# Patient Record
Sex: MALE | Race: WHITE | NOT HISPANIC OR LATINO | Employment: FULL TIME | URBAN - NONMETROPOLITAN AREA
[De-identification: names, ages, dates, MRNs, and addresses within clinical notes are randomized per-mention and may not be internally consistent; named-entity substitution may affect disease eponyms.]

---

## 2017-02-06 ENCOUNTER — AMBULATORY - GICH (OUTPATIENT)
Dept: ORTHOPEDICS | Facility: OTHER | Age: 52
End: 2017-02-06

## 2017-02-06 DIAGNOSIS — Z98.890 OTHER SPECIFIED POSTPROCEDURAL STATES: ICD-10-CM

## 2017-02-07 ENCOUNTER — HISTORY (OUTPATIENT)
Dept: ORTHOPEDICS | Facility: OTHER | Age: 52
End: 2017-02-07

## 2017-02-07 ENCOUNTER — OFFICE VISIT - GICH (OUTPATIENT)
Dept: ORTHOPEDICS | Facility: OTHER | Age: 52
End: 2017-02-07

## 2017-02-07 ENCOUNTER — HOSPITAL ENCOUNTER (OUTPATIENT)
Dept: RADIOLOGY | Facility: OTHER | Age: 52
End: 2017-02-07
Attending: ORTHOPAEDIC SURGERY

## 2017-02-07 DIAGNOSIS — Z98.890 OTHER SPECIFIED POSTPROCEDURAL STATES: ICD-10-CM

## 2017-02-08 ENCOUNTER — COMMUNICATION - GICH (OUTPATIENT)
Dept: INTERNAL MEDICINE | Facility: OTHER | Age: 52
End: 2017-02-08

## 2017-02-08 DIAGNOSIS — I10 ESSENTIAL (PRIMARY) HYPERTENSION: ICD-10-CM

## 2017-03-26 ENCOUNTER — HISTORY (OUTPATIENT)
Dept: FAMILY MEDICINE | Facility: OTHER | Age: 52
End: 2017-03-26

## 2017-03-26 ENCOUNTER — OFFICE VISIT - GICH (OUTPATIENT)
Dept: FAMILY MEDICINE | Facility: OTHER | Age: 52
End: 2017-03-26

## 2017-03-26 DIAGNOSIS — R69 ILLNESS: ICD-10-CM

## 2017-03-26 DIAGNOSIS — R05.9 COUGH: ICD-10-CM

## 2017-03-26 DIAGNOSIS — R53.83 OTHER FATIGUE: ICD-10-CM

## 2017-03-26 DIAGNOSIS — R50.9 FEVER: ICD-10-CM

## 2017-04-05 ENCOUNTER — COMMUNICATION - GICH (OUTPATIENT)
Dept: INTERNAL MEDICINE | Facility: OTHER | Age: 52
End: 2017-04-05

## 2017-04-05 DIAGNOSIS — E11.65 TYPE 2 DIABETES MELLITUS WITH HYPERGLYCEMIA (H): ICD-10-CM

## 2017-04-06 ENCOUNTER — COMMUNICATION - GICH (OUTPATIENT)
Dept: INTERNAL MEDICINE | Facility: OTHER | Age: 52
End: 2017-04-06

## 2017-04-06 DIAGNOSIS — I10 ESSENTIAL (PRIMARY) HYPERTENSION: ICD-10-CM

## 2017-04-28 ENCOUNTER — COMMUNICATION - GICH (OUTPATIENT)
Dept: INTERNAL MEDICINE | Facility: OTHER | Age: 52
End: 2017-04-28

## 2017-04-28 DIAGNOSIS — E11.9 TYPE 2 DIABETES MELLITUS WITHOUT COMPLICATIONS (H): ICD-10-CM

## 2017-05-08 ENCOUNTER — OFFICE VISIT - GICH (OUTPATIENT)
Dept: INTERNAL MEDICINE | Facility: OTHER | Age: 52
End: 2017-05-08

## 2017-05-08 ENCOUNTER — HISTORY (OUTPATIENT)
Dept: INTERNAL MEDICINE | Facility: OTHER | Age: 52
End: 2017-05-08

## 2017-05-08 DIAGNOSIS — R94.5 ABNORMAL RESULTS OF LIVER FUNCTION STUDIES: ICD-10-CM

## 2017-05-08 DIAGNOSIS — I10 ESSENTIAL (PRIMARY) HYPERTENSION: ICD-10-CM

## 2017-05-08 DIAGNOSIS — E66.01 MORBID (SEVERE) OBESITY DUE TO EXCESS CALORIES (H): ICD-10-CM

## 2017-05-08 DIAGNOSIS — G47.33 OBSTRUCTIVE SLEEP APNEA: ICD-10-CM

## 2017-05-08 DIAGNOSIS — E11.9 TYPE 2 DIABETES MELLITUS WITHOUT COMPLICATIONS (H): ICD-10-CM

## 2017-05-08 DIAGNOSIS — Z00.00 ENCOUNTER FOR GENERAL ADULT MEDICAL EXAMINATION WITHOUT ABNORMAL FINDINGS: ICD-10-CM

## 2017-05-08 DIAGNOSIS — E78.2 MIXED HYPERLIPIDEMIA: ICD-10-CM

## 2017-05-08 DIAGNOSIS — Z99.89 DEPENDENCE ON OTHER ENABLING MACHINES AND DEVICES: ICD-10-CM

## 2017-05-08 LAB
A/G RATIO - HISTORICAL: 1.6 (ref 1–2)
ALBUMIN SERPL-MCNC: 4.7 G/DL (ref 3.5–5.7)
ALP SERPL-CCNC: 45 IU/L (ref 34–104)
ALT (SGPT) - HISTORICAL: 33 IU/L (ref 7–52)
ANION GAP - HISTORICAL: 13 (ref 5–18)
AST SERPL-CCNC: 27 IU/L (ref 13–39)
BILIRUB SERPL-MCNC: 0.4 MG/DL (ref 0.3–1)
BILIRUB UR QL: NEGATIVE
BUN SERPL-MCNC: 14 MG/DL (ref 7–25)
BUN/CREAT RATIO - HISTORICAL: 14
CALCIUM SERPL-MCNC: 9.7 MG/DL (ref 8.6–10.3)
CHLORIDE SERPLBLD-SCNC: 101 MMOL/L (ref 98–107)
CHOL/HDL RATIO - HISTORICAL: 2.82
CHOLESTEROL TOTAL: 127 MG/DL
CLARITY, URINE: CLEAR CLARITY
CO2 SERPL-SCNC: 25 MMOL/L (ref 21–31)
COLOR UR: YELLOW COLOR
CREAT SERPL-MCNC: 0.97 MG/DL (ref 0.7–1.3)
ERYTHROCYTE [DISTWIDTH] IN BLOOD BY AUTOMATED COUNT: 11.6 % (ref 11.5–15.5)
ESTIMATED AVERAGE GLUCOSE: 128 MG/DL
GFR IF NOT AFRICAN AMERICAN - HISTORICAL: >60 ML/MIN/1.73M2
GLOBULIN - HISTORICAL: 2.9 G/DL (ref 2–3.7)
GLUCOSE SERPL-MCNC: 98 MG/DL (ref 70–105)
GLUCOSE URINE: NEGATIVE MG/DL
HCT VFR BLD AUTO: 44.8 % (ref 37–53)
HDLC SERPL-MCNC: 45 MG/DL (ref 23–92)
HEMOGLOBIN A1C MONITORING (POCT) - HISTORICAL: 6.1 % (ref 4–6.2)
HEMOGLOBIN: 14.9 G/DL (ref 13.5–17.5)
KETONES UR QL: NEGATIVE MG/DL
LDLC SERPL CALC-MCNC: 57 MG/DL
LEUKOCYTE ESTERASE URINE: NEGATIVE
MCH RBC QN AUTO: 31 PG (ref 26–34)
MCHC RBC AUTO-ENTMCNC: 33.2 G/DL (ref 32–36)
MCV RBC AUTO: 93 FL (ref 80–100)
NITRITE UR QL STRIP: NEGATIVE
NON-HDL CHOLESTEROL - HISTORICAL: 82 MG/DL
OCCULT BLOOD,URINE - HISTORICAL: NEGATIVE
PATIENT STATUS - HISTORICAL: NORMAL
PH UR: 5 [PH]
PLATELET # BLD AUTO: 287 THOU/CU MM (ref 140–440)
PMV BLD: 7.4 FL (ref 6.5–11)
POTASSIUM SERPL-SCNC: 4.2 MMOL/L (ref 3.5–5.1)
PROT SERPL-MCNC: 7.6 G/DL (ref 6.4–8.9)
PROTEIN QUALITATIVE,URINE - HISTORICAL: NEGATIVE MG/DL
RED BLOOD COUNT - HISTORICAL: 4.8 MIL/CU MM (ref 4.3–5.9)
SODIUM SERPL-SCNC: 139 MMOL/L (ref 133–143)
SP GR UR STRIP: 1.01
TRIGL SERPL-MCNC: 127 MG/DL
UROBILINOGEN,QUALITATIVE - HISTORICAL: NORMAL EU/DL
WHITE BLOOD COUNT - HISTORICAL: 8.3 THOU/CU MM (ref 4.5–11)

## 2017-05-08 ASSESSMENT — PATIENT HEALTH QUESTIONNAIRE - PHQ9: SUM OF ALL RESPONSES TO PHQ QUESTIONS 1-9: 0

## 2017-05-09 ENCOUNTER — COMMUNICATION - GICH (OUTPATIENT)
Dept: SURGERY | Facility: OTHER | Age: 52
End: 2017-05-09

## 2017-05-09 DIAGNOSIS — Z12.11 ENCOUNTER FOR SCREENING FOR MALIGNANT NEOPLASM OF COLON: ICD-10-CM

## 2017-05-10 LAB
ALB RAND URINE - HISTORICAL: <5 MG/L
CREATININE, URINE - HISTORICAL: 0.14 G/L
MICROALBUMIN, RAND UR - HISTORICAL: NORMAL MG/G CREAT
PROTEIN QUALITATIVE,URINE - HISTORICAL: NEGATIVE MG/DL

## 2017-05-18 ENCOUNTER — SURGERY (OUTPATIENT)
Dept: SURGERY | Facility: OTHER | Age: 52
End: 2017-05-18

## 2017-05-18 ENCOUNTER — TRANSFERRED RECORDS (OUTPATIENT)
Dept: MULTI SPECIALTY CLINIC | Facility: CLINIC | Age: 52
End: 2017-05-18

## 2017-05-18 ENCOUNTER — HOSPITAL ENCOUNTER (OUTPATIENT)
Dept: SURGERY | Facility: OTHER | Age: 52
Discharge: HOME | End: 2017-05-18
Attending: SURGERY | Admitting: SURGERY

## 2017-05-18 ENCOUNTER — HISTORY (OUTPATIENT)
Dept: SURGERY | Facility: OTHER | Age: 52
End: 2017-05-18

## 2017-05-19 ENCOUNTER — HISTORY (OUTPATIENT)
Dept: SURGERY | Facility: OTHER | Age: 52
End: 2017-05-19

## 2017-05-19 ENCOUNTER — COMMUNICATION - GICH (OUTPATIENT)
Dept: SURGERY | Facility: OTHER | Age: 52
End: 2017-05-19

## 2017-08-08 ENCOUNTER — COMMUNICATION - GICH (OUTPATIENT)
Dept: INTERNAL MEDICINE | Facility: OTHER | Age: 52
End: 2017-08-08

## 2017-08-08 DIAGNOSIS — I10 ESSENTIAL (PRIMARY) HYPERTENSION: ICD-10-CM

## 2017-12-28 NOTE — TELEPHONE ENCOUNTER
Patient Information     Patient Name MRN Willis Rubin [id number] Male 1965      Telephone Encounter by Claudette Ruiz at 2017 11:49 AM     Author:  Claudette Ruiz Service:  (none) Author Type:  (none)     Filed:  2017 11:49 AM Encounter Date:  2017 Status:  Signed     :  Claudette Ruiz            Left message to call back  ...................Claudette Ruiz LPN  2017   11:49 AM

## 2017-12-28 NOTE — TELEPHONE ENCOUNTER
Patient Information     Patient Name MRN Willis Rubin [id number] Male 1965      Telephone Encounter by Claudette Ruiz at 2017 11:42 AM     Author:  Claudette Ruiz Service:  (none) Author Type:  (none)     Filed:  2017 11:49 AM Encounter Date:  2017 Status:  Signed     :  Claudette Ruiz            Due to Atenolol shortage, patient needs prescription sent to [facility] retail pharmacy.  Order pending at this time.      Claudette Ruiz LPN        2017 11:42 AM

## 2018-01-03 NOTE — PROGRESS NOTES
"Patient Information     Patient Name MRN Sex Willis Richardson [id number] Male 1965      Progress Notes by Jimmy Rucker DO at 2017  8:15 AM     Author:  Jimmy Rucker DO Service:  (none) Author Type:  Physician     Filed:  2017  8:49 AM Encounter Date:  2017 Status:  Signed     :  Jimmy Rucker DO (Physician)            PROGRESS NOTE    SUBJECTIVE:  Willis Oliveira is here for evaluation in regards to his left shoulder. He is now around 6 months out from shoulder surgery. He is able to do all activities that he would like to perform. He is over sleep on her shoulder and has very minimal discomfort. He states what he feels now is more related to building muscle. Otherwise no other complaints or problems.    OBJECTIVE:  Visit Vitals       /80     Pulse 68     Ht 1.727 m (5' 8\")     Wt 103.4 kg (228 lb)     BMI 34.67 kg/m2    Body mass index is 34.67 kg/(m^2).    General Appearance: Pleasant male in good appearance, mood and affect.  Alert and orientated times three ( time, date and location).    Skin: Normal    Shoulder:  Effusion: no  Motion: Active motion is 0-180  without scapular substitution and this is noted with forward flexion. His abduction has now improved, only a lag of about 5  but once he warms it up full motion appreciated. Good internal and external rotation equal bilaterally.  Strength: Slight weakness left versus right but this has improved since last visit.    Elbow:  Flexion: Normal  Extension: Normal  Deep tendon reflexes: Normal    Hand:  Sensation: Normal  Radial and ulnar blood flow:  Normal    Eyes: Pupils are round and he wears glasses.    Ears: Hearing: Intact    Heart: normal    Lungs: clear.     Radiographic images from  where independently reviewed and discussed with the patient.      Xray:     X-rays show appropriate distal clavicle excision through small bony fragments in the capsule which is explained to the patient " are okay there is a subacromial decompression no other abnormalities.    ASSESSMENT     POSTOPERATIVE DIAGNOSES    1. Partial tear of the superior portion of the left subscapularis.    2. Impingement syndrome.    3. AC arthritis.    4. Labral tearing/fraying.    5. Bicipital fraying.    6. Excellent cartilage, humeral head and glenoid.     PROCEDURES  1. Left shoulder arthroscopy with extensive debridement to include 2% of the anterior to posterior labrum, less than 1% of the biceps, and approximately 3% of the subscap. (DOS: 08/11/2016).     2. Arthroscopic subscapularis repair utilizing 2 FiberStick sutures and one 4.75 mm BioComposite SwiveLock anchor.    3. Arthroscopic subacromial decompression.    4. Arthroscopic distal clavicle excision.    PLAN:    He was instructed on wall walking, and utilizing the wall to do more stretches, this is important to continue.  He needs to continue with his home therapy especially working on his motions.  He will follow-up as needed.  All questions and concerns were answered to his satisfaction.  I once again stressed to him that it takes a year before were back to full activities and he has not had a schedule.    Jimmy Rucker D.O.  Orthopaedic Surgeon    [facility]  [address] [place] [address]  Phone [phone] (KNEE)  Fax [phone]    This document was created using computer generated templates and voice activated software.    8:46 AM 2/7/2017

## 2018-01-03 NOTE — TELEPHONE ENCOUNTER
Patient Information     Patient Name MRN Sex Willis Richardson [id number] Male 1965      Telephone Encounter by Sandra Mirza RN at 2017  8:38 AM     Author:  Sandra Mirza RN Service:  (none) Author Type:  NURS- Registered Nurse     Filed:  2017  8:42 AM Encounter Date:  2017 Status:  Signed     :  Sandra Mirza RN (NURS- Registered Nurse)            Beta Blockers     Office visit in the past 12 months or per provider note.    Last visit with LYNETTE BERNAL was on: 04/15/2016 in [facility]  Next visit with LYNETTE BERNAL is on: No future appointment listed with this provider  Next visit with Internal Medicine is on: No future appointment listed in this department    Max refill for 12 months from last office visit or per provider note.    Diuretics (may be prescribed for edema)    Office visit in the past 12 months or per provider note.    Lab testing requirements:  Creatinine and Potassium annually, if ordering lab, order BMP.  CREATININE (mg/dL)    Date Value   2016 1.00     POTASSIUM (mmol/L)    Date Value   2016 4.5       Review last provider visit note.  If BP reviewed and plan is noted, can refill.  Max refill for 12 months from last office visit or per provider note.  Prescription refilled per RN Medication Refill Policy.................... SANDRA MIRZA RN ....................  2017   8:41 AM

## 2018-01-03 NOTE — NURSING NOTE
Patient Information     Patient Name MRN Sex Willis Richardson [id number] Male 1965      Nursing Note by Gosselin, Norma J at 2017  8:15 AM     Author:  Gosselin, Norma J Service:  (none) Author Type:  (none)     Filed:  2017  8:17 AM Encounter Date:  2017 Status:  Signed     :  Gosselin, Norma J            Follow up left shoulder.  DOS: 16  Norma J Gosselin LPN....................  2017   8:11 AM

## 2018-01-04 NOTE — TELEPHONE ENCOUNTER
Patient Information     Patient Name MRN Sex Willis Richardson [id number] Male 1965      Telephone Encounter by Sandra Mirza RN at 2017  8:42 AM     Author:  Sandra Mirza RN Service:  (none) Author Type:  NURS- Registered Nurse     Filed:  2017  8:47 AM Encounter Date:  2017 Status:  Signed     :  Sandra Mirza RN (NURS- Registered Nurse)            Ace Inhibitors    Office visit in the past 12 months or per provider note.    Last visit with LYNETTE BERNAL was on: 04/15/2016 in [facility]  Next visit with LYNETTE BERNAL is on: No future appointment listed with this provider  Next visit with Internal Medicine is on: No future appointment listed in this department    Lab test requirements:  Creatinine and Potassium annually, if ordering lab, order BMP.  CREATININE (mg/dL)    Date Value   2016 1.00     POTASSIUM (mmol/L)    Date Value   2016 4.5       Max refill for 12 months from last office visit or per provider note  Letter sent yesterday patient is due for an appointment.    Unable to complete prescription refill per RN Medication Refill Policy.................... SANDRA MIRZA RN ....................  2017   8:47 AM

## 2018-01-04 NOTE — PROGRESS NOTES
"Patient Information     Patient Name MRN Sex Willis Richardson [id number] Male 1965      Progress Notes by Lilly Cabral at 3/26/2017 11:30 AM     Author:  Lilly Cabral Service:  (none) Author Type:  PHYS- Nurse Practitioner     Filed:  3/31/2017  7:28 PM Encounter Date:  3/26/2017 Status:  Signed     :  Lilly Cabral (PHYS- Nurse Practitioner)            HPI:    Willis Oliveira is a 51 y.o. male who presents to clinic today for cough and fever since Friday. Temp at highest yesterday was 102. Bad headache temporal area predominantly. Coughing so hard \"I feel like I pulled every muscle in my chest. I cant get the phlegm up\".. Tried Mucinex without improvement. Decreased appetite but taking fluids well. Reports BS has been stable.  Denies GI or  symptoms.     Past Medical History      Diagnosis   Date     AC (acromioclavicular) joint arthritis  2016     History of hypokalemia       History of hypokalemia secondary to lisinopril/hydrochlorothiazide combination medication, potassium 2.9, normalized off medication       Hyperlipidemia        Hyperlipidemia, goal LDL is less than 160      Impaired fasting glucose        Impaired fasting glucose,       Impingement syndrome of left shoulder  2016     Lower back injury         Low back injury at work, muscle strain, Dr. Howard      Partial tear of left subscapularis tendon  2016     S/P arthroscopic left shoulder surgery  2016     Tendinitis of left rotator cuff  2016     Past Surgical History       Procedure   Laterality Date     Knee arthroscopy        Arthroscopic surgery of the left knee x 2, meniscus and lateral release       Knee arthroscopy        Lateral release, right knee       Sleep study per protocol        Sleep study, [facility]        S/p arthroscopic left shoulder surgery  Left 2016     Social History        Substance Use Topics          Smoking status:   Former " Smoker      Packs/day:  0.50      Years:  10.00      Types:  Cigarettes      Quit date:  1/1/1994      Smokeless tobacco:   Current User      Types:  Chew       Comment: tin lasts a week, 30 years       Alcohol use   0.0 oz/week     0 Standard drinks or equivalent per week        Comment: 6 to 12 beers weekly       Current Outpatient Prescriptions       Medication  Sig Dispense Refill     ACCU-CHEK JAMAL PLUS METER   0     atenolol (TENORMIN) 50 mg tablet TAKE ONE TABLET BY MOUTH ONE TIME DAILY  90 tablet 0     blood sugar diagnostic strip Dispense item covered by pt ins. 250.02 NIDDM type II, uncontrolled - Test 4 times/day. Reason: High A1C 350 Each 1     blood-glucose meter Dispense item covered by pt ins. 250.02 NIDDM type II, uncontrolled - Test 4 times/day. Reason: High A1C 1 Device 0     CPAP CPAP Supplies -- heated humidifier, mask, headgear, filters and tubing. For home use.  Length of Need: 99 mo 1 unit 0     glipiZIDE-metFORMIN (METAGLIP) 2.5-500 mg per tablet Take 0.5-1 tablets by mouth 2 times daily with meals. - watch for low blood sugars 180 tablet 3     lancets Dispense item covered by pt ins. 250.02 NIDDM type II, uncontrolled - Test 4 times/day. Reason: High A1C 350 Each 1     lisinopril (PRINIVIL; ZESTRIL) 20 mg tablet TAKE 1 TABLET BY MOUTH ONCE DAILY. 90 tablet 1     metFORMIN (GLUCOPHAGE) 850 mg tablet Take 1 tablet by mouth 2 times daily with meals. -- with glipizide/metformin combo tablet 180 tablet 3     multivitamin (MVI) tablet Take 1 tablet by mouth once daily.  0     simvastatin (ZOCOR) 10 mg tablet TAKE 1 TABLET BY MOUTH AT BEDTIME. 90 tablet 2     spironolactone (ALDACTONE) 25 mg tablet TAKE ONE TABLET BY MOUTH ONE TIME DAILY  90 tablet 0     No current facility-administered medications for this visit.      Medications have been reviewed by me and are current to the best of my knowledge and ability.    Allergies      Allergen   Reactions     Penicillins  Rash and Other - Describe In  Comment Field     Itchy eyes      Victoza [Liraglutide]  Other - Describe In Comment Field     abd pain        ROS:  Refer to HPI, otherwise negative.    EXAM:  General appearance: well appearing male in no acute distress  Head: normocephalic, atraumatic, no facial tenderness   Ears: TM's with cone of light, no erythema, canals clear bilaterally  Eyes: conjunctivae normal  Orophayrnx: moist mucous membranes, tonsils with mild erythema, exudates or petechiae, no post nasal drip seen  Neck: supple without adenopathy  Respiratory: clear to auscultation bilaterally, few faint scattered wheezes  Cardiac: RRR with no murmurs  Dermatological: no rashes or lesions  Psychological: normal affect, alert and pleasant    ASSESSMENT/PLAN:    ICD-10-CM    1. Influenza-like illness R69 oseltamivir (TAMIFLU) 75 mg capsule   2. Fever and chills R50.9 oseltamivir (TAMIFLU) 75 mg capsule   3. Cough R05 oseltamivir (TAMIFLU) 75 mg capsule      albuterol HFA 90 mcg/actuation inhaler   4. Fatigue, unspecified type R53.83 oseltamivir (TAMIFLU) 75 mg capsule       Plan: Fever, fatigue, cough, Influenza like illness. Rx with Tamiflu,   Rx with albuterol 2 puffs regularly initially. Discussed additional self care measures.   Instructed to follow up in 7 days if not improved, but return sooner if worsens.   See patient instructions. Pt in agreements with plan.  Discussed expected course, Signs and symptoms to return to PCP.   No further questions.       Patient Instructions   I am quite sure you have influenza. I ordered Tamiflu 75 mg twice a day for 5 days. This does not cure the flu but shortens the duration and decreases the symptoms some.     I also ordered albuterol inhaler take 2 puffs 4 times a day regularly for a few days until you are feeling better. This should help with your cough.      -You may also take Advil (ibuprofen) 3 tabs up to 4 times per day or Aleve (naproxen) 2 tabs twice a day as needed for fever or pain. You may want  to take this regularly for a few days if you are experiencing body aches or inflamation.   -ALWAYs take ibuprofen, naproxen with food and plenty of fluids. Stop if you develop heartburn or stomach pain.     -Please take tylenol (acetominophen) 2 tabs as needed up to 4 times daily.      -You can use throat lozenges, frequent swallows of cool/cold liquids to sooth your throat.    -Drink lots of fluids and get plenty of rest.   -Treat symptomatically with warm salt water gargles.    -Oatmeal coats the throat and some patients find it soothes the pain.     -Monitor for any fevers or chills.  Return in 7-10 days if not feeling better.   -Please call clinic with any questions or concerns.   -Return to clinic with change/worsening of symptoms.

## 2018-01-04 NOTE — PATIENT INSTRUCTIONS
"Patient Information     Patient Name MRN Willis Rubin [id number] Male 1965      Patient Instructions by Boo Celeste MD at 2017  3:20 PM     Author:  Boo Celeste MD  Service:  (none) Author Type:  Physician     Filed:  2017  4:18 PM  Encounter Date:  2017 Status:  Addendum     :  Boo Celeste MD (Physician)        Related Notes: Original Note by Boo Celeste MD (Physician) filed at 2017  4:13 PM            Blood pressures look very good today.    Medications refilled.    Labs checked today.    Return for Diabetes labs and clinic follow-up appointment every 3 to 4 months.  --- (Go for about 91 to 100 days)    Schedule lab only appointment --- A few days AFTER: 17     Schedule clinic appointment with Dr. Celeste -- Same day as labs, or 1-2 days later.     Insurance companies are now grading you and I on your blood sugar control -- Goal is to get your A1c down to 7.9% or lower and NO Smoking!    -- Medicare and most insurance companies, will only cover Hemoglobin A1c labs to be rechecked every 91+ days.      HEMOGLOBIN A1C MONITORING (POCT) (%)    Date Value   2016 6.6 (H)     HEMOGLOBIN A1C GIH (%)    Date Value   2012 6.3 (H)        Next follow-up appointment with Dr. Celeste should be scheduled:  -- Approximately a few days AFTER: 17      -- Weight is up about 7 pounds since 2016.     Your Body mass index (BMI) is:  Estimated body mass index is 35.75 kg/(m^2) as calculated from the following:    Height as of this encounter: 1.727 m (5' 8\").    Weight as of this encounter: 106.7 kg (235 lb 2 oz).   (BMI ranges: Normal 18.5 - 25, Overweight 25 - 30, Obesity greater than 30)     Facts about losing weight:   -- Overweight and Obesity increase your risk for developing diabetes, high blood pressure and stroke, and shorten your life.   -- 90% of weight loss comes from dietary changes, only 10% from exercise   -- For every 1 pound " of of weight loss -- this is equal to about 8 to 10 pounds of weight off of your knees.   -- there are about 3500 calories in 1 pound of body weight.     -- Goal Caloric intake -- 1200 to 1500 daily.     Get out and walk for 10 to 15 minutes after each meal -- this significantly lowers the spike in blood sugar after eating.     What have successful people done to lose large amounts of weight, and keep it off?   -- Used both diet and exercise to lose weight   -- Ate a healthy breakfast every day   -- Exercised an hour per day   -- Watched less than 10 hours of TV per week   -- Weighed themselves weekly   -- Drink a large glass of water 10-15 minutes before your meals.   -- Use smaller dinner plates and don't go back for seconds.     What should I do?   -- Work on 5-10% weight loss   -- Focus on a few healthy dietary changes   -- Eat more fresh fruits and vegetables, and fewer carbohydrates (http://myplate.gov/)   -- Exercise every day   -- Weigh yourself once a week    Weight Management   Weight Watchers     Meets Mondays 9:30, 11:45, or 5:30    James Magana, [address], [place], [place]     Contact Wendy [phone] [email or web address]  Weight [email or web address] [email or web address]    -- Consider My Fitness Pal on your smart phone  [email or web address]      I would recommend a focus on weight loss and increased exercise with a goal of 30 minutes of exercise at least 5 times per week in order to help prevent worsening of your blood sugar control.     Keep working to try obtain/maintain healthy weight, weight loss, healthy diet and regular exercise.

## 2018-01-04 NOTE — NURSING NOTE
Patient Information     Patient Name MRN Willis Rubin [id number] Male 1965      Nursing Note by Cheyenne Farmer at 3/26/2017 11:30 AM     Author:  Cheyenne Farmer Service:  (none) Author Type:  (none)     Filed:  3/26/2017 12:30 PM Encounter Date:  3/26/2017 Status:  Signed     :  Cheyenne Farmer            Patient presents to the clinic today for cough and fever.    Cheyenne Farmer ....................  3/26/2017   12:09 PM

## 2018-01-04 NOTE — TELEPHONE ENCOUNTER
Patient Information     Patient Name MRN Sex Willis Richardson [id number] Male 1965      Telephone Encounter by Joann Masters at 2017  8:29 AM     Author:  Joann Masters Service:  (none) Author Type:  (none)     Filed:  2017  8:30 AM Encounter Date:  2017 Status:  Signed     :  Joann Masters            DJS- PATIENT NEEDS A REFILL ON HIS MEDS BEFORE HIS PX APPT

## 2018-01-04 NOTE — PROGRESS NOTES
Patient Information     Patient Name MRN Sex Willis Richardson [id number] Male 1965      Progress Notes by Boo Celeste MD at 2017  3:20 PM     Author:  Boo Celeste MD Service:  (none) Author Type:  Physician     Filed:  2017  6:21 PM Encounter Date:  2017 Status:  Signed     :  Boo Celeste MD (Physician)            Nursing Notes:   JosephClaudette  2017  3:44 PM  Signed  Patient presents to the clinic for medication management.  Last eye exam was over a year ago, next visit will occur prior to winter per patient.    Claudette Ruiz LPN        2017 3:24 PM    Willis Oliveira presents to clinic today for:   Chief Complaint    Patient presents with      Medication Management     HPI: Mr. Olievira is a 51 y.o. male who presents today for evaluation of above.     (Z00.00) Annual physical exam  (primary encounter diagnosis)  (I10) HYPERTENSION  (E78.2) Mixed hyperlipidemia  (G47.33,  Z99.89) REKAH on CPAP  (E11.9) Type 2 diabetes mellitus without complication, without long-term current use of insulin (HC)  (R94.5) Elevated LFTs  (E66.9) Obesity, Class II, BMI 35-39.9, with comorbidity     Patient presents for annual physical examination.    Hypertension, currently well controlled.  Tolerating medications.  Needs refills.  Check labs today.    Hyperlipidemia, doing well with simvastatin.  Check labs.  Needs refills.  Last Lipids:  Chol: 2017 127   127  HDL: 2017 45   LDL: 2017 57    Sleep apnea, uses CPAP nightly.  Finds very helpful.    Diabetes, well controlled.  Has not had labs for quite a while.  Due for medication refills, labs today.  Glucose testing about 2x weekly.   HEMOGLOBIN A1C MONITORING (POCT) (%)    Date Value   2017 6.1     HEMOGLOBIN A1C GIH (%)    Date Value   2012 6.3 (H)     History of elevated liver function tests, recheck labs today.    + influenza this winter.     Obesity, discussed need for reduce or caloric  intake.  Regular exercise has gained some weight since his last appointment.    Preventative Health:  Wears seat belts.   Health screenings are Due -- colonoscopy ordered.  Immunizations are up to date.   Advised to try obtain/maintain healthy weight, weight loss, healthy diet and regular exercise.     Mr. Meltons Body mass index is 35.75 kg/(m^2). This is out of the normal range for a 51 y.o. Normal range for ages 18+ is between 18.5 and 24.9. To lose weight we reviewed risks and benefits of appropriate options such as diet, exercise, and medications. Patient's strategy will be  self-directed nutrition plan and self-directed exercise program   BP Readings from Last 1 Encounters:05/08/17 : 130/82  Mr. Zarate blood pressure is out of the normal range for adults. Per JNC-8 guidelines normal adult blood pressure is < 120/80, pre-hypertensive is between 120/80 and 139/89, and hypertension is 140/90 or greater. Risks of hypertension were discussed. Patient's strategy will be weight loss, increased activity and reduced salt intake    Functional Capacity: > 4 METS.   Reports that he can climb a flight of stairs without any chest pain/heaviness or shortness of breath.   Patient reports no current symptoms of fevers, chills, nausea/vomiting.   No cough. No shortness of breath.   No change in bowel/bladder habits. No melena, hematochezia. No Hematuria.   No rashes. No palpitations.  No orthopnea/paroxysmal nocturnal dyspnea   No vision or hearing issues.   No significant mood issues   No bruising.     DEA:  No flowsheet data found.    PHQ9:  PHQ Depression Screening 12/2/2016 5/8/2017   Date of PHQ exam (doc flow) 12/2/2016 5/8/2017   1. Lack of interest/pleasure - 0 - Not at all   2. Feeling down/depressed - 0 - Not at all   PHQ-2 TOTAL SCORE - 0   3. Trouble sleeping - 0 - Not at all   4. Decreased energy - 0 - Not at all   5. Appetite change - 0 - Not at all   6. Feelings of failure - 0 - Not at all   7. Trouble  concentrating - 0 - Not at all   8. Activity level - 0 - Not at all   9. Hurting yourself - 0 - Not at all   PHQ-9 TOTAL SCORE - 0   PHQ-9 Severity Level - none   Functional Impairment - not difficult at all        I have personally reviewed the past medical history, past surgical history, medications, allergies, family and social history as listed below, on 5/8/2017.    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          HYPERTENSION       Hypertension, systolic and diastolic          Type 2 diabetes mellitus without complication, without long-term current use of insulin (HC)       Past Medical History:     Diagnosis  Date     AC (acromioclavicular) joint arthritis 8/5/2016     History of hypokalemia 2004    History of hypokalemia secondary to lisinopril/hydrochlorothiazide combination medication, potassium 2.9, normalized off medication       Hyperlipidemia      Hyperlipidemia, goal LDL is less than 160      Impingement syndrome of left shoulder 8/5/2016     Lower back injury 2004      Low back injury at work, muscle strain, Dr. [place]      Partial tear of left subscapularis tendon 8/4/2016     S/P arthroscopic left shoulder surgery 8/11/2016     Tendinitis of left rotator cuff 8/5/2016     Past Surgical History:      Procedure  Laterality Date     KNEE ARTHROSCOPY  1980    Arthroscopic surgery of the left knee x 2, meniscus and lateral release        KNEE ARTHROSCOPY  1981    Lateral release, right knee       S/P ARTHROSCOPIC LEFT SHOULDER SURGERY Left 8/11/2016     SLEEP STUDY PER PROTOCOL  2001    Sleep study, [facility]        Current Outpatient Prescriptions       Medication  Sig Dispense Refill     ACCU-CHEK JAMAL PLUS METER   0     albuterol HFA 90 mcg/actuation inhaler Inhale 2 Puffs by mouth 4 times daily if needed. 1 Inhaler 0     aspirin (ECOTRIN) 81 mg enteric coated tablet Take 1 tablet by mouth once daily with a meal.  0     atenolol (TENORMIN) 50 mg tablet Take 1 tablet by mouth once daily. 90 tablet 3     blood sugar diagnostic strip Dispense item covered by pt ins. 250.02 NIDDM type II, uncontrolled - Test 4 times/day. Reason: High A1C 350 Each 1     blood-glucose meter Dispense item covered by pt ins. 250.02 NIDDM type II, uncontrolled - Test 4 times/day. Reason: High A1C 1 Device 0     CPAP CPAP Supplies -- heated humidifier, mask, headgear, filters and tubing. For home use.  Length of Need: 99 mo 1 unit 0     glipiZIDE-metFORMIN (METAGLIP) 2.5-500 mg per tablet TAKE 0.5-1 TABLETS BY MOUTH 2 TIMES DAILY WITH MEALS. - WATCH FOR LOW BLOOD SUGARS 180 tablet 3     lancets Dispense item covered by pt ins. 250.02 NIDDM type II, uncontrolled - Test 4 times/day. Reason: High A1C 350 Each 1     lisinopril (PRINIVIL; ZESTRIL) 20 mg tablet Take 1 tablet by mouth once daily. 90 tablet 3     metFORMIN (GLUCOPHAGE) 850 mg tablet Take 1 tablet by mouth 2 times daily with meals. -- with glipizide/metformin combo tablet 180 tablet 3     multivitamin (MVI) tablet Take 1 tablet by mouth once daily.  0     simvastatin (ZOCOR) 10 mg tablet Take 1 tablet by mouth at bedtime. 90 tablet 3     spironolactone (ALDACTONE) 25 mg tablet Take 1 tablet by mouth once daily. 90 tablet 3     Allergies      Allergen   Reactions     Penicillins  Rash and Other - Describe In Comment Field     Itchy eyes      Victoza [Liraglutide]  Other - Describe In Comment Field     abd  pain      Family History       Problem   Relation Age of Onset     Hypertension  Father      Diabetes  Father      Heart Disease  Father      pacemaker       Cancer  Mother      metastatic cancer, type is unknown       Other  Mother      smoker       Heart Disease  Mother 58     stent       Other  Brother       hemochromatosis       Hypertension  Brother      Diabetes  Brother      Hyperlipidemia  Brother      hyperlipidemia       Hypertension  Brother      Good Health  Brother      Genetic  Other      No Known FHx of colon Cancer        Heart Disease  Brother 48     ?MI       Stroke  No Family History      Anesthesia Problem  No Family History      Blood Disease  No Family History      no bleeding or clotting       Family Status     Relation  Status     Father  at age 82    kidney failure, Diabetes      Mother  at age 69    metastatic cancer, type is unknown, smoker.      Brother Alive     Brother Alive     Brother Alive     Daughter Alive     Brother Alive    Diabetes      Brother      Brother      Brother      Other      Brother      No Family History      Social History     Social History        Marital status:       Spouse name: Mary     Number of children:  1     Years of education:  N/A     Occupational History        [employer]     Social History Main Topics          Smoking status:   Former Smoker      Packs/day:  0.50      Years:  10.00      Types:  Cigarettes      Quit date:  1994      Smokeless tobacco:   Current User      Types:  Chew       Comment: tin lasts a week, 30 years       Alcohol use   0.0 oz/week     0 Standard drinks or equivalent per week        Comment: 6 to 12 beers weekly       Drug use:   No      Sexual activity:   Not on file      Other Topics   Concern     Exercise  Yes     Seat Belt  Yes     100 %      Social History Narrative      - Wife Mary, one daughter, lives in [place], [place], 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     Complete ROS was performed and was negative unless otherwise noted in HPI above.     EXAM:   Vitals:     05/08/17 1527   BP: 130/82   Pulse: 60   Temp: 97  F (36.1  C)   TempSrc: Tympanic   Weight: 106.7 kg (235 lb 2 oz)   Height: 1.727 m (5' 8\")     BP Readings from Last 3 Encounters:    05/08/17 130/82   03/26/17 112/80   02/07/17 122/80     Wt Readings from Last 3 Encounters:    05/08/17 106.7 kg (235 lb 2 oz)   03/26/17 103.4 kg (228 lb)   02/07/17 103.4 kg (228 lb)     Estimated body mass index is 35.75 kg/(m^2) as calculated from the following:    Height as of this encounter: 1.727 m (5' 8\").    Weight as of this encounter: 106.7 kg (235 lb 2 oz).     EXAM:  Constitutional: Pleasant, alert, appropriate appearance for age. No acute distress  ENT: Normocephalic, Atraumatic, Thyroid without nodules or tenderness   Nose/Mouth: Oral pharynx without erythema or exudates, Nose is patent bilaterally, no rhinorrhea and Dental hygeine adequate   Eyes:  Extraocular muscles intact, Sclera non-icteric, Conjunctiva without erythema  Lymphatic Exam: Non-palpable nodes in neck, clavicular, axillary, or inguinal regions.  Pulmonary: Lungs are clear to auscultation bilaterally, without wheezes or crackles  Cardiovascular Exam: regular rate and rhythm, brisk carotid upstroke without bruits, peripheral pulses very brisk, no pedal edema, no murmur, click, rub or gallop appreciated  Gastrointestinal Exam: Obese  Soft, non-tender, non-distended, positive bowel sounds  Genitourinary Exam:  Rectal: Deferred to Urology   Integument: No abnormal rashes, sores, or ulcerations noted  Neurologic Exam: CN -12 grossly intact Nonfocal; symmetric DTRs, normal gross motor movement, tone, and coordination. No tremor.  Sensation intact to light touch in upper and lower extremities  Musculoskeletal Exam: Moves upper and lower extremities symmetrically, No focal weakness  Gait and station appear grossly " normal  Psychiatric Exam: Awake and Alert, Affect and mood appropriate  Speech is fluent, Thought process is normal    INVESTIGATIONS:  Results for orders placed or performed in visit on 05/08/17      CBC W PLT NO DIFF      Result  Value Ref Range    WHITE BLOOD COUNT         8.3 4.5 - 11.0 thou/cu mm    RED BLOOD COUNT           4.80 4.30 - 5.90 mil/cu mm    HEMOGLOBIN                14.9 13.5 - 17.5 g/dL    HEMATOCRIT                44.8 37.0 - 53.0 %    MCV                       93 80 - 100 fL    MCH                       31.0 26.0 - 34.0 pg    MCHC                      33.2 32.0 - 36.0 g/dL    RDW                       11.6 11.5 - 15.5 %    PLATELET COUNT            287 140 - 440 thou/cu mm    MPV                       7.4 6.5 - 11.0 fL   COMP METABOLIC PANEL      Result  Value Ref Range    SODIUM 139 133 - 143 mmol/L    POTASSIUM 4.2 3.5 - 5.1 mmol/L    CHLORIDE 101 98 - 107 mmol/L    CO2,TOTAL 25 21 - 31 mmol/L    ANION GAP 13 5 - 18                    GLUCOSE 98 70 - 105 mg/dL    CALCIUM 9.7 8.6 - 10.3 mg/dL    BUN 14 7 - 25 mg/dL    CREATININE 0.97 0.70 - 1.30 mg/dL    BUN/CREAT RATIO           14                    GFR if African American >60 >60 ml/min/1.73m2    GFR if not African American >60 >60 ml/min/1.73m2    ALBUMIN 4.7 3.5 - 5.7 g/dL    PROTEIN,TOTAL 7.6 6.4 - 8.9 g/dL    GLOBULIN                  2.9 2.0 - 3.7 g/dL    A/G RATIO 1.6 1.0 - 2.0                    BILIRUBIN,TOTAL 0.4 0.3 - 1.0 mg/dL    ALK PHOSPHATASE 45 34 - 104 IU/L    ALT (SGPT) 33 7 - 52 IU/L    AST (SGOT) 27 13 - 39 IU/L   HEMOGLOBIN A1C MONITORING (POCT)      Result  Value Ref Range    HEMOGLOBIN A1C MONITORING (POCT) 6.1 4.0 - 6.2 %    ESTIMATED AVERAGE GLUCOSE  128 mg/dL   LIPID PANEL      Result  Value Ref Range    CHOLESTEROL,TOTAL 127 <200 mg/dL    TRIGLYCERIDES 127 <150 mg/dL    HDL CHOLESTEROL 45 23 - 92 mg/dL    NON-HDL CHOLESTEROL 82 <145 mg/dl    CHOL/HDL RATIO            2.82 <4.50                    LDL CHOLESTEROL 57  <100 mg/dL    PATIENT STATUS            NOT GIVEN                   URINALYSIS W REFLEX MICROSCOPIC IF POSITIVE      Result  Value Ref Range    COLOR                     Yellow Yellow Color    CLARITY                   Clear Clear Clarity    SPECIFIC GRAVITY,URINE    1.010 1.010, 1.015, 1.020, 1.025                    PH,URINE                  5.0 (A) 6.0, 7.0, 8.0, 5.5, 6.5, 7.5, 8.5                    UROBILINOGEN,QUALITATIVE  Normal Normal EU/dl    PROTEIN, URINE Negative Negative mg/dL    GLUCOSE, URINE Negative Negative mg/dL    KETONES,URINE             Negative Negative mg/dL    BILIRUBIN,URINE           Negative Negative                    OCCULT BLOOD,URINE        Negative Negative                    NITRITE                   Negative Negative                    LEUKOCYTE ESTERASE        Negative Negative                       ASSESSMENT AND PLAN:  Willis was seen today for medication management.    Diagnoses and all orders for this visit:    Annual physical exam  -     COLONOSCOPY SCREENING-GICH; Future    HYPERTENSION  -     CBC W PLT NO DIFF; Standing  -     COMP METABOLIC PANEL; Standing  -     CBC W PLT NO DIFF  -     COMP METABOLIC PANEL  -     atenolol (TENORMIN) 50 mg tablet; Take 1 tablet by mouth once daily.  -     lisinopril (PRINIVIL; ZESTRIL) 20 mg tablet; Take 1 tablet by mouth once daily.  -     spironolactone (ALDACTONE) 25 mg tablet; Take 1 tablet by mouth once daily.    Mixed hyperlipidemia  -     LIPID PANEL; Standing  -     LIPID PANEL  -     simvastatin (ZOCOR) 10 mg tablet; Take 1 tablet by mouth at bedtime.    REKHA on CPAP    Type 2 diabetes mellitus without complication, without long-term current use of insulin (HC)  -     HEMOGLOBIN A1C MONITORING (POCT); Standing  -     URINALYSIS W REFLEX MICROSCOPIC IF POSITIVE; Future  -     MICROALBUMIN RANDOM URINE; Future  -     HEMOGLOBIN A1C MONITORING (POCT)  -     URINALYSIS W REFLEX MICROSCOPIC IF POSITIVE  -     MICROALBUMIN RANDOM  URINE  -     glipiZIDE-metFORMIN (METAGLIP) 2.5-500 mg per tablet; TAKE 0.5-1 TABLETS BY MOUTH 2 TIMES DAILY WITH MEALS. - WATCH FOR LOW BLOOD SUGARS  -     metFORMIN (GLUCOPHAGE) 850 mg tablet; Take 1 tablet by mouth 2 times daily with meals. -- with glipizide/metformin combo tablet    Elevated LFTs  -     COMP METABOLIC PANEL; Standing  -     COMP METABOLIC PANEL    Obesity, Class II, BMI 35-39.9, with comorbidity    lab results and schedule of future lab studies reviewed with patient, reviewed diet, exercise and weight control, cardiovascular risk and specific lipid/LDL goals reviewed, specific diabetic recommendations low cholesterol diet, weight control and daily exercise discussed, home glucose monitoring emphasized, foot care discussed and Podiatry visits discussed, annual eye examinations at Ophthalmology discussed, glycohemoglobin and other lab monitoring discussed and long term diabetic complications discussed, use of aspirin to prevent MI and TIA's discussed    -- Expected clinical course discussed   -- Medications and their side effects discussed    The ASCVD Risk score (Lisa JOELLE Jr, et al., 2013) failed to calculate for the following reasons:    The valid total cholesterol range is 130 to 320 mg/dL    Willis is also recommended to eat a heart-healthy diet, do regular aerobic exercises, maintain a desirable body weight, and avoid tobacco products. These recommendations are from the American Heart Association (AHA) which stresses the importance of lifestyle changes to lower cardiovascular disease risk.     Return in about 3 months (around 8/8/2017) for -- labs in 91+ days with Diabetes clinic appointment with Dr. Ceelste 1-2 days later..    Patient Instructions     Blood pressures look very good today.    Medications refilled.    Labs checked today.    Return for Diabetes labs and clinic follow-up appointment every 3 to 4 months.  --- (Go for about 91 to 100 days)    Schedule lab only appointment --- A few  "days AFTER: 08/06/17     Schedule clinic appointment with Dr. Celeste -- Same day as labs, or 1-2 days later.     Insurance companies are now grading you and I on your blood sugar control -- Goal is to get your A1c down to 7.9% or lower and NO Smoking!    -- Medicare and most insurance companies, will only cover Hemoglobin A1c labs to be rechecked every 91+ days.      HEMOGLOBIN A1C MONITORING (POCT) (%)    Date Value   08/09/2016 6.6 (H)     HEMOGLOBIN A1C GIH (%)    Date Value   05/31/2012 6.3 (H)        Next follow-up appointment with Dr. Celeste should be scheduled:  -- Approximately a few days AFTER: 08/06/17      -- Weight is up about 7 pounds since Fall 2016.     Your Body mass index (BMI) is:  Estimated body mass index is 35.75 kg/(m^2) as calculated from the following:    Height as of this encounter: 1.727 m (5' 8\").    Weight as of this encounter: 106.7 kg (235 lb 2 oz).   (BMI ranges: Normal 18.5 - 25, Overweight 25 - 30, Obesity greater than 30)     Facts about losing weight:   -- Overweight and Obesity increase your risk for developing diabetes, high blood pressure and stroke, and shorten your life.   -- 90% of weight loss comes from dietary changes, only 10% from exercise   -- For every 1 pound of of weight loss -- this is equal to about 8 to 10 pounds of weight off of your knees.   -- there are about 3500 calories in 1 pound of body weight.     -- Goal Caloric intake -- 1200 to 1500 daily.     Get out and walk for 10 to 15 minutes after each meal -- this significantly lowers the spike in blood sugar after eating.     What have successful people done to lose large amounts of weight, and keep it off?   -- Used both diet and exercise to lose weight   -- Ate a healthy breakfast every day   -- Exercised an hour per day   -- Watched less than 10 hours of TV per week   -- Weighed themselves weekly   -- Drink a large glass of water 10-15 minutes before your meals.   -- Use smaller dinner plates and don't go " back for seconds.     What should I do?   -- Work on 5-10% weight loss   -- Focus on a few healthy dietary changes   -- Eat more fresh fruits and vegetables, and fewer carbohydrates (http://myplate.gov/)   -- Exercise every day   -- Weigh yourself once a week    Weight Management   Weight Watchers     Meets Mondays 9:30, 11:45, or 5:30    James Magana, [address], [place], [place]     Contact Wendy [phone] [email or web address]  Weight Watchers [email or web address]    -- Consider My Fitness Pal on your smart phone  [email or web address]      I would recommend a focus on weight loss and increased exercise with a goal of 30 minutes of exercise at least 5 times per week in order to help prevent worsening of your blood sugar control.     Keep working to try obtain/maintain healthy weight, weight loss, healthy diet and regular exercise.        Boo Celeste MD

## 2018-01-04 NOTE — TELEPHONE ENCOUNTER
Patient Information     Patient Name MRN Willis Rubin [id number] Male 1965      Telephone Encounter by Lakeisha Darden RN at 2017  2:29 PM     Author:  Lakeisha Darden RN Service:  (none) Author Type:  NURS- Registered Nurse     Filed:  2017  2:30 PM Encounter Date:  2017 Status:  Signed     :  Lakeisha Darden RN (NURS- Registered Nurse)            Biguanides    Office visit in the past 12 months or per provider note.    Last visit with LYNETTE BERNAL was on: 04/15/2016 in [facility]  Next visit with LYNETTE BERNAL is on: 2017 in [facility]  Next visit with Internal Medicine is on: 2017 in [facility]    Lab test requirements:  HgbA1c annually or per provider note.  HEMOGLOBIN A1C MONITORING (POCT) (%)    Date Value   2016 6.6 (H)     HEMOGLOBIN A1C GI (%)    Date Value   2012 6.3 (H)       Max refill for 12 months from last office visit or per provider note.    If taking for polycystic ovary disease, may refill for 12 months.  Prescription refilled per RN Medication Refill Policy.................... LAKEISHA DARDEN RN ....................  2017   2:29 PM

## 2018-01-04 NOTE — PATIENT INSTRUCTIONS
Patient Information     Patient Name MRN Willis Rubin [id number] Male 1965      Patient Instructions by Lilly Cabral at 3/26/2017 11:30 AM     Author:  Lilly Cabral Service:  (none) Author Type:  PHYS- Nurse Practitioner     Filed:  3/26/2017 12:44 PM Encounter Date:  3/26/2017 Status:  Signed     :  Lilly Cabral (PHYS- Nurse Practitioner)            I am quite sure you have influenza. I ordered Tamiflu 75 mg twice a day for 5 days. This does not cure the flu but shortens the duration and decreases the symptoms some.     I also ordered albuterol inhaler take 2 puffs 4 times a day regularly for a few days until you are feeling better. This should help with your cough.      -You may also take Advil (ibuprofen) 3 tabs up to 4 times per day or Aleve (naproxen) 2 tabs twice a day as needed for fever or pain. You may want to take this regularly for a few days if you are experiencing body aches or inflamation.   -ALWAYs take ibuprofen, naproxen with food and plenty of fluids. Stop if you develop heartburn or stomach pain.     -Please take tylenol (acetominophen) 2 tabs as needed up to 4 times daily.      -You can use throat lozenges, frequent swallows of cool/cold liquids to sooth your throat.    -Drink lots of fluids and get plenty of rest.   -Treat symptomatically with warm salt water gargles.    -Oatmeal coats the throat and some patients find it soothes the pain.     -Monitor for any fevers or chills.  Return in 7-10 days if not feeling better.   -Please call clinic with any questions or concerns.   -Return to clinic with change/worsening of symptoms.

## 2018-01-04 NOTE — TELEPHONE ENCOUNTER
Patient Information     Patient Name MRN Sex Willis Richardson [id number] Male 1965      Telephone Encounter by Lakeisha Darden RN at 2017  8:22 AM     Author:  Lakeisha Darden RN Service:  (none) Author Type:  NURS- Registered Nurse     Filed:  2017  8:25 AM Encounter Date:  2017 Status:  Signed     :  Lakeisha Darden RN (NURS- Registered Nurse)            Sulfonylureas    Office visit in the past 12 months or per provider note.    Last visit with LYNETTE BERNAL was on: 04/15/2016 in [facility]  Next visit with LYNETTE BERNAL is on: No future appointment listed with this provider  Next visit with Internal Medicine is on: No future appointment listed in this department    Lab test requirements:  HgbA1c annually or per provider note.  HEMOGLOBIN A1C MONITORING (POCT) (%)    Date Value   2016 6.6 (H)     HEMOGLOBIN A1C GI (%)    Date Value   2012 6.3 (H)     Patient is due for an appointment, letter sent.   Max refill for 12 months from last office visit or per provider note.  Prescription refilled per RN Medication Refill Policy.................... LAKEISHA DARDEN RN ....................  2017   8:22 AM

## 2018-01-04 NOTE — NURSING NOTE
Patient Information     Patient Name MRN Sex Willis Richardson [id number] Male 1965      Nursing Note by Claudette Ruiz at 2017  3:20 PM     Author:  Claudette Ruiz Service:  (none) Author Type:  (none)     Filed:  2017  3:44 PM Encounter Date:  2017 Status:  Signed     :  Claudette Ruiz            Patient presents to the clinic for medication management.  Last eye exam was over a year ago, next visit will occur prior to winter per patient.    Claudette Ruiz LPN        2017 3:24 PM

## 2018-01-04 NOTE — TELEPHONE ENCOUNTER
Patient Information     Patient Name MRN Sex Willis Richardson [id number] Male 1965      Telephone Encounter by Yazmin Busby at 2017  1:27 PM     Author:  Yazmin Busby Service:  (none) Author Type:  (none)     Filed:  2017  1:34 PM Encounter Date:  2017 Status:  Signed     :  Yazmin Busby            Screening Questions for the Scheduling of Screening Colonoscopies   (If Colonoscopy is diagnostic, Provider should review the chart before scheduling.)  Are you younger than 50 or older than 80?  NO   Do you take aspirin or fish oil?  ASPRIN  (if yes, tell patient to stop 1 week prior to Colonoscopy)  Do you take warfarin (Coumadin), clopidogrel (Plavix), apixaban (Eliquis), dabigatram (Pradaxa), rivaroxaban (Xarelto) or any blood thinner? NO  Do you use oxygen at home?  NO  Do you have kidney disease? NO  Are you on dialysis? NO  Have you had a stroke or heart attack in the last year? NO  Have you had a stent in your heart or any blood vessel in the last year? NO  Have you had a transplant of any organ? NO  Have you had a colonoscopy or upper endoscopy (EGD) before? YES  - COLONOSCOPY          When?   ?     Date of scheduled Colonoscopy. 2017  Provider GRISEL   Pharmacy [facility]

## 2018-01-04 NOTE — TELEPHONE ENCOUNTER
Patient Information     Patient Name MRN Sex Willis Richardson [id number] Male 1965      Telephone Encounter by Luan Soliz at 2017  1:10 PM     Author:  Luan Soliz Service:  (none) Author Type:  (none)     Filed:  2017  1:14 PM Encounter Date:  2017 Status:  Signed     :  Luan Soliz            Patient was looking for a refill. I explained as told by Wayne Pennington MDs nurse that [facility] sent their rxs to [facility] upon closure. Patient was going to call there to request a refill and if he had further problems he was going to call me back.  Luan Soliz LPN ....................  2017   1:12 PM

## 2018-01-05 NOTE — OR POSTOP
Patient Information     Patient Name MRN Sex Willis Richardson [id number] Male 1965      OR PostOp by Alejandro Quiroz RN at 2017 12:59 PM     Author:  Alejandro Quiroz RN Service:  (none) Author Type:  NURS- Registered Nurse     Filed:  2017  1:00 PM Date of Service:  2017 12:59 PM Status:  Signed     :  Alejandro Quiroz RN (NURS- Registered Nurse)            Discharge Note    Data:  Willis Oliveira has been discharged home at 1215 via ambulatory accompanied by Registered Nurse.      Action:  Written discharge/follow-up instructions were provided to patient. Prescriptions : None.  Belongings sent with patient. Medications from home sent with patient/family: Not Applicable  Equipment none .     Response:  Patient verbalized understanding of discharge instructions, reason for discharge, and necessary follow-up appointments.     Steady on feet at discharge

## 2018-01-05 NOTE — OR NURSING
Patient Information     Patient Name MRN Sex Willis Richardson [id number] Male 1965      OR Nursing by Natty Beatty RN at 2017 11:00 AM     Author:  Natty Beatty RN Service:  (none) Author Type:  NURS- Registered Nurse     Filed:  2017 11:00 AM Date of Service:  2017 11:00 AM Status:  Signed     :  Natty Beatty RN (NURS- Registered Nurse)            BS = 79

## 2018-01-05 NOTE — H&P
Patient Information     Patient Name MRN Sex Willis Richardson [id number] Male 1965      H&P by Alicia Xiao MD at 2017 10:45 AM     Author:  Alicia Xiao MD Service:  (none) Author Type:  Physician     Filed:  2017 10:46 AM Date of Service:  2017 10:45 AM Status:  Signed     :  Alicia Xiao MD (Physician)            PRE-PROCEDURE NOTE    CHIEF COMPLAINT / REASON FOR PROCEDURE:  Need for screening colonoscopy.    PERTINENT HISTORY   Patient with no complaints. Previous colonoscopy none. No diarrhea, constipation, abdominal pain or rectal bleeding. No family history of colon polyps or colon cancer.    Past Medical History:     Diagnosis  Date     AC (acromioclavicular) joint arthritis 2016     History of hypokalemia     History of hypokalemia secondary to lisinopril/hydrochlorothiazide combination medication, potassium 2.9, normalized off medication       Hyperlipidemia      Hyperlipidemia, goal LDL is less than 160      Impingement syndrome of left shoulder 2016     Lower back injury 2004      Low back injury at work, muscle strain, Dr. Howard      Partial tear of left subscapularis tendon 2016     S/P arthroscopic left shoulder surgery 2016     Tendinitis of left rotator cuff 2016     Past Surgical History:      Procedure  Laterality Date     KNEE ARTHROSCOPY      Arthroscopic surgery of the left knee x 2, meniscus and lateral release       KNEE ARTHROSCOPY      Lateral release, right knee       S/P ARTHROSCOPIC LEFT SHOULDER SURGERY Left 2016     SLEEP STUDY PER PROTOCOL      Sleep study, [facility]        Other:  None  Bleeding tendencies:  No    Relevant Family History:  None    Relevant Social History:  None    A relevant review of systems was performed and was negative.    ALLERGIES/SENSITIVITIES:   Allergies      Allergen   Reactions     Penicillins  Rash and Other - Describe In Comment Field     Itchy eyes      Victoza  [Liraglutide]  Other - Describe In Comment Field     abd pain         CURRENT MEDICATIONS:    Current Facility-Administered Medications        Medication  Dose Route Frequency Last Rate     lactated ringers infusion  25 mL/hr Intravenous continuous 25 mL/hr (05/18/17 1040)     lidocaine (1%) injection 0.1-1 mL  0.1-1 mL Intra-Dermal one time prn       sodium chloride 0.9% 5 mL syringe (NORMAL SALINE)  5 mL Intravenous Each Time PRN        Prior to Admission medications          Medication Sig Start Date End Date Taking? Last Dose Authorizing Provider   ACCU-CHEK JAMAL PLUS METER  9/9/15    Reported, Patient   albuterol HFA 90 mcg/actuation inhaler Inhale 2 Puffs by mouth 4 times daily if needed. 3/26/17    Lilly Cabral   aspirin (ECOTRIN) 81 mg enteric coated tablet Take 1 tablet by mouth once daily with a meal. 3/26/17   5/10/2017 at 0800 Lilly Cabral   atenolol (TENORMIN) 50 mg tablet Take 1 tablet by mouth once daily. 5/8/17    Boo Celeset MD   blood sugar diagnostic strip Dispense item covered by pt ins. 250.02 NIDDM type II, uncontrolled - Test 4 times/day. Reason: High A1C 9/9/15    Boo Celeste MD   blood-glucose meter Dispense item covered by pt ins. 250.02 NIDDM type II, uncontrolled - Test 4 times/day. Reason: High A1C 9/9/15    Boo Celeste MD   CPAP CPAP Supplies -- heated humidifier, mask, headgear, filters and tubing. For home use.  Length of Need: 99 mo 9/2/15    Boo Celeste MD   glipiZIDE-metFORMIN (METAGLIP) 2.5-500 mg per tablet TAKE 0.5-1 TABLETS BY MOUTH 2 TIMES DAILY WITH MEALS. - WATCH FOR LOW BLOOD SUGARS  Patient taking differently: 1 tablet. TAKE 1 TABLET BY MOUTH 2 TIMES DAILY WITH MEALS. - WATCH FOR LOW BLOOD SUGARS 5/8/17    Boo Celeste MD   lancets Dispense item covered by pt ins. 250.02 NIDDM type II, uncontrolled - Test 4 times/day. Reason: High A1C 9/9/15    Boo Celeste MD   lisinopril (PRINIVIL; ZESTRIL) 20 mg tablet Take 1 tablet by  mouth once daily. 5/8/17    Boo Celeste MD   metFORMIN (GLUCOPHAGE) 850 mg tablet Take 1 tablet by mouth 2 times daily with meals. -- with glipizide/metformin combo tablet 5/8/17    Boo Celeste MD   multivitamin (MVI) tablet Take 1 tablet by mouth once daily. 8/20/14    Boo Celeste MD   polyethylene glycol-electrolyte (NULYTELY) 420 gram solution Take 240 mL by mouth every 10 minutes. 5/17/17    Alicia Xiao MD   simvastatin (ZOCOR) 10 mg tablet Take 1 tablet by mouth at bedtime. 5/8/17    Boo Celeste MD   spironolactone (ALDACTONE) 25 mg tablet Take 1 tablet by mouth once daily. 5/8/17    Boo Celeste MD       PRE-SEDATION ASSESSMENT:    Lung Exam:  Normal  Heart Exam:  Normal    Comment(s):      IMPRESSION:  Need for screening colonoscopy.    PLAN:  I discussed screening colonoscopy with the patient. Anesthesia coverage requested due to ASA class 3.    Alicia Xiao MD

## 2018-01-05 NOTE — OR ANESTHESIA
Patient Information     Patient Name MRN Sex     Willis Oliveira [id number] Male 1965      OR Anesthesia by Jaylon Morgan CRNA at 2017 12:28 PM     Author:  Jaylon Morgan CRNA Service:  (none) Author Type:  NURS- Nurse Anesthetist     Filed:  2017 12:29 PM Date of Service:  2017 12:28 PM Status:  Signed     :  Jaylon Morgan CRNA (NURS- Nurse Anesthetist)            Anesthesia Post Operative Care Note    Name: Willis Oliveira  MRN:   [id number]  :    1965       Procedure Done:  See Surgeon Note   Case Cancelled for Anesthetic Reason:  No      Anesthesia Technique    Anesthetic Type:  MAC       MAC Type:  NC     Oral Trauma:  No    Intraoperative Course   Hemodynamics:  Stable    Ventilation Normal:  Yes Lung Sounds:  Normal      PACU Course        Nondepolarizer Used:       Reversed: N/A   Hemodynamics:  Stable      Hydration: Euvolemic   Temperature:  36.1 - 38.3      Mental Status:  Awake, alert, follows commands   Pain Management:  Adequate   Regional Block:  No   Anesthesia Complications:  None      Vital Signs:  Temp: 97  F (36.1  C)  Pulse: 55  BP: 128/86  Resp: 16  SpO2: 96 %    O2 Device: Room Air                  Active Lines:  Patient Lines/Drains/Airways Status    Active Line     Name: Placement date: Placement time: Site: Days:    PERIPHERAL VAD Right Hand 17   1040   Hand   less than 1                Intake & Output:       Labs:  No results for input(s): ZE1RSIZFCPP, QFP8XEIWZFNW, PHARTERIAL, PNL0RBAZFEZD, K1GYWEDSIPSB in the last 24 hours.    No results for input(s): MAGNESIUM in the last 24 hours.    No results for input(s): GLUCOSEMETER in the last 720 hours.        Jaylon Morgan CRNA ....................  2017   12:28 PM

## 2018-01-05 NOTE — OR NURSING
Patient Information     Patient Name MRN Sex Willis Richardson [id number] Male 1965      OR Nursing by Kyara Hall RN at 2017 10:57 AM     Author:  Kyara Hall RN Service:  (none) Author Type:  NURS- Registered Nurse     Filed:  2017 11:05 AM Date of Service:  2017 10:57 AM Status:  Signed     :  Kyara Hall RN (NURS- Registered Nurse)            Handoff report from Cara Beatty RN

## 2018-01-05 NOTE — PROCEDURES
Patient Information     Patient Name MRN Sex Willis Richardson [id number] Male 1965      Procedures by Alicia Xiao MD at 2017 11:26 AM     Author:  Alicia Xiao MD Service:  (none) Author Type:  Physician     Filed:  2017 11:28 AM Date of Service:  2017 11:26 AM Status:  Signed     :  Alicia Xiao MD (Physician)        Pre-procedure Diagnoses:    1. Special screening for malignant neoplasms, colon [Z12.11]           Post-procedure Diagnoses:    1. Polyp of transverse colon, unspecified type [D12.3]    2. Polyp of sigmoid colon, unspecified type [D12.5]           Procedures:    1. NC COLONOSCOPY REMOVE HUGO POLYP LESN HOT FANTASMAY FORCEP [31275.0]               PROCEDURE NOTE    SURGEON: Alicia Xiao MD.    PRE-OP DIAGNOSIS:  Screening Colonoscopy      POST-OP DIAGNOSIS: colon polyps transverse and sigmoid colon    PROCEDURE:  Colonoscopy with polypectomies hot forceps    ESTIMATED BLOOD LOSS: none    COMPLICATIONS:  None    SPECIMEN:  Transverse and sigmoid colon polyps    ANESTHESIA:  See anesthesia note, anesthesia requested due to: ASA class 3    INDICATION FOR THE PROCEDURE: The patient is a 51 y.o. male. The patient has no complaints. I explained to the patient the risks, benefits and alternatives to screening colonoscopy for evaluating the colon for colon polyps and colon cancer. We specifically discussed the risks of bleeding, infection, perforation, potential inability to reach the cecum and the risks of sedation. The patient's questions were answered and the patient wished to proceed. Informed consent paperwork was completed.    PROCEDURE: The patient was taken to the endoscopy suite. Appropriate monitors were attached. The patient was placed in the left lateral decubitus position.Timeout was performed confirming the patient's identity and procedure to be performed.  After appropriate sedation was confirmed, digital rectal exam was performed.  There was normal tone and  no gross abnormality was noted. The lubricated colonoscope was introduced into the anus the colon was insufflated with air. The prep quality was adequate. Under direct visualization the scope was advanced to the cecum. The ileocecal valve was intubated and the terminal ileum inspected. No gross abnormality was noted. The scope was withdrawn back into the cecum. The mucosa of colon was inspected while withdrawing the scope. A small, less than 5 mm, polyp was noted in the transverse colon and removed with hot forceps. A small sessile polyp. 3 mm was noted in the distal sigmoid colon and removed with hot forceps. The scope was retroflexed in the rectum and the anorectal junction was inspected. No abnormalities were noted. The scope was returned to a neutral position and the colon was decompressed. The scope was removed. The patient tolerated the procedure with no immediately apparent complication. The patient was taken to recovery in stable condition.      FOLLOW UP:  RECOMMEND high fiber diet, follow up: will call with pathology results.    Alicia Xiao MD

## 2018-01-26 ENCOUNTER — DOCUMENTATION ONLY (OUTPATIENT)
Dept: FAMILY MEDICINE | Facility: OTHER | Age: 53
End: 2018-01-26

## 2018-01-26 PROBLEM — E11.9 TYPE 2 DIABETES MELLITUS WITHOUT COMPLICATION, WITHOUT LONG-TERM CURRENT USE OF INSULIN (H): Status: ACTIVE | Noted: 2018-01-26

## 2018-01-26 PROBLEM — E78.2 MIXED HYPERLIPIDEMIA: Status: ACTIVE | Noted: 2017-05-08

## 2018-01-26 PROBLEM — I10 HYPERTENSION: Status: ACTIVE | Noted: 2018-01-26

## 2018-01-26 PROBLEM — Z12.11 SPECIAL SCREENING FOR MALIGNANT NEOPLASMS, COLON: Status: ACTIVE | Noted: 2017-05-17

## 2018-01-26 PROBLEM — Q74.2 CONGENITAL ANOMALIES OF FOOT, NOT ELSEWHERE CLASSIFIED: Status: ACTIVE | Noted: 2018-01-26

## 2018-01-26 RX ORDER — LANCETS 23 GAUGE
EACH MISCELLANEOUS
COMMUNITY
Start: 2015-09-09

## 2018-01-26 RX ORDER — ASPIRIN 81 MG/1
81 TABLET ORAL
COMMUNITY
Start: 2017-03-26

## 2018-01-26 RX ORDER — SIMVASTATIN 10 MG
10 TABLET ORAL AT BEDTIME
COMMUNITY
Start: 2017-05-08 | End: 2018-05-22

## 2018-01-26 RX ORDER — METOPROLOL SUCCINATE 50 MG/1
50 TABLET, EXTENDED RELEASE ORAL DAILY
COMMUNITY
Start: 2017-08-08 | End: 2018-06-27

## 2018-01-26 RX ORDER — BLOOD-GLUCOSE METER
EACH MISCELLANEOUS
COMMUNITY
Start: 2015-09-09 | End: 2018-08-21

## 2018-01-26 RX ORDER — ALBUTEROL SULFATE 90 UG/1
2 AEROSOL, METERED RESPIRATORY (INHALATION) 4 TIMES DAILY PRN
COMMUNITY
Start: 2017-03-26 | End: 2018-06-27

## 2018-01-26 RX ORDER — LISINOPRIL 20 MG/1
20 TABLET ORAL DAILY
COMMUNITY
Start: 2017-05-08 | End: 2018-06-27

## 2018-01-26 RX ORDER — SPIRONOLACTONE 25 MG/1
25 TABLET ORAL DAILY
COMMUNITY
Start: 2017-05-08 | End: 2018-04-29

## 2018-01-26 RX ORDER — GLIPIZIDE AND METFORMIN HCL 2.5; 5 MG/1; MG/1
.5-1 TABLET, FILM COATED ORAL 2 TIMES DAILY WITH MEALS
COMMUNITY
Start: 2017-05-08 | End: 2018-06-27

## 2018-01-26 RX ORDER — BLOOD-GLUCOSE METER
EACH MISCELLANEOUS
COMMUNITY
Start: 2015-09-09

## 2018-01-26 RX ORDER — DIPHENOXYLATE HYDROCHLORIDE AND ATROPINE SULFATE 2.5; .025 MG/1; MG/1
1 TABLET ORAL DAILY
COMMUNITY
Start: 2014-08-20

## 2018-01-27 VITALS
OXYGEN SATURATION: 96 % | SYSTOLIC BLOOD PRESSURE: 112 MMHG | DIASTOLIC BLOOD PRESSURE: 80 MMHG | WEIGHT: 228 LBS | BODY MASS INDEX: 34.67 KG/M2 | TEMPERATURE: 98 F | HEART RATE: 78 BPM

## 2018-01-27 VITALS
BODY MASS INDEX: 35.63 KG/M2 | HEIGHT: 68 IN | HEART RATE: 60 BPM | WEIGHT: 235.13 LBS | SYSTOLIC BLOOD PRESSURE: 130 MMHG | TEMPERATURE: 97 F | DIASTOLIC BLOOD PRESSURE: 82 MMHG

## 2018-01-27 VITALS
HEIGHT: 68 IN | HEART RATE: 68 BPM | WEIGHT: 228 LBS | SYSTOLIC BLOOD PRESSURE: 122 MMHG | BODY MASS INDEX: 34.56 KG/M2 | DIASTOLIC BLOOD PRESSURE: 80 MMHG

## 2018-02-04 ASSESSMENT — PATIENT HEALTH QUESTIONNAIRE - PHQ9: SUM OF ALL RESPONSES TO PHQ QUESTIONS 1-9: 0

## 2018-04-29 DIAGNOSIS — I10 ESSENTIAL HYPERTENSION: Primary | ICD-10-CM

## 2018-04-29 NOTE — LETTER
May 2, 2018      Willis Oliveira  [address]  [facility] [place] [facility] [address]        Dear Mr. Oliveira,    Your pharmacy has requested a refill of Spironolactone. A 90-day refill of this medication was ordered.     According to our records, you are due for annual medication management and labs. Also, Dr. Boo Celeste had requested a 6 month follow-up visit related to diabetes. Your health is very important to us. Please contact our scheduling line at [phone] to set up this appointment at your earliest convenience, and before your next medication refills are needed.     Thank you for choosing [facility] and [facility] for your health care needs.     Sincerely,        The Refill Nurses  [facility] and [facility]

## 2018-05-02 RX ORDER — SPIRONOLACTONE 25 MG/1
TABLET ORAL
Qty: 90 TABLET | Refills: 0 | Status: SHIPPED | OUTPATIENT
Start: 2018-05-02 | End: 2018-06-27

## 2018-05-02 NOTE — TELEPHONE ENCOUNTER
Prescription approved per [facility] Refill Protocol.  Patient is now due for annual medication management and labs. Reminder letter sent, and audi refill given.  Antoinette Lynch RN on 5/2/2018 at 3:30 PM

## 2018-05-22 DIAGNOSIS — E78.2 MIXED HYPERLIPIDEMIA: Primary | ICD-10-CM

## 2018-05-22 NOTE — LETTER
May 24, 2018      Willis BULLARD Roxanne  [address]  [facility] [place] [facility] [place] [address]        Dear Mr. Meeks,    Your pharmacy has requested a refill of Simvastatin.  A 90-day supply of this medication has been sent.     According to our records, you are overdue for annual medication management and labs. Your last visit with Dr. Celeste was on 5/8/2017. Your health is very important to us. Please contact our scheduling line at [phone] to set up this appointment at your earliest convenience, and before your next medication refills are needed.     Thank you for choosing [facility] for your health care needs.     Sincerely,        The Refill Nurse  [facility]

## 2018-05-24 RX ORDER — SIMVASTATIN 10 MG
TABLET ORAL
Qty: 90 TABLET | Refills: 0 | Status: SHIPPED | OUTPATIENT
Start: 2018-05-24 | End: 2018-06-27

## 2018-05-24 NOTE — TELEPHONE ENCOUNTER
Prescription approved per [facility] Refill Protocol.  Patient is now due for annual medication management and labs. Reminder letter sent, and audi refill given.  Antoinette Lynch RN on 5/24/2018 at 3:16 PM

## 2018-06-26 ENCOUNTER — TELEPHONE (OUTPATIENT)
Dept: INTERNAL MEDICINE | Facility: OTHER | Age: 53
End: 2018-06-26

## 2018-06-26 DIAGNOSIS — E78.2 MIXED HYPERLIPIDEMIA: ICD-10-CM

## 2018-06-26 DIAGNOSIS — E11.9 TYPE 2 DIABETES MELLITUS WITHOUT COMPLICATION, WITHOUT LONG-TERM CURRENT USE OF INSULIN (H): Primary | ICD-10-CM

## 2018-06-26 DIAGNOSIS — I10 ESSENTIAL HYPERTENSION: ICD-10-CM

## 2018-06-26 NOTE — TELEPHONE ENCOUNTER
Patient is scheduled for medication management 8-21-18. He needs refills prior to appointment, please call.

## 2018-06-27 RX ORDER — SPIRONOLACTONE 25 MG/1
25 TABLET ORAL DAILY
Qty: 90 TABLET | Refills: 3 | Status: SHIPPED | OUTPATIENT
Start: 2018-06-27 | End: 2018-08-21

## 2018-06-27 RX ORDER — SIMVASTATIN 10 MG
10 TABLET ORAL AT BEDTIME
Qty: 90 TABLET | Refills: 3 | Status: SHIPPED | OUTPATIENT
Start: 2018-06-27 | End: 2018-08-21

## 2018-06-27 RX ORDER — GLIPIZIDE AND METFORMIN HCL 2.5; 5 MG/1; MG/1
1 TABLET, FILM COATED ORAL 2 TIMES DAILY WITH MEALS
Qty: 180 TABLET | Refills: 3 | Status: SHIPPED | OUTPATIENT
Start: 2018-06-27 | End: 2018-08-21

## 2018-06-27 RX ORDER — LISINOPRIL 20 MG/1
20 TABLET ORAL DAILY
Qty: 90 TABLET | Refills: 3 | Status: SHIPPED | OUTPATIENT
Start: 2018-06-27 | End: 2018-08-21

## 2018-06-27 RX ORDER — METOPROLOL SUCCINATE 50 MG/1
50 TABLET, EXTENDED RELEASE ORAL DAILY
Qty: 90 TABLET | Refills: 3 | Status: SHIPPED | OUTPATIENT
Start: 2018-06-27 | End: 2018-08-21

## 2018-06-27 NOTE — TELEPHONE ENCOUNTER
Patient confirmed medications on med list, orders pending.  Patient buys his Multi Vitamin and ASA 81 EC OTC.  Patient declines needing refills for diabetic supplies at this time.       Claudette Ruiz LPN 6/27/2018 10:46 AM

## 2018-06-28 DIAGNOSIS — I10 ESSENTIAL HYPERTENSION: ICD-10-CM

## 2018-06-29 RX ORDER — LISINOPRIL 20 MG/1
TABLET ORAL
Qty: 90 TABLET | Refills: 3 | OUTPATIENT
Start: 2018-06-29

## 2018-06-29 NOTE — TELEPHONE ENCOUNTER
Lisinopril refilled on 6/27/18 #90 x 3 refills to [facility].  Sandra Frey RN on 6/29/2018 at 4:01 PM

## 2018-07-15 DIAGNOSIS — E11.9 TYPE 2 DIABETES MELLITUS WITHOUT COMPLICATION, WITHOUT LONG-TERM CURRENT USE OF INSULIN (H): ICD-10-CM

## 2018-07-18 RX ORDER — GLIPIZIDE AND METFORMIN HCL 2.5; 5 MG/1; MG/1
TABLET, FILM COATED ORAL
Qty: 180 TABLET | Refills: 3 | OUTPATIENT
Start: 2018-07-18

## 2018-07-18 NOTE — TELEPHONE ENCOUNTER
Refused, Filled 6-27-18 for #180 X 3 refills. Has refills left. Antoinette Lynch RN on 7/18/2018 at 8:45 AM

## 2018-07-23 NOTE — PROGRESS NOTES
Patient Information     Patient Name  Willis Oliveira MRN  [id number] Sex  Male   1965      Letter by Boo Celeste MD at      Author:  Boo Celeste MD Service:  (none) Author Type:  (none)    Filed:   Encounter Date:  2017 Status:  (Other)           Willis Oliveira  [address]  [facility] [address]          2017    Dear Mr. Oliveira:    A refill of   glipiZIDE-metFORMIN (METAGLIP) 2.5-500 mg per tablet has been called into your pharmacy.    Additional refills require an appointment with Boo Celeste MD and labs.   Please call the clinic at [phone] to schedule your appointment.    Thank you,    The Refill Nurse  [facility]

## 2018-07-24 NOTE — PROGRESS NOTES
Patient Information     Patient Name  Willis Oliveira MRN  [id number] Sex  Male   1965      Letter by Boo Celeste MD at      Author:  Boo Celeste MD Service:  (none) Author Type:  (none)    Filed:   Encounter Date:  2017 Status:  (Other)           Willis Oliveira  [address]  [facility] [address]          May 8, 2017    Dear Mr. Oliveira:    Following are the tests completed during your last clinic visit.  The results of these tests are included below.      Your labs look very good.  Continue current medications.    Colonoscopy is due, orders placed.  - they will call with date/time of appointment.      Results for orders placed or performed in visit on 17      CBC W PLT NO DIFF      Result  Value Ref Range    WHITE BLOOD COUNT         8.3 4.5 - 11.0 thou/cu mm    RED BLOOD COUNT           4.80 4.30 - 5.90 mil/cu mm    HEMOGLOBIN                14.9 13.5 - 17.5 g/dL    HEMATOCRIT                44.8 37.0 - 53.0 %    MCV                       93 80 - 100 fL    MCH                       31.0 26.0 - 34.0 pg    MCHC                      33.2 32.0 - 36.0 g/dL    RDW                       11.6 11.5 - 15.5 %    PLATELET COUNT            287 140 - 440 thou/cu mm    MPV                       7.4 6.5 - 11.0 fL   COMP METABOLIC PANEL      Result  Value Ref Range    SODIUM 139 133 - 143 mmol/L    POTASSIUM 4.2 3.5 - 5.1 mmol/L    CHLORIDE 101 98 - 107 mmol/L    CO2,TOTAL 25 21 - 31 mmol/L    ANION GAP 13 5 - 18                    GLUCOSE 98 70 - 105 mg/dL    CALCIUM 9.7 8.6 - 10.3 mg/dL    BUN 14 7 - 25 mg/dL    CREATININE 0.97 0.70 - 1.30 mg/dL    BUN/CREAT RATIO           14                    GFR if African American >60 >60 ml/min/1.73m2    GFR if not African American >60 >60 ml/min/1.73m2    ALBUMIN 4.7 3.5 - 5.7 g/dL    PROTEIN,TOTAL 7.6 6.4 - 8.9 g/dL    GLOBULIN                  2.9 2.0 - 3.7 g/dL    A/G RATIO 1.6 1.0 - 2.0                    BILIRUBIN,TOTAL 0.4 0.3 - 1.0 mg/dL    ALK  PHOSPHATASE 45 34 - 104 IU/L    ALT (SGPT) 33 7 - 52 IU/L    AST (SGOT) 27 13 - 39 IU/L   HEMOGLOBIN A1C MONITORING (POCT)      Result  Value Ref Range    HEMOGLOBIN A1C MONITORING (POCT) 6.1 4.0 - 6.2 %    ESTIMATED AVERAGE GLUCOSE  128 mg/dL   LIPID PANEL      Result  Value Ref Range    CHOLESTEROL,TOTAL 127 <200 mg/dL    TRIGLYCERIDES 127 <150 mg/dL    HDL CHOLESTEROL 45 23 - 92 mg/dL    NON-HDL CHOLESTEROL 82 <145 mg/dl    CHOL/HDL RATIO            2.82 <4.50                    LDL CHOLESTEROL 57 <100 mg/dL    PATIENT STATUS            NOT GIVEN                   URINALYSIS W REFLEX MICROSCOPIC IF POSITIVE      Result  Value Ref Range    COLOR                     Yellow Yellow Color    CLARITY                   Clear Clear Clarity    SPECIFIC GRAVITY,URINE    1.010 1.010, 1.015, 1.020, 1.025                    PH,URINE                  5.0 (A) 6.0, 7.0, 8.0, 5.5, 6.5, 7.5, 8.5                    UROBILINOGEN,QUALITATIVE  Normal Normal EU/dl    PROTEIN, URINE Negative Negative mg/dL    GLUCOSE, URINE Negative Negative mg/dL    KETONES,URINE             Negative Negative mg/dL    BILIRUBIN,URINE           Negative Negative                    OCCULT BLOOD,URINE        Negative Negative                    NITRITE                   Negative Negative                    LEUKOCYTE ESTERASE        Negative Negative                         If you have any further questions or problems contact my office at  [phone]   --- or send Wordy message --- otherwise schedule an appointment.    Clinic : [phone]  Appointment line: [phone]     Thank you,    Boo Celeste MD    Internal Medicine  [facility]     Reviewed and electronically signed by provider.

## 2018-07-24 NOTE — PROGRESS NOTES
Patient Information     Patient Name  Willis Oliveira MRN  [id number] Sex  Male   1965      Letter by Alicia Xiao MD at      Author:  Alicia Xiao MD Service:  (none) Author Type:  (none)    Filed:   Encounter Date:  2017 Status:  (Other)           Willis Oliveira  [address]  [facility] [address]          May 19, 2017    Dear Mr. Oliveira:    This letter is in regards to your colonoscopy that as done by Alicia Xiao MD on 17.  The polyps removed from your colon were HYPERPLASTIC.  Hyperplastic polyps are not known to be pre-cancerous and are BENIGN.  For that reason, Dr. Alicia Xiao MD recommends a repeat colonoscopy in 10 years unless you have problems.      Dr. Alicia Xiao MD also recommends a high fiber diet. A fiber supplement such as Metamucil, FiberCon, or Citrucel is recommended. Generic forms of these supplements are fine. These are available over the counter.     If you have any questions, please call the Surgery department at [phone].  A copy of this report will be placed in your electronic medical record for your primary care provider's review.    Sincerely,        General Surgery    Reviewed and electronically signed by provider.

## 2018-08-21 ENCOUNTER — OFFICE VISIT (OUTPATIENT)
Dept: INTERNAL MEDICINE | Facility: OTHER | Age: 53
End: 2018-08-21
Attending: INTERNAL MEDICINE
Payer: COMMERCIAL

## 2018-08-21 VITALS
BODY MASS INDEX: 33.81 KG/M2 | WEIGHT: 222.38 LBS | HEART RATE: 64 BPM | SYSTOLIC BLOOD PRESSURE: 122 MMHG | DIASTOLIC BLOOD PRESSURE: 74 MMHG

## 2018-08-21 DIAGNOSIS — Z86.39 HISTORY OF TYPE 2 DIABETES MELLITUS: ICD-10-CM

## 2018-08-21 DIAGNOSIS — Z00.00 ROUTINE GENERAL MEDICAL EXAMINATION AT A HEALTH CARE FACILITY: Primary | ICD-10-CM

## 2018-08-21 DIAGNOSIS — E78.2 MIXED HYPERLIPIDEMIA: ICD-10-CM

## 2018-08-21 DIAGNOSIS — G62.9 NEUROPATHY: ICD-10-CM

## 2018-08-21 DIAGNOSIS — I10 ESSENTIAL HYPERTENSION: ICD-10-CM

## 2018-08-21 DIAGNOSIS — Z12.5 SPECIAL SCREENING FOR MALIGNANT NEOPLASM OF PROSTATE: ICD-10-CM

## 2018-08-21 DIAGNOSIS — L84 PRE-ULCERATIVE CALLUSES: ICD-10-CM

## 2018-08-21 DIAGNOSIS — L28.2 PRURITIC RASH: ICD-10-CM

## 2018-08-21 DIAGNOSIS — R73.03 PREDIABETES: ICD-10-CM

## 2018-08-21 DIAGNOSIS — G47.33 OSA ON CPAP: ICD-10-CM

## 2018-08-21 PROBLEM — E11.9 TYPE 2 DIABETES MELLITUS WITHOUT COMPLICATION, WITHOUT LONG-TERM CURRENT USE OF INSULIN (H): Status: RESOLVED | Noted: 2018-01-26 | Resolved: 2018-08-21

## 2018-08-21 LAB
ALBUMIN SERPL-MCNC: 4.4 G/DL (ref 3.5–5.7)
ALP SERPL-CCNC: 44 U/L (ref 34–104)
ALT SERPL W P-5'-P-CCNC: 25 U/L (ref 7–52)
ANION GAP SERPL CALCULATED.3IONS-SCNC: 8 MMOL/L (ref 3–14)
AST SERPL W P-5'-P-CCNC: 22 U/L (ref 13–39)
BILIRUB SERPL-MCNC: 0.5 MG/DL (ref 0.3–1)
BUN SERPL-MCNC: 13 MG/DL (ref 7–25)
CALCIUM SERPL-MCNC: 9.8 MG/DL (ref 8.6–10.3)
CHLORIDE SERPL-SCNC: 104 MMOL/L (ref 98–107)
CHOLEST SERPL-MCNC: 142 MG/DL
CO2 SERPL-SCNC: 27 MMOL/L (ref 21–31)
CREAT SERPL-MCNC: 0.91 MG/DL (ref 0.7–1.3)
ERYTHROCYTE [DISTWIDTH] IN BLOOD BY AUTOMATED COUNT: 12.6 % (ref 10–15)
GFR SERPL CREATININE-BSD FRML MDRD: 88 ML/MIN/1.7M2
GLUCOSE SERPL-MCNC: 124 MG/DL (ref 70–105)
HBA1C MFR BLD: 6.7 % (ref 4–6)
HCT VFR BLD AUTO: 42.8 % (ref 40–53)
HDLC SERPL-MCNC: 45 MG/DL (ref 23–92)
HGB BLD-MCNC: 14.6 G/DL (ref 13.3–17.7)
LDLC SERPL CALC-MCNC: 67 MG/DL
MCH RBC QN AUTO: 31.3 PG (ref 26.5–33)
MCHC RBC AUTO-ENTMCNC: 34.1 G/DL (ref 31.5–36.5)
MCV RBC AUTO: 92 FL (ref 78–100)
NONHDLC SERPL-MCNC: 97 MG/DL
PLATELET # BLD AUTO: 265 10E9/L (ref 150–450)
POTASSIUM SERPL-SCNC: 4.2 MMOL/L (ref 3.5–5.1)
PROT SERPL-MCNC: 7.2 G/DL (ref 6.4–8.9)
PSA SERPL-ACNC: 0.32 NG/ML
RBC # BLD AUTO: 4.67 10E12/L (ref 4.4–5.9)
SODIUM SERPL-SCNC: 139 MMOL/L (ref 134–144)
TRIGL SERPL-MCNC: 151 MG/DL
WBC # BLD AUTO: 5.4 10E9/L (ref 4–11)

## 2018-08-21 PROCEDURE — 80053 COMPREHEN METABOLIC PANEL: CPT | Performed by: INTERNAL MEDICINE

## 2018-08-21 PROCEDURE — 80061 LIPID PANEL: CPT | Performed by: INTERNAL MEDICINE

## 2018-08-21 PROCEDURE — 36415 COLL VENOUS BLD VENIPUNCTURE: CPT | Performed by: INTERNAL MEDICINE

## 2018-08-21 PROCEDURE — 85027 COMPLETE CBC AUTOMATED: CPT | Performed by: INTERNAL MEDICINE

## 2018-08-21 PROCEDURE — 83036 HEMOGLOBIN GLYCOSYLATED A1C: CPT | Performed by: INTERNAL MEDICINE

## 2018-08-21 PROCEDURE — 99396 PREV VISIT EST AGE 40-64: CPT | Performed by: INTERNAL MEDICINE

## 2018-08-21 PROCEDURE — G0103 PSA SCREENING: HCPCS | Performed by: INTERNAL MEDICINE

## 2018-08-21 RX ORDER — GABAPENTIN 100 MG/1
100 CAPSULE ORAL 4 TIMES DAILY PRN
Qty: 90 CAPSULE | Refills: 11 | Status: SHIPPED | OUTPATIENT
Start: 2018-08-21 | End: 2019-02-28

## 2018-08-21 RX ORDER — SIMVASTATIN 10 MG
10 TABLET ORAL AT BEDTIME
Qty: 90 TABLET | Refills: 3 | Status: SHIPPED | OUTPATIENT
Start: 2018-08-21 | End: 2020-09-17

## 2018-08-21 RX ORDER — BLOOD-GLUCOSE METER
EACH MISCELLANEOUS
Qty: 1 KIT | Refills: 0 | Status: SHIPPED | OUTPATIENT
Start: 2018-08-21

## 2018-08-21 RX ORDER — METOPROLOL SUCCINATE 50 MG/1
50 TABLET, EXTENDED RELEASE ORAL DAILY
Qty: 90 TABLET | Refills: 3 | Status: SHIPPED | OUTPATIENT
Start: 2018-08-21 | End: 2019-10-18

## 2018-08-21 RX ORDER — LISINOPRIL 20 MG/1
20 TABLET ORAL DAILY
Qty: 90 TABLET | Refills: 3 | Status: SHIPPED | OUTPATIENT
Start: 2018-08-21 | End: 2019-09-21

## 2018-08-21 RX ORDER — SPIRONOLACTONE 25 MG/1
25 TABLET ORAL DAILY
Qty: 90 TABLET | Refills: 3 | Status: SHIPPED | OUTPATIENT
Start: 2018-08-21 | End: 2019-10-18

## 2018-08-21 RX ORDER — TRIAMCINOLONE ACETONIDE 1 MG/G
OINTMENT TOPICAL
Qty: 80 G | Refills: 0 | Status: SHIPPED | OUTPATIENT
Start: 2018-08-21 | End: 2019-02-28

## 2018-08-21 RX ORDER — GLIPIZIDE AND METFORMIN HCL 2.5; 5 MG/1; MG/1
1 TABLET, FILM COATED ORAL 2 TIMES DAILY WITH MEALS
Qty: 180 TABLET | Refills: 3 | Status: SHIPPED | OUTPATIENT
Start: 2018-08-21 | End: 2019-09-21

## 2018-08-21 ASSESSMENT — ENCOUNTER SYMPTOMS
MYALGIAS: 0
SHORTNESS OF BREATH: 0
LIGHT-HEADEDNESS: 0
FEVER: 0
VOMITING: 0
PALPITATIONS: 0
NAUSEA: 0
CONFUSION: 0
AGITATION: 0
WHEEZING: 0
CHILLS: 0
COUGH: 0
DYSURIA: 0
ABDOMINAL PAIN: 0
BRUISES/BLEEDS EASILY: 0
DIARRHEA: 0
FATIGUE: 0
HEMATURIA: 0
DIZZINESS: 0
EYE PAIN: 0
ARTHRALGIAS: 0

## 2018-08-21 ASSESSMENT — ANXIETY QUESTIONNAIRES
1. FEELING NERVOUS, ANXIOUS, OR ON EDGE: NOT AT ALL
5. BEING SO RESTLESS THAT IT IS HARD TO SIT STILL: NOT AT ALL
3. WORRYING TOO MUCH ABOUT DIFFERENT THINGS: NOT AT ALL
7. FEELING AFRAID AS IF SOMETHING AWFUL MIGHT HAPPEN: NOT AT ALL
GAD7 TOTAL SCORE: 0
6. BECOMING EASILY ANNOYED OR IRRITABLE: NOT AT ALL
IF YOU CHECKED OFF ANY PROBLEMS ON THIS QUESTIONNAIRE, HOW DIFFICULT HAVE THESE PROBLEMS MADE IT FOR YOU TO DO YOUR WORK, TAKE CARE OF THINGS AT HOME, OR GET ALONG WITH OTHER PEOPLE: NOT DIFFICULT AT ALL
2. NOT BEING ABLE TO STOP OR CONTROL WORRYING: NOT AT ALL

## 2018-08-21 ASSESSMENT — PAIN SCALES - GENERAL: PAINLEVEL: NO PAIN (0)

## 2018-08-21 ASSESSMENT — PATIENT HEALTH QUESTIONNAIRE - PHQ9: 5. POOR APPETITE OR OVEREATING: NOT AT ALL

## 2018-08-21 NOTE — LETTER
Willis Oliveira  [address] [address] [address]    8/22/2018      Dear Willis Oliveira,    The result of your recent tests are included below:    PSA is normal.    Blood counts are normal.    Random blood sugar is high.    Hemoglobin A1c is now in the diabetic range.      To help with weight loss and improve blood sugar control....    -- Try to avoid Carbohydrates as much as possible -- breads, pasta, baked goods, cakes, oatmeal, cold cereal, potatoes.   These are turned to sugar in one metabolic conversion, cause insulin secretion and increased fat deposition / weight gain.      -- Eat more lean meats, proteins, eggs, nuts.       Results for orders placed or performed in visit on 08/21/18   CBC with platelets   Result Value Ref Range    WBC 5.4 4.0 - 11.0 10e9/L    RBC Count 4.67 4.4 - 5.9 10e12/L    Hemoglobin 14.6 13.3 - 17.7 g/dL    Hematocrit 42.8 40.0 - 53.0 %    MCV 92 78 - 100 fl    MCH 31.3 26.5 - 33.0 pg    MCHC 34.1 31.5 - 36.5 g/dL    RDW 12.6 10.0 - 15.0 %    Platelet Count 265 150 - 450 10e9/L   Comprehensive metabolic panel   Result Value Ref Range    Sodium 139 134 - 144 mmol/L    Potassium 4.2 3.5 - 5.1 mmol/L    Chloride 104 98 - 107 mmol/L    Carbon Dioxide 27 21 - 31 mmol/L    Anion Gap 8 3 - 14 mmol/L    Glucose 124 (H) 70 - 105 mg/dL    Urea Nitrogen 13 7 - 25 mg/dL    Creatinine 0.91 0.70 - 1.30 mg/dL    GFR Estimate 88 >60 mL/min/1.7m2    GFR Estimate If Black >90 >60 mL/min/1.7m2    Calcium 9.8 8.6 - 10.3 mg/dL    Bilirubin Total 0.5 0.3 - 1.0 mg/dL    Albumin 4.4 3.5 - 5.7 g/dL    Protein Total 7.2 6.4 - 8.9 g/dL    Alkaline Phosphatase 44 34 - 104 U/L    ALT 25 7 - 52 U/L    AST 22 13 - 39 U/L   Hemoglobin A1c   Result Value Ref Range    Hemoglobin A1C 6.7 (H) 4.0 - 6.0 %   Lipid Profile   Result Value Ref Range    Cholesterol 142 <200 mg/dL    Triglycerides 151 (H) <150 mg/dL    HDL Cholesterol 45 23 - 92 mg/dL    LDL Cholesterol Calculated 67 <100 mg/dL    Non HDL  Cholesterol 97 <130 mg/dL   PSA Screen GH   Result Value Ref Range    PSA Screen 0.316 <3.100 ng/mL       If you have any further questions or problems, please contact my office at [phone] and schedule an appointment.    Clinic : [phone]  Appointment line: [phone]     Thank you,    Boo Celeste MD    Internal Medicine  [facility]     Reviewed and electronically signed by provider.

## 2018-08-21 NOTE — NURSING NOTE
Patient presents to the clinic for medication management, orders pending.    Previous A1C is at goal of <8  No results found for: A1C  Urine microalbumin:creatine: n/a  Foot exam today  Eye exam 2 years ago-plans to make one soon  Tobacco User yes  Patient is on a daily aspirin  Patient is on a Statin.  Blood pressure today of:     BP Readings from Last 1 Encounters:   05/08/17 130/82      is at the goal of <139/89 for diabetics.    Claudette Ruiz LPN on 8/21/2018 at 7:53 AM

## 2018-08-21 NOTE — PATIENT INSTRUCTIONS
1. Routine general medical examination at a health care facility    2. Essential hypertension  - lisinopril (PRINIVIL/ZESTRIL) 20 MG tablet; Take 1 tablet (20 mg) by mouth daily  Dispense: 90 tablet; Refill: 3  - metoprolol succinate (TOPROL-XL) 50 MG 24 hr tablet; Take 1 tablet (50 mg) by mouth daily  Dispense: 90 tablet; Refill: 3  - spironolactone (ALDACTONE) 25 MG tablet; Take 1 tablet (25 mg) by mouth daily  Dispense: 90 tablet; Refill: 3  - CBC with platelets; Standing  - Comprehensive metabolic panel; Standing  - *UA reflex to Microscopic; Standing    3. Mixed hyperlipidemia  - simvastatin (ZOCOR) 10 MG tablet; Take 1 tablet (10 mg) by mouth At Bedtime  Dispense: 90 tablet; Refill: 3  - Lipid Profile; Standing    4. Prediabetes  - glipiZIDE-metFORMIN (METAGLIP) 2.5-500 MG per tablet; Take 1 tablet by mouth 2 times daily (with meals)  Dispense: 180 tablet; Refill: 3  - metFORMIN (GLUCOPHAGE) 850 MG tablet; Take 1 tablet (850 mg) by mouth 2 times daily (with meals)  Dispense: 180 tablet; Refill: 3  - blood glucose monitoring (ACCU-CHEK JAMAL PLUS) meter device kit; Check glucose once daily  Dispense: 1 kit; Refill: 0  - blood glucose monitoring (NO BRAND SPECIFIED) test strip; 1 strip by In Vitro route 4 times daily Use to test blood sugar 4 times daily or as directed.  Dispense: 3 Box; Refill: 3  - Hemoglobin A1c; Standing    To help with weight loss and improve blood sugar control....    -- Try to avoid Carbohydrates as much as possible -- breads, pasta, baked goods, cakes, oatmeal, cold cereal, potatoes.   These are turned to sugar in one metabolic conversion, cause insulin secretion and increased fat deposition / weight gain.      -- Eat more lean meats, proteins, eggs, nuts.      5. Obesity, Class II, BMI 35-39.9, with comorbidity    6. REKHA on CPAP - uses daily, finds helpful  - [facility]LP COMPREHENSIVE DME    7. Special screening for malignant neoplasm of prostate  - PSA Screen GH; Future    8. History of type 2  diabetes mellitus  - glipiZIDE-metFORMIN (METAGLIP) 2.5-500 MG per tablet; Take 1 tablet by mouth 2 times daily (with meals)  Dispense: 180 tablet; Refill: 3  - metFORMIN (GLUCOPHAGE) 850 MG tablet; Take 1 tablet (850 mg) by mouth 2 times daily (with meals)  Dispense: 180 tablet; Refill: 3  - blood glucose monitoring (ACCU-CHEK JAMAL PLUS) meter device kit; Check glucose once daily  Dispense: 1 kit; Refill: 0  - blood glucose monitoring (NO BRAND SPECIFIED) test strip; 1 strip by In Vitro route 4 times daily Use to test blood sugar 4 times daily or as directed.  Dispense: 3 Box; Refill: 3    9. Pre-ulcerative calluses    10. Pruritic rash    START:   - triamcinolone (KENALOG) 0.1 % ointment; Apply sparingly to affected area three times daily for 14 days.  Dispense: 80 g; Refill: 0      + Neuropathy pain.   START:   Neurontin (Gabapentin) 100 mg capsule - take up to 4 times daily as needed for burning/shooting nerve pain.   - take 1 capsule tonight, then 1 capsule twice daily tomorrow, then 1 capsule 3 times daily the following day -- if needed can increase up to 4 times daily.   - continue 3 to 4 capsules daily thereafter...   - if they cause too much sleepiness during the day - okay to use all 3 or 4 at bedtime instead.      Call in 1-2 weeks if rash not better -- would start Keflex at that time. (oral antibiotic).   -- consider dermatology consultation.     Return in approximately 1 year, or sooner as needed for follow-up with Dr. Celeste.    Clinic : [phone]  Appointment line: [phone]

## 2018-08-21 NOTE — LETTER
[address]   [address]            2018      Willis BULLARD Roxanne  [address] [place] [id number]              Dear Mr. Oliveira,      Thank you for your interest in becoming a [employer] user.     Please access the [employer] web site at [email or web address].     Your access code is:  [email or web address]  Activation Code Expiration: 2018  7:57 AM     If you allow your access code to , or if you have any questions please call the [employer] representative at your primary clinic during normal clinic hours.     Sincerely,  [facility]

## 2018-08-21 NOTE — MR AVS SNAPSHOT
After Visit Summary   8/21/2018    Willis Oliveira    MRN: [id number]           Patient Information     Date Of Birth          1965        Visit Information        Provider Department      8/21/2018 8:00 AM Boo Celeste MD [facility]        Today's Diagnoses     Routine general medical examination at a health care facility    -  1    Essential hypertension        Mixed hyperlipidemia        Prediabetes        Obesity, Class II, BMI 35-39.9, with comorbidity        REKHA on CPAP - uses daily, finds helpful        Special screening for malignant neoplasm of prostate        History of type 2 diabetes mellitus        Pre-ulcerative calluses        Pruritic rash        Neuropathy          Care Instructions    1. Routine general medical examination at a health care facility    2. Essential hypertension  - lisinopril (PRINIVIL/ZESTRIL) 20 MG tablet; Take 1 tablet (20 mg) by mouth daily  Dispense: 90 tablet; Refill: 3  - metoprolol succinate (TOPROL-XL) 50 MG 24 hr tablet; Take 1 tablet (50 mg) by mouth daily  Dispense: 90 tablet; Refill: 3  - spironolactone (ALDACTONE) 25 MG tablet; Take 1 tablet (25 mg) by mouth daily  Dispense: 90 tablet; Refill: 3  - CBC with platelets; Standing  - Comprehensive metabolic panel; Standing  - *UA reflex to Microscopic; Standing    3. Mixed hyperlipidemia  - simvastatin (ZOCOR) 10 MG tablet; Take 1 tablet (10 mg) by mouth At Bedtime  Dispense: 90 tablet; Refill: 3  - Lipid Profile; Standing    4. Prediabetes  - glipiZIDE-metFORMIN (METAGLIP) 2.5-500 MG per tablet; Take 1 tablet by mouth 2 times daily (with meals)  Dispense: 180 tablet; Refill: 3  - metFORMIN (GLUCOPHAGE) 850 MG tablet; Take 1 tablet (850 mg) by mouth 2 times daily (with meals)  Dispense: 180 tablet; Refill: 3  - blood glucose monitoring (ACCU-CHEK JAMAL PLUS) meter device kit; Check glucose once daily  Dispense: 1 kit; Refill: 0  - blood glucose monitoring (NO BRAND SPECIFIED)  test strip; 1 strip by In Vitro route 4 times daily Use to test blood sugar 4 times daily or as directed.  Dispense: 3 Box; Refill: 3  - Hemoglobin A1c; Standing    To help with weight loss and improve blood sugar control....    -- Try to avoid Carbohydrates as much as possible -- breads, pasta, baked goods, cakes, oatmeal, cold cereal, potatoes.   These are turned to sugar in one metabolic conversion, cause insulin secretion and increased fat deposition / weight gain.      -- Eat more lean meats, proteins, eggs, nuts.      5. Obesity, Class II, BMI 35-39.9, with comorbidity    6. REKHA on CPAP - uses daily, finds helpful  - SLP COMPREHENSIVE DME    7. Special screening for malignant neoplasm of prostate  - PSA Screen GH; Future    8. History of type 2 diabetes mellitus  - glipiZIDE-metFORMIN (METAGLIP) 2.5-500 MG per tablet; Take 1 tablet by mouth 2 times daily (with meals)  Dispense: 180 tablet; Refill: 3  - metFORMIN (GLUCOPHAGE) 850 MG tablet; Take 1 tablet (850 mg) by mouth 2 times daily (with meals)  Dispense: 180 tablet; Refill: 3  - blood glucose monitoring (ACCU-CHEK JAMAL PLUS) meter device kit; Check glucose once daily  Dispense: 1 kit; Refill: 0  - blood glucose monitoring (NO BRAND SPECIFIED) test strip; 1 strip by In Vitro route 4 times daily Use to test blood sugar 4 times daily or as directed.  Dispense: 3 Box; Refill: 3    9. Pre-ulcerative calluses    10. Pruritic rash    START:   - triamcinolone (KENALOG) 0.1 % ointment; Apply sparingly to affected area three times daily for 14 days.  Dispense: 80 g; Refill: 0      + Neuropathy pain.   START:   Neurontin (Gabapentin) 100 mg capsule - take up to 4 times daily as needed for burning/shooting nerve pain.   - take 1 capsule tonight, then 1 capsule twice daily tomorrow, then 1 capsule 3 times daily the following day -- if needed can increase up to 4 times daily.   - continue 3 to 4 capsules daily thereafter...   - if they cause too much sleepiness during  the day - okay to use all 3 or 4 at bedtime instead.      Call in 1-2 weeks if rash not better -- would start Keflex at that time. (oral antibiotic).   -- consider dermatology consultation.     Return in approximately 1 year, or sooner as needed for follow-up with Dr. Celeste.    Clinic : [phone]  Appointment line: [phone]            Follow-ups after your visit        Future tests that were ordered for you today     Open Standing Orders        Priority Remaining Interval Expires Ordered    CBC with platelets Routine 4/4 Every 3 Months 8/21/2019 8/21/2018    Comprehensive metabolic panel Routine 4/4 Every 3 Months 8/21/2019 8/21/2018    Hemoglobin A1c Routine 4/4 Every 3 Months 8/21/2019 8/21/2018    Lipid Profile Routine 4/4 Every 3 Months 8/21/2019 8/21/2018    *UA reflex to Microscopic Routine 4/4 Every 3 Months 8/21/2019 8/21/2018          Open Future Orders        Priority Expected Expires Ordered    PSA Screen GH Routine  8/21/2019 8/21/2018            Who to contact     If you have questions or need follow up information about today's clinic visit or your schedule please contact [facility] directly at [phone].  Normal or non-critical lab and imaging results will be communicated to you by [employer], letter or phone within 4 business days after the clinic has received the results. If you do not hear from us within 7 days, please contact the clinic through [employer] or phone. If you have a critical or abnormal lab result, we will notify you by phone as soon as possible.  Submit refill requests through [employer] or call your pharmacy and they will forward the refill request to us. Please allow 3 business days for your refill to be completed.          Additional Information About Your Visit        Play[employer] Information     [employer] lets you send messages to your doctor, view your test results, renew your prescriptions, schedule appointments and more. To sign up, go to  "[email or web address] . Click on \"Log in\" on the left side of the screen, which will take you to the Welcome page. Then click on \"Sign up Now\" on the right side of the page.     You will be asked to enter the access code listed below, as well as some personal information. Please follow the directions to create your username and password.     Your access code is: [email or web address]  Expires: 2018  7:57 AM     Your access code will  in 90 days. If you need help or a new code, please call your [facility] clinic or [phone].        Care EveryWhere ID     This is your Care EveryWhere ID. This could be used by other organizations to access your [facility] medical records  [id number]        Your Vitals Were     Pulse BMI (Body Mass Index)                64 33.81 kg/m2           Blood Pressure from Last 3 Encounters:   18 122/74   17 130/82   17 112/80    Weight from Last 3 Encounters:   18 222 lb 6 oz (100.9 kg)   17 235 lb 2 oz (106.7 kg)   17 228 lb (103.4 kg)              We Performed the Following     SLP COMPREHENSIVE DME          Today's Medication Changes          These changes are accurate as of 18  8:26 AM.  If you have any questions, ask your nurse or doctor.               Start taking these medicines.        Dose/Directions    gabapentin 100 MG capsule   Commonly known as:  NEURONTIN   Used for:  Neuropathy   Started by:  Boo Celeste MD        Dose:  100 mg   Take 1 capsule (100 mg) by mouth 4 times daily as needed - for burning/shooting nerve pain   Quantity:  90 capsule   Refills:  11       triamcinolone 0.1 % ointment   Commonly known as:  KENALOG   Used for:  Pruritic rash   Started by:  Boo Celeste MD        Apply sparingly to affected area three times daily for 14 days.   Quantity:  80 g   Refills:  0         These medicines have changed or have updated prescriptions.        Dose/Directions    blood glucose monitoring test strip   Commonly known " as:  no brand specified   This may have changed:  See the new instructions.   Used for:  Prediabetes, History of type 2 diabetes mellitus   Changed by:  Boo Celeste MD        Dose:  1 strip   1 strip by In Vitro route 4 times daily Use to test blood sugar 4 times daily or as directed.   Quantity:  3 Box   Refills:  3       * [employer]K BLOOD GLUCOSE MONITOR w/Device Kit   This may have changed:  Another medication with the same name was changed. Make sure you understand how and when to take each.   Changed by:  Boo Celeste MD        Dispense item covered by pt ins. 250.02 NIDDM type II, uncontrolled - Test 4 times/day. Reason: High A1C   Refills:  0       * blood glucose monitoring meter device kit   This may have changed:  additional instructions   Used for:  Prediabetes, History of type 2 diabetes mellitus   Changed by:  Boo Celeste MD        Check glucose once daily   Quantity:  1 kit   Refills:  0       * Notice:  This list has 2 medication(s) that are the same as other medications prescribed for you. Read the directions carefully, and ask your doctor or other care provider to review them with you.         Where to get your medicines      These medications were sent to [facility] [address] [facility] - [place], [place] - [address]  [address] [facility] [address]     Phone:  [phone]     blood glucose monitoring meter device kit    blood glucose monitoring test strip    gabapentin 100 MG capsule    glipiZIDE-metFORMIN 2.5-500 MG per tablet    lisinopril 20 MG tablet    metFORMIN 850 MG tablet    metoprolol succinate 50 MG 24 hr tablet    simvastatin 10 MG tablet    spironolactone 25 MG tablet    triamcinolone 0.1 % ointment                Primary Care Provider Office Phone # Fax #    Boo Celeste [phone] [address]  [facility] [address]        Equal Access to Services     THIERRY MIGUEL AH: jesica Salazar, emeli  donta roach haytena priestcristóbal flannery ah. [facility] [phone].    ATENCIÓN: Si rocio corey, tiene a cervantes disposición servicios gratuitos de asistencia lingüística. Jc al [phone].    We comply with applicable federal civil rights laws and [place] laws. We do not discriminate on the basis of race, color, national origin, age, disability, sex, sexual orientation, or gender identity.            Thank you!     Thank you for choosing [facility]  for your care. Our goal is always to provide you with excellent care. Hearing back from our patients is one way we can continue to improve our services. Please take a few minutes to complete the written survey that you may receive in the mail after your visit with us. Thank you!             Your Updated Medication List - Protect others around you: Learn how to safely use, store and throw away your medicines at www.disposemymeds.org.          This list is accurate as of 8/21/18  8:26 AM.  Always use your most recent med list.                   Brand Name Dispense Instructions for use Diagnosis    aspirin 81 MG EC tablet      Take 81 mg by mouth daily with food        blood glucose monitoring test strip    no brand specified    3 Box    1 strip by In Vitro route 4 times daily Use to test blood sugar 4 times daily or as directed.    Prediabetes, History of type 2 diabetes mellitus       gabapentin 100 MG capsule    NEURONTIN    90 capsule    Take 1 capsule (100 mg) by mouth 4 times daily as needed - for burning/shooting nerve pain    Neuropathy       glipiZIDE-metFORMIN 2.5-500 MG per tablet    METAGLIP    180 tablet    Take 1 tablet by mouth 2 times daily (with meals)    Prediabetes, History of type 2 diabetes mellitus       lancets 28G Misc      Dispense item covered by pt ins. 250.02 NIDDM type II, uncontrolled - Test 4 times/day. Reason: High A1C        lisinopril 20 MG tablet    PRINIVIL/ZESTRIL    90 tablet    Take 1 tablet  (20 mg) by mouth daily    Essential hypertension       metFORMIN 850 MG tablet    GLUCOPHAGE    180 tablet    Take 1 tablet (850 mg) by mouth 2 times daily (with meals)    Prediabetes, History of type 2 diabetes mellitus       metoprolol succinate 50 MG 24 hr tablet    TOPROL-XL    90 tablet    Take 1 tablet (50 mg) by mouth daily    Essential hypertension       MULTI-VITAMINS Tabs      Take 1 tablet by mouth daily        simvastatin 10 MG tablet    ZOCOR    90 tablet    Take 1 tablet (10 mg) by mouth At Bedtime    Mixed hyperlipidemia       spironolactone 25 MG tablet    ALDACTONE    90 tablet    Take 1 tablet (25 mg) by mouth daily    Essential hypertension       * [employer] BLOOD GLUCOSE MONITOR w/Device Kit      Dispense item covered by pt ins. 250.02 NIDDM type II, uncontrolled - Test 4 times/day. Reason: High A1C        * blood glucose monitoring meter device kit     1 kit    Check glucose once daily    Prediabetes, History of type 2 diabetes mellitus       triamcinolone 0.1 % ointment    KENALOG    80 g    Apply sparingly to affected area three times daily for 14 days.    Pruritic rash       * Notice:  This list has 2 medication(s) that are the same as other medications prescribed for you. Read the directions carefully, and ask your doctor or other care provider to review them with you.

## 2018-08-21 NOTE — PROGRESS NOTES
Nursing Notes:   Claudette Ruiz LPN  8/21/2018  8:11 AM  Signed  Patient presents to the clinic for medication management, orders pending.    Previous A1C is at goal of <8  No results found for: A1C  Urine microalbumin:creatine: n/a  Foot exam today  Eye exam 2 years ago-plans to make one soon  Tobacco User yes  Patient is on a daily aspirin  Patient is on a Statin.  Blood pressure today of:     BP Readings from Last 1 Encounters:   05/08/17 130/82      is at the goal of <139/89 for diabetics.    Claudette Ruiz LPN on 8/21/2018 at 7:53 AM      Nursing note reviewed with patient.  Accurracy and completeness verified.   Mr. Oliveira is a 52 year old male who:  Patient presents with:  Recheck Medication    HPI     ICD-10-CM    1. Routine general medical examination at a health care facility Z00.00    2. Essential hypertension I10 lisinopril (PRINIVIL/ZESTRIL) 20 MG tablet     metoprolol succinate (TOPROL-XL) 50 MG 24 hr tablet     spironolactone (ALDACTONE) 25 MG tablet     CBC with platelets     Comprehensive metabolic panel     *UA reflex to Microscopic     CBC with platelets     Comprehensive metabolic panel   3. Mixed hyperlipidemia E78.2 simvastatin (ZOCOR) 10 MG tablet     Lipid Profile     Lipid Profile   4. Prediabetes R73.03 glipiZIDE-metFORMIN (METAGLIP) 2.5-500 MG per tablet     metFORMIN (GLUCOPHAGE) 850 MG tablet     blood glucose monitoring (ACCU-CHEK JAMAL PLUS) meter device kit     blood glucose monitoring (NO BRAND SPECIFIED) test strip     Hemoglobin A1c     Hemoglobin A1c   5. Obesity, Class II, BMI 35-39.9, with comorbidity E66.9    6. REKHA on CPAP - uses daily, finds helpful G47.33 SLP COMPREHENSIVE DME    Z99.89    7. Special screening for malignant neoplasm of prostate Z12.5 PSA Screen GH     PSA Screen GH   8. History of type 2 diabetes mellitus Z86.39 glipiZIDE-metFORMIN (METAGLIP) 2.5-500 MG per tablet     metFORMIN (GLUCOPHAGE) 850 MG tablet     blood glucose monitoring (ACCU-CHEK JAMAL  PLUS) meter device kit     blood glucose monitoring (NO BRAND SPECIFIED) test strip   9. Pre-ulcerative calluses L84    10. Pruritic rash L28.2 triamcinolone (KENALOG) 0.1 % ointment   11. Neuropathy G62.9 gabapentin (NEURONTIN) 100 MG capsule     Patient presents for annual physical examination.  Has been trying watch his diet and lose some weight.  Last labs were in the prediabetic range.  Does have some worsening neuropathy symptoms of his feet.  Hemoglobin A1c did increase at this time.  Medications were refilled.  He is currently using Metformin and glipizide.  Only checks his blood sugars on occasion when she does not feel good.  Needs refills.  He is not interested in getting labs more than once a year.    Obesity, discussed need for reduced oral caloric intake.  Regular exercise.  Reduce carbohydrate intake.  Information printed and reviewed.    Hypertension, currently well controlled and tolerating medication.  Needs refills.  Check labs.    Mixed hyperlipidemia.  Currently stable.  Tolerating simvastatin.    Prostate cancer screening.  Check labs    Pruritic rash -patient states she has been having a rash on his legs abdomen and arms for a number of weeks now.  He has tried some Benadryl but only makes him tired at night.  Has not really tried any topical creams or rubs.  He thought initially it potentially could be sand fleas.  Thinks that the rash started on his upper inner elbows before going to his abdomen.  Lower leg rash has been very pruritic.  He is wondering about treatment for this.  Advised trial of topical steroid.  May need a biopsy.  Consider dermatology referral.  He is wondering about antibiotic.  Lesions do almost look follicular in nature.  Could consider Keflex if not improved with above plan.    Neuropathy -having more burning sensation of the lower extremities.  ++ Does have pre-ulcerative calluses on exam today.  --Hemoglobin A1c now in the diabetic range.  Discussed need for reduced  carbohydrate intake.  Start trial of gabapentin.    Obstructive sleep apnea -uses CPAP regularly.  Finds very helpful.  Needs supplies refills.    Functional Capacity: > 4 METS.   Reports that he can climb a flight of stairs without any chest pain/heaviness or shortness of breath.   No orthopnea/paroxysmal nocturnal dyspnea  Review of Systems   Constitutional: Negative for chills, fatigue and fever.   HENT: Negative for congestion and hearing loss.    Eyes: Negative for pain and visual disturbance.   Respiratory: Negative for cough, shortness of breath and wheezing.         + REKHA - uses CPAP nightly.    Cardiovascular: Negative for chest pain and palpitations.   Gastrointestinal: Negative for abdominal pain, diarrhea, nausea and vomiting.   Endocrine: Negative for cold intolerance and heat intolerance.   Genitourinary: Negative for dysuria and hematuria.   Musculoskeletal: Negative for arthralgias and myalgias.   Skin: Positive for rash. Negative for pallor.        + pruritic rash on distal both legs, upper abdomen, inner arm around elbow   Allergic/Immunologic: Negative for immunocompromised state.   Neurological: Negative for dizziness and light-headedness.        + neuropathy pain of feet - even covers hurts.    Hematological: Does not bruise/bleed easily.   Psychiatric/Behavioral: Negative for agitation and confusion.      DEA:   DEA-7 SCORE 8/21/2018   Total Score 0     PHQ9:  PHQ-9 SCORE 8/9/2016 5/8/2017 8/21/2018   Total Score 0 0 0       I have personally reviewed the past medical history, past surgical history, medications, allergies, family and social history as listed below, on 8/21/2018.    Allergies   Allergen Reactions     Liraglutide      Victoza:Other reaction(s): Other - Describe In Comment Field  abd pain     Penicillins Rash     Other reaction(s): Other - Describe In Comment Field  Itchy eyes       Current Outpatient Prescriptions   Medication Sig Dispense Refill     blood glucose monitoring  (ACCU-CHEK JAMAL PLUS) meter device kit Check glucose once daily 1 kit 0     blood glucose monitoring (NO BRAND SPECIFIED) test strip 1 strip by In Vitro route 4 times daily Use to test blood sugar 4 times daily or as directed. 3 Box 3     gabapentin (NEURONTIN) 100 MG capsule Take 1 capsule (100 mg) by mouth 4 times daily as needed - for burning/shooting nerve pain 90 capsule 11     glipiZIDE-metFORMIN (METAGLIP) 2.5-500 MG per tablet Take 1 tablet by mouth 2 times daily (with meals) 180 tablet 3     lisinopril (PRINIVIL/ZESTRIL) 20 MG tablet Take 1 tablet (20 mg) by mouth daily 90 tablet 3     metFORMIN (GLUCOPHAGE) 850 MG tablet Take 1 tablet (850 mg) by mouth 2 times daily (with meals) 180 tablet 3     metoprolol succinate (TOPROL-XL) 50 MG 24 hr tablet Take 1 tablet (50 mg) by mouth daily 90 tablet 3     simvastatin (ZOCOR) 10 MG tablet Take 1 tablet (10 mg) by mouth At Bedtime 90 tablet 3     spironolactone (ALDACTONE) 25 MG tablet Take 1 tablet (25 mg) by mouth daily 90 tablet 3     triamcinolone (KENALOG) 0.1 % ointment Apply sparingly to affected area three times daily for 14 days. 80 g 0     aspirin EC 81 MG EC tablet Take 81 mg by mouth daily with food       Blood Glucose Monitoring Suppl (SURECHEK BLOOD GLUCOSE MONITOR) W/DEVICE KIT Dispense item covered by pt ins. 250.02 NIDDM type II, uncontrolled - Test 4 times/day. Reason: High A1C       lancets 28G MISC Dispense item covered by pt ins. 250.02 NIDDM type II, uncontrolled - Test 4 times/day. Reason: High A1C       Multiple Vitamin (MULTI-VITAMINS) TABS Take 1 tablet by mouth daily       [DISCONTINUED] glipiZIDE-metFORMIN (METAGLIP) 2.5-500 MG per tablet Take 1 tablet by mouth 2 times daily (with meals) 180 tablet 3     [DISCONTINUED] lisinopril (PRINIVIL/ZESTRIL) 20 MG tablet Take 1 tablet (20 mg) by mouth daily 90 tablet 3     [DISCONTINUED] metFORMIN (GLUCOPHAGE) 850 MG tablet Take 1 tablet (850 mg) by mouth 2 times daily (with meals) 180 tablet 3      [DISCONTINUED] metoprolol succinate (TOPROL-XL) 50 MG 24 hr tablet Take 1 tablet (50 mg) by mouth daily 90 tablet 3     [DISCONTINUED] simvastatin (ZOCOR) 10 MG tablet Take 1 tablet (10 mg) by mouth At Bedtime 90 tablet 3     [DISCONTINUED] spironolactone (ALDACTONE) 25 MG tablet Take 1 tablet (25 mg) by mouth daily 90 tablet 3        Patient Active Problem List    Diagnosis Date Noted     Essential hypertension 08/21/2018     Priority: Medium     Special screening for malignant neoplasm of prostate 08/21/2018     Priority: Medium     Pre-ulcerative calluses 08/21/2018     Priority: Medium     Congenital anomalies of foot, 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     Priority: Medium     Overview:   high arch requiring orthotic       Special screening for malignant neoplasms, colon 05/17/2017     Priority: Medium     Mixed hyperlipidemia 05/08/2017     Priority: Medium     S/P shoulder surgery 08/11/2016     Priority: Medium     AC (acromioclavicular) joint arthritis 08/05/2016     Priority: Medium     Impingement syndrome of left shoulder 08/05/2016     Priority: Medium     Tendinitis of left rotator cuff 08/05/2016     Priority: Medium     Partial tear of left subscapularis tendon 08/04/2016     Priority: Medium     Elevated LFTs 09/02/2015     Priority: Medium     Obesity, Class II, BMI 35-39.9, with comorbidity 08/20/2014     Priority: Medium     REKHA on CPAP - uses daily, finds helpful 08/20/2014     Priority: Medium     Plantar fasciitis 04/06/2011     Priority: Medium     Pain in limb 04/05/2011     Priority: Medium     Past Medical History:   Diagnosis Date     Hyperlipidemia     Hyperlipidemia, goal LDL is less than 160     Impingement syndrome of left shoulder     8/5/2016     Lumbar sprain     2004, Low back injury at work, muscle strain, Dr. Howard     Other shoulder lesions, left shoulder     8/5/2016     Other specified postprocedural states     8/11/2016     Personal history of other endocrine,  nutritional and metabolic disease     2004,History of hypokalemia secondary to lisinopril/hydrochlorothiazide combination medication, potassium 2.9, normalized off medication     Primary osteoarthritis of shoulder     8/5/2016     Sprain of other specified parts of left shoulder girdle, initial encounter     8/4/2016     Past Surgical History:   Procedure Laterality Date     ARTHROSCOPY KNEE      1980,Arthroscopic surgery of the left knee x 2, meniscus and lateral release     ARTHROSCOPY KNEE      1981,Lateral release, right knee     COLONOSCOPY      05/18/2017,follow up 2027     OTHER SURGICAL HISTORY      2001,207512,SLEEP STUDY PER PROTOCOL,Sleep study, [facility]     OTHER SURGICAL HISTORY      8/11/2016,[id number],OTHER,Left     Social History     Social History     Marital status:      Spouse name: Mary     Number of children: N/A     Years of education: N/A     Social History Main Topics     Smoking status: Former Smoker     Packs/day: 0.50     Years: 10.00     Types: Cigarettes     Quit date: 1/1/1994     Smokeless tobacco: Current User     Types: Chew      Comment: Quit smoking: tin lasts a week, 30 years     Alcohol use 0.0 oz/week      Comment: Alcoholic Drinks/day: 6 to 12 beers weekly     Drug use: No     Sexual activity: Not Asked     Other Topics Concern     None     Social History Narrative     - Wife Mary, one daughter, lives in [place], [place], 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     Family History   Problem Relation Age of Onset     Hypertension Father      Hypertension     Diabetes Father      Diabetes     HEART DISEASE Father      Heart Disease,pacemaker     Cancer Mother      Cancer,metastatic cancer, type is unknown     Other - See Comments Mother      smoker     HEART DISEASE Mother 58     Heart Disease,stent     Other - See Comments Brother       hemochromatosis     Hypertension Brother      Hypertension     Diabetes Brother      Diabetes      "Hyperlipidemia Brother      Hyperlipidemia,hyperlipidemia     Hypertension Brother      Hypertension     Family History Negative Brother      Good Health     Genetic Disorder Other      Genetic,No Known FHx of colon Cancer     HEART DISEASE Brother 48     Heart Disease,?MI     Anesthesia Reaction No family hx of      Anesthesia Problem     Blood Disease No family hx of      Blood Disease,no bleeding or clotting       EXAM:   Vitals:    08/21/18 0757   BP: 122/74   BP Location: Right arm   Patient Position: Sitting   Cuff Size: Adult Regular   Pulse: 64   Weight: 222 lb 6 oz (100.9 kg)       Current Pain Score: No Pain (0)     BP Readings from Last 3 Encounters:   08/21/18 122/74   05/08/17 130/82   03/26/17 112/80    Wt Readings from Last 3 Encounters:   08/21/18 222 lb 6 oz (100.9 kg)   05/08/17 235 lb 2 oz (106.7 kg)   03/26/17 228 lb (103.4 kg)      Estimated body mass index is 33.81 kg/(m^2) as calculated from the following:    Height as of 5/8/17: 5' 8\" (1.727 m).    Weight as of this encounter: 222 lb 6 oz (100.9 kg).     Physical Exam   Constitutional: He appears well-developed and well-nourished. No distress.   HENT:   Head: Normocephalic and atraumatic.   Eyes: Conjunctivae are normal. No scleral icterus.   Neck: No thyromegaly present.   Cardiovascular: Normal rate and regular rhythm.    No carotid Bruits   Pulmonary/Chest: Effort normal. No respiratory distress. He has no wheezes.   Abdominal: Soft. There is no tenderness.   Musculoskeletal: He exhibits no tenderness or deformity.   Lymphadenopathy:     He has no cervical adenopathy.   Neurological: He is alert. No cranial nerve deficit.   Skin: Skin is warm and dry. Rash noted.   Some excoriations on inner arms, lower legs, upper abdomen. Mild erythema. Small almost follicular appearing. Isolated lesions.     + Pre-ulcerative calluses noted on first MTP joints bilateral feet.  Has mildly reduced light touch sensation of the feet.  Normal intact DP and PT " pulses.   Psychiatric: He has a normal mood and affect.      INVESTIGATIONS:  Results for orders placed or performed in visit on 08/21/18   CBC with platelets   Result Value Ref Range    WBC 5.4 4.0 - 11.0 10e9/L    RBC Count 4.67 4.4 - 5.9 10e12/L    Hemoglobin 14.6 13.3 - 17.7 g/dL    Hematocrit 42.8 40.0 - 53.0 %    MCV 92 78 - 100 fl    MCH 31.3 26.5 - 33.0 pg    MCHC 34.1 31.5 - 36.5 g/dL    RDW 12.6 10.0 - 15.0 %    Platelet Count 265 150 - 450 10e9/L   Comprehensive metabolic panel   Result Value Ref Range    Sodium 139 134 - 144 mmol/L    Potassium 4.2 3.5 - 5.1 mmol/L    Chloride 104 98 - 107 mmol/L    Carbon Dioxide 27 21 - 31 mmol/L    Anion Gap 8 3 - 14 mmol/L    Glucose 124 (H) 70 - 105 mg/dL    Urea Nitrogen 13 7 - 25 mg/dL    Creatinine 0.91 0.70 - 1.30 mg/dL    GFR Estimate 88 >60 mL/min/1.7m2    GFR Estimate If Black >90 >60 mL/min/1.7m2    Calcium 9.8 8.6 - 10.3 mg/dL    Bilirubin Total 0.5 0.3 - 1.0 mg/dL    Albumin 4.4 3.5 - 5.7 g/dL    Protein Total 7.2 6.4 - 8.9 g/dL    Alkaline Phosphatase 44 34 - 104 U/L    ALT 25 7 - 52 U/L    AST 22 13 - 39 U/L   Hemoglobin A1c   Result Value Ref Range    Hemoglobin A1C 6.7 (H) 4.0 - 6.0 %   Lipid Profile   Result Value Ref Range    Cholesterol 142 <200 mg/dL    Triglycerides 151 (H) <150 mg/dL    HDL Cholesterol 45 23 - 92 mg/dL    LDL Cholesterol Calculated 67 <100 mg/dL    Non HDL Cholesterol 97 <130 mg/dL   PSA Screen GH   Result Value Ref Range    PSA Screen 0.316 <3.100 ng/mL       ASSESSMENT AND PLAN:  Problem List Items Addressed This Visit        Respiratory    REKHA on CPAP - uses daily, finds helpful    Relevant Orders    SLP COMPREHENSIVE DME (Completed)       Digestive    Obesity, Class II, BMI 35-39.9, with comorbidity    Relevant Medications    glipiZIDE-metFORMIN (METAGLIP) 2.5-500 MG per tablet    metFORMIN (GLUCOPHAGE) 850 MG tablet       Endocrine    Mixed hyperlipidemia    Relevant Medications    simvastatin (ZOCOR) 10 MG tablet    Other  Relevant Orders    Lipid Profile       Circulatory    Essential hypertension    Relevant Medications    lisinopril (PRINIVIL/ZESTRIL) 20 MG tablet    metoprolol succinate (TOPROL-XL) 50 MG 24 hr tablet    spironolactone (ALDACTONE) 25 MG tablet    Other Relevant Orders    CBC with platelets    Comprehensive metabolic panel    *UA reflex to Microscopic       Musculoskeletal and Integumentary    Pre-ulcerative calluses    Relevant Medications    triamcinolone (KENALOG) 0.1 % ointment       Other    Special screening for malignant neoplasm of prostate    Relevant Orders    PSA Screen GH (Completed)      Other Visit Diagnoses     Routine general medical examination at a health care facility    -  Primary    Prediabetes        Relevant Medications    glipiZIDE-metFORMIN (METAGLIP) 2.5-500 MG per tablet    metFORMIN (GLUCOPHAGE) 850 MG tablet    blood glucose monitoring (ACCU-CHEK JAMAL PLUS) meter device kit    blood glucose monitoring (NO BRAND SPECIFIED) test strip    Other Relevant Orders    Hemoglobin A1c    History of type 2 diabetes mellitus        Relevant Medications    glipiZIDE-metFORMIN (METAGLIP) 2.5-500 MG per tablet    metFORMIN (GLUCOPHAGE) 850 MG tablet    blood glucose monitoring (ACCU-CHEK JAMAL PLUS) meter device kit    blood glucose monitoring (NO BRAND SPECIFIED) test strip    Pruritic rash        Relevant Medications    triamcinolone (KENALOG) 0.1 % ointment    Neuropathy        Relevant Medications    gabapentin (NEURONTIN) 100 MG capsule        reviewed diet, exercise and weight control, recommended sodium restriction, cardiovascular risk and specific lipid/LDL goals reviewed, specific diabetic recommendations low cholesterol diet, weight control and daily exercise discussed and home glucose monitoring taught, technique demonstrated  -- Expected clinical course discussed    -- Medications and their side effects discussed    The 10-year ASCVD risk score (Lisa LAI Jr, et al., 2013) is: 6.1%    Values  used to calculate the score:      Age: 52 years      Sex: Male      Is Non- : No      Diabetic: Yes      Tobacco smoker: No      Systolic Blood Pressure: 122 mmHg      Is BP treated: Yes      HDL Cholesterol: 45 mg/dL      Total Cholesterol: 142 mg/dL    Patient Instructions   1. Routine general medical examination at a health care facility    2. Essential hypertension  - lisinopril (PRINIVIL/ZESTRIL) 20 MG tablet; Take 1 tablet (20 mg) by mouth daily  Dispense: 90 tablet; Refill: 3  - metoprolol succinate (TOPROL-XL) 50 MG 24 hr tablet; Take 1 tablet (50 mg) by mouth daily  Dispense: 90 tablet; Refill: 3  - spironolactone (ALDACTONE) 25 MG tablet; Take 1 tablet (25 mg) by mouth daily  Dispense: 90 tablet; Refill: 3  - CBC with platelets; Standing  - Comprehensive metabolic panel; Standing  - *UA reflex to Microscopic; Standing    3. Mixed hyperlipidemia  - simvastatin (ZOCOR) 10 MG tablet; Take 1 tablet (10 mg) by mouth At Bedtime  Dispense: 90 tablet; Refill: 3  - Lipid Profile; Standing    4. Prediabetes  - glipiZIDE-metFORMIN (METAGLIP) 2.5-500 MG per tablet; Take 1 tablet by mouth 2 times daily (with meals)  Dispense: 180 tablet; Refill: 3  - metFORMIN (GLUCOPHAGE) 850 MG tablet; Take 1 tablet (850 mg) by mouth 2 times daily (with meals)  Dispense: 180 tablet; Refill: 3  - blood glucose monitoring (ACCU-CHEK JAMAL PLUS) meter device kit; Check glucose once daily  Dispense: 1 kit; Refill: 0  - blood glucose monitoring (NO BRAND SPECIFIED) test strip; 1 strip by In Vitro route 4 times daily Use to test blood sugar 4 times daily or as directed.  Dispense: 3 Box; Refill: 3  - Hemoglobin A1c; Standing    To help with weight loss and improve blood sugar control....    -- Try to avoid Carbohydrates as much as possible -- breads, pasta, baked goods, cakes, oatmeal, cold cereal, potatoes.   These are turned to sugar in one metabolic conversion, cause insulin secretion and increased fat deposition  / weight gain.      -- Eat more lean meats, proteins, eggs, nuts.      5. Obesity, Class II, BMI 35-39.9, with comorbidity    6. REKHA on CPAP - uses daily, finds helpful  - SLP COMPREHENSIVE DME    7. Special screening for malignant neoplasm of prostate  - PSA Screen GH; Future    8. History of type 2 diabetes mellitus  - glipiZIDE-metFORMIN (METAGLIP) 2.5-500 MG per tablet; Take 1 tablet by mouth 2 times daily (with meals)  Dispense: 180 tablet; Refill: 3  - metFORMIN (GLUCOPHAGE) 850 MG tablet; Take 1 tablet (850 mg) by mouth 2 times daily (with meals)  Dispense: 180 tablet; Refill: 3  - blood glucose monitoring (ACCU-CHEK JAMAL PLUS) meter device kit; Check glucose once daily  Dispense: 1 kit; Refill: 0  - blood glucose monitoring (NO BRAND SPECIFIED) test strip; 1 strip by In Vitro route 4 times daily Use to test blood sugar 4 times daily or as directed.  Dispense: 3 Box; Refill: 3    9. Pre-ulcerative calluses    10. Pruritic rash    START:   - triamcinolone (KENALOG) 0.1 % ointment; Apply sparingly to affected area three times daily for 14 days.  Dispense: 80 g; Refill: 0      + Neuropathy pain.   START:   Neurontin (Gabapentin) 100 mg capsule - take up to 4 times daily as needed for burning/shooting nerve pain.   - take 1 capsule tonight, then 1 capsule twice daily tomorrow, then 1 capsule 3 times daily the following day -- if needed can increase up to 4 times daily.   - continue 3 to 4 capsules daily thereafter...   - if they cause too much sleepiness during the day - okay to use all 3 or 4 at bedtime instead.      Call in 1-2 weeks if rash not better -- would start Keflex at that time. (oral antibiotic).   -- consider dermatology consultation.     Return in approximately 1 year, or sooner as needed for follow-up with Dr. Celeste.    Clinic : [phone]  Appointment line: [phone]      Boo Celeste MD  Internal Medicine  [facility]

## 2018-08-22 ASSESSMENT — PATIENT HEALTH QUESTIONNAIRE - PHQ9: SUM OF ALL RESPONSES TO PHQ QUESTIONS 1-9: 0

## 2018-08-22 ASSESSMENT — ANXIETY QUESTIONNAIRES: GAD7 TOTAL SCORE: 0

## 2019-01-17 DIAGNOSIS — R73.03 PREDIABETES: Primary | ICD-10-CM

## 2019-01-17 RX ORDER — GLUCOSAMINE HCL/CHONDROITIN SU 500-400 MG
CAPSULE ORAL
Qty: 500 EACH | Refills: 3 | Status: SHIPPED | OUTPATIENT
Start: 2019-01-17

## 2019-01-17 RX ORDER — LANCETS
EACH MISCELLANEOUS
Qty: 500 EACH | Refills: 3 | Status: SHIPPED | OUTPATIENT
Start: 2019-01-17 | End: 2020-09-17

## 2019-01-17 NOTE — TELEPHONE ENCOUNTER
Prescription [place] per [facility] [place].  Per pharmacy new orders needed as insurance won't cover supplies filled last year. Antoinette Lynch RN on 1/17/2019 at 4:06 PM

## 2019-02-07 ENCOUNTER — TRANSFERRED RECORDS (OUTPATIENT)
Dept: HEALTH INFORMATION MANAGEMENT | Facility: OTHER | Age: 54
End: 2019-02-07

## 2019-02-28 ENCOUNTER — HOSPITAL ENCOUNTER (OUTPATIENT)
Dept: GENERAL RADIOLOGY | Facility: OTHER | Age: 54
Discharge: HOME | End: 2019-02-28
Attending: NURSE PRACTITIONER | Admitting: NURSE PRACTITIONER
Payer: COMMERCIAL

## 2019-02-28 ENCOUNTER — OFFICE VISIT (OUTPATIENT)
Dept: FAMILY MEDICINE | Facility: OTHER | Age: 54
End: 2019-02-28
Attending: NURSE PRACTITIONER
Payer: COMMERCIAL

## 2019-02-28 VITALS
DIASTOLIC BLOOD PRESSURE: 76 MMHG | HEART RATE: 78 BPM | OXYGEN SATURATION: 95 % | BODY MASS INDEX: 34.23 KG/M2 | WEIGHT: 225.1 LBS | TEMPERATURE: 96.1 F | SYSTOLIC BLOOD PRESSURE: 130 MMHG | RESPIRATION RATE: 16 BRPM

## 2019-02-28 DIAGNOSIS — J20.8 ACUTE VIRAL BRONCHITIS: ICD-10-CM

## 2019-02-28 DIAGNOSIS — J06.9 URI WITH COUGH AND CONGESTION: Primary | ICD-10-CM

## 2019-02-28 LAB
BASOPHILS # BLD AUTO: 0.1 10E9/L (ref 0–0.2)
BASOPHILS NFR BLD AUTO: 0.5 %
DIFFERENTIAL METHOD BLD: NORMAL
EOSINOPHIL # BLD AUTO: 0.2 10E9/L (ref 0–0.7)
EOSINOPHIL NFR BLD AUTO: 1.5 %
ERYTHROCYTE [DISTWIDTH] IN BLOOD BY AUTOMATED COUNT: 11.7 % (ref 10–15)
HCT VFR BLD AUTO: 42.9 % (ref 40–53)
HGB BLD-MCNC: 14.6 G/DL (ref 13.3–17.7)
IMM GRANULOCYTES # BLD: 0 10E9/L (ref 0–0.4)
IMM GRANULOCYTES NFR BLD: 0.3 %
LYMPHOCYTES # BLD AUTO: 2.8 10E9/L (ref 0.8–5.3)
LYMPHOCYTES NFR BLD AUTO: 26.2 %
MCH RBC QN AUTO: 30.2 PG (ref 26.5–33)
MCHC RBC AUTO-ENTMCNC: 34 G/DL (ref 31.5–36.5)
MCV RBC AUTO: 89 FL (ref 78–100)
MONOCYTES # BLD AUTO: 0.5 10E9/L (ref 0–1.3)
MONOCYTES NFR BLD AUTO: 4.8 %
NEUTROPHILS # BLD AUTO: 7.2 10E9/L (ref 1.6–8.3)
NEUTROPHILS NFR BLD AUTO: 66.7 %
PLATELET # BLD AUTO: 334 10E9/L (ref 150–450)
RBC # BLD AUTO: 4.83 10E12/L (ref 4.4–5.9)
WBC # BLD AUTO: 10.7 10E9/L (ref 4–11)

## 2019-02-28 PROCEDURE — 99214 OFFICE O/P EST MOD 30 MIN: CPT | Performed by: NURSE PRACTITIONER

## 2019-02-28 PROCEDURE — 85025 COMPLETE CBC W/AUTO DIFF WBC: CPT | Performed by: NURSE PRACTITIONER

## 2019-02-28 PROCEDURE — 71046 X-RAY EXAM CHEST 2 VIEWS: CPT

## 2019-02-28 PROCEDURE — 36415 COLL VENOUS BLD VENIPUNCTURE: CPT | Performed by: NURSE PRACTITIONER

## 2019-02-28 RX ORDER — CODEINE PHOSPHATE AND GUAIFENESIN 10; 100 MG/5ML; MG/5ML
2 SOLUTION ORAL EVERY 6 HOURS PRN
Qty: 120 ML | Refills: 0 | Status: SHIPPED | OUTPATIENT
Start: 2019-02-28 | End: 2019-04-15

## 2019-02-28 RX ORDER — BENZONATATE 200 MG/1
200 CAPSULE ORAL 3 TIMES DAILY PRN
Qty: 21 CAPSULE | Refills: 0 | Status: SHIPPED | OUTPATIENT
Start: 2019-02-28 | End: 2019-04-15

## 2019-02-28 ASSESSMENT — PAIN SCALES - GENERAL: PAINLEVEL: NO PAIN (0)

## 2019-02-28 NOTE — PATIENT INSTRUCTIONS
Patient Education     Viral Bronchitis (Adult)  You have a viral bronchitis. Bronchitis is inflammation and swelling of the lining of the lungs. This is often caused by an infection. Symptoms include a dry, hacking cough that is worse at night. The cough may bring up yellow-green mucus. You may also feel short of breath or wheeze. Other symptoms may include tiredness, chest discomfort, and chills.  Bronchitis that is caused by a virus is not treated with antibiotics. Instead, medicines may be given to help relieve symptoms. Symptoms can last up to 2 weeks, although the cough may last much longer.  This illness is contagious during the first few days and is spread through the air by coughing and sneezing, or by direct contact (touching the sick person and then touching your own eyes, nose, or mouth).  Most viral illnesses resolve within 10 to 14 days with rest and simple home remedies, although they may sometimes last for several weeks.  Home care    If symptoms are severe, rest at home for the first 2 to 3 days. When you go back to your usual activities, don't let yourself get too tired.    Do not smoke. Also avoid being exposed to secondhand smoke.    You may use over-the-counter medicine to control fever or pain, unless another pain medicine was prescribed. If you have chronic liver or kidney disease or have ever had a stomach ulcer or gastrointestinal bleeding, talk with your healthcare provider before using these medicines. Also talk to your provider if you are taking medicine to prevent blood clots. Aspirin should never be given to anyone younger than 18 years of age who is ill with a viral infection or fever. It may cause severe liver or brain damage.    Your appetite may be poor, so a light diet is fine. Avoid dehydration by drinking 6 to 8 glasses of fluids per day (such as water, soft drinks, sports drinks, juices, tea, or soup). Extra fluids will help loosen secretions in the nose and  lungs.    Over-the-counter cough, cold, and sore-throat medicines will not shorten the length of the illness, but they may help to reduce symptoms. Don't use decongestants if you have high blood pressure.  Follow-up care  Follow up with your healthcare provider, or as advised. If you had an X-ray or ECG (electrocardiogram), a specialist will review it. You will be notified of any new findings that may affect your care.  If you are age 65 or older, or if you have a chronic lung disease or condition that affects your immune system, or you smoke, ask your healthcare provider about getting a pneumococcal vaccine and a yearly flu shot (influenza vaccine).  When to seek medical advice  Call your healthcare provider right away if any of these occur:    Fever of 100.4 F (38 C) or higher, or as directed by your healthcare provider    Coughing up increased amounts of colored sputum    Weakness, drowsiness, headache, facial pain, ear pain, or a stiff neck  Call 911  Call 911 if any of these occur:    Coughing up blood    Worsening weakness, drowsiness, headache, or stiff neck    Trouble breathing, wheezing, or pain with breathing

## 2019-02-28 NOTE — PROGRESS NOTES
Nursing Notes:   Virgen De LPN  2/28/2019  3:00 PM  Sign at exiting of workspace  Chief Complaint   Patient presents with     Cough       Medication Reconciliation: complete   Patient presents with cough productive green, runny nose, hot/cold sore throat for 1 week. Patient states sore throat has diminished. Virgen De LPN .............2/28/2019  2:57 PM      SUBJECTIVE:   Willis Oliveira is a 53 year old male who presents to clinic today for the following health issues:    RESPIRATORY SYMPTOMS      Duration: 1 week    Description  nasal congestion, rhinorrhea, cough and hot cold but did not take temperature. Cough is hacking and non productive.      Severity: severe    Accompanying signs and symptoms: He is eating and drinking well. C/O lots of mucous.     History (predisposing factors):  No hx of asthma or COPD    Precipitating or alleviating factors: None    Therapies tried and outcome:  rest and fluids guaifenesin    He did get a flu shot this year.     Problem list and histories reviewed & adjusted, as indicated.  Additional history: as documented    Past Medical History:   Diagnosis Date     Hyperlipidemia     Hyperlipidemia, goal LDL is less than 160     Impingement syndrome of left shoulder     8/5/2016     Lumbar sprain     2004, Low back injury at work, muscle strain, Dr. Howard     Other shoulder lesions, left shoulder     8/5/2016     Other specified postprocedural states     8/11/2016     Personal history of other endocrine, nutritional and metabolic disease     2004,History of hypokalemia secondary to lisinopril/hydrochlorothiazide combination medication, potassium 2.9, normalized off medication     Primary osteoarthritis of shoulder     8/5/2016     Sprain of other specified parts of left shoulder girdle, initial encounter     8/4/2016       Current Outpatient Medications   Medication Sig Dispense Refill     aspirin EC 81 MG EC tablet Take 81 mg by mouth daily with food        glipiZIDE-metFORMIN (METAGLIP) 2.5-500 MG per tablet Take 1 tablet by mouth 2 times daily (with meals) 180 tablet 3     lisinopril (PRINIVIL/ZESTRIL) 20 MG tablet Take 1 tablet (20 mg) by mouth daily 90 tablet 3     metFORMIN (GLUCOPHAGE) 850 MG tablet Take 1 tablet (850 mg) by mouth 2 times daily (with meals) 180 tablet 3     metoprolol succinate (TOPROL-XL) 50 MG 24 hr tablet Take 1 tablet (50 mg) by mouth daily 90 tablet 3     Multiple Vitamin (MULTI-VITAMINS) TABS Take 1 tablet by mouth daily       simvastatin (ZOCOR) 10 MG tablet Take 1 tablet (10 mg) by mouth At Bedtime 90 tablet 3     spironolactone (ALDACTONE) 25 MG tablet Take 1 tablet (25 mg) by mouth daily 90 tablet 3     alcohol swab prep pads Use to swab area of injection/bebo as directed. 500 each 3     blood glucose (NO BRAND SPECIFIED) test strip Use to test blood sugar four times daily or as directed. Dispense as covered by patient's insurance. 500 strip 3     blood glucose calibration (NO BRAND SPECIFIED) solution To accompany: Blood Glucose Monitor Brands: ACCU-CHEK meter 1 Bottle 3     blood glucose monitoring (ACCU-CHEK JAMAL PLUS) meter device kit Check glucose once daily 1 kit 0     blood glucose monitoring (NO BRAND SPECIFIED) meter device kit Use to test blood sugar four times daily or as directed. Dispense as covered by patient's insurance. 1 kit 0     blood glucose monitoring (NO BRAND SPECIFIED) test strip 1 strip by In Vitro route 4 times daily Use to test blood sugar 4 times daily or as directed. 3 Box 3     Blood Glucose Monitoring Suppl (SURECHEK BLOOD GLUCOSE MONITOR) W/DEVICE KIT Dispense item covered by pt ins. 250.02 NIDDM type II, uncontrolled - Test 4 times/day. Reason: High A1C       lancets 28G MISC Dispense item covered by pt ins. 250.02 NIDDM type II, uncontrolled - Test 4 times/day. Reason: High A1C       thin (NO BRAND SPECIFIED) lancets Use with lanceting device to test 4 times daily. Dispense as covered by patient's  insurance. 500 each 3     Allergies   Allergen Reactions     Liraglutide      Victoza:Other reaction(s): Other - Describe In Comment Field  abd pain     Penicillins Rash     Other reaction(s): Other - Describe In Comment Field  Itchy eyes         ROS:  Notable findings in the HPI.       OBJECTIVE:     /76 (BP Location: Left arm, Patient Position: Sitting, Cuff Size: Adult Regular)   Pulse 78   Temp 96.1  F (35.6  C) (Tympanic)   Resp 16   Wt 102.1 kg (225 lb 1.6 oz)   SpO2 95%   BMI 34.23 kg/m    Body mass index is 34.23 kg/m .  GENERAL: healthy, alert and no distress  EYES: Eyes grossly normal to inspection  HENT: normal cephalic/atraumatic, right ear: normal: no effusions, no erythema, normal landmarks, left ear: normal: no effusions, no erythema, normal landmarks, nose and mouth without ulcers or lesions, nasal mucosa edematous , oropharynx clear, oral mucous membranes moist and sinuses: not tender  NECK: no adenopathy  RESP: lungs clear to auscultation - no rales, rhonchi or wheezes and dry cough noted  CV: regular rates and rhythm, normal S1 S2, no S3 or S4, no murmur, click or rub, peripheral pulses strong and no peripheral edema  SKIN: no suspicious lesions or rashes  NEURO: Normal strength and tone, mentation intact and speech normal  PSYCH: mentation appears normal, affect normal/bright    Diagnostic Test Results:  Results for orders placed or performed in visit on 02/28/19 (from the past 24 hour(s))   CBC with platelets differential   Result Value Ref Range    WBC 10.7 4.0 - 11.0 10e9/L    RBC Count 4.83 4.4 - 5.9 10e12/L    Hemoglobin 14.6 13.3 - 17.7 g/dL    Hematocrit 42.9 40.0 - 53.0 %    MCV 89 78 - 100 fl    MCH 30.2 26.5 - 33.0 pg    MCHC 34.0 31.5 - 36.5 g/dL    RDW 11.7 10.0 - 15.0 %    Platelet Count 334 150 - 450 10e9/L    Diff Method Automated Method     % Neutrophils 66.7 %    % Lymphocytes 26.2 %    % Monocytes 4.8 %    % Eosinophils 1.5 %    % Basophils 0.5 %    % Immature  Granulocytes 0.3 %    Absolute Neutrophil 7.2 1.6 - 8.3 10e9/L    Absolute Lymphocytes 2.8 0.8 - 5.3 10e9/L    Absolute Monocytes 0.5 0.0 - 1.3 10e9/L    Absolute Eosinophils 0.2 0.0 - 0.7 10e9/L    Absolute Basophils 0.1 0.0 - 0.2 10e9/L    Abs Immature Granulocytes 0.0 0 - 0.4 10e9/L   XR Chest 2 Views    Narrative    PROCEDURE:  XR CHEST 2 VW    HISTORY: cough and congestion; URI with cough and congestion, .    COMPARISON:  3/18/2015    FINDINGS:  The cardiomediastinal contours are normal.  The trachea is midline.  No focal consolidation, effusion or pneumothorax.    No suspicious osseous lesion or subdiaphragmatic free air.      Impression    IMPRESSION:      No focal consolidation. Stable chest.    HERMINIO LEVI MD     Completed Chest xray.  I personally reviewed the xray. There was no acute disease upon initial read of xray.  Final read pending by radiology.    ASSESSMENT/PLAN:     1. URI with cough and congestion  - CBC with platelets differential  - XR Chest 2 Views    2. Acute viral bronchitis  - benzonatate (TESSALON) 200 MG capsule; Take 1 capsule (200 mg) by mouth 3 times daily as needed for cough  Dispense: 21 capsule; Refill: 0  - guaiFENesin-codeine (ROBITUSSIN AC) 100-10 MG/5ML solution; Take 10 mLs by mouth every 6 hours as needed for cough  Dispense: 120 mL; Refill: 0    PLAN:    URI Adult:  Tylenol, Ibuprofen, Fluids, Rest, OTC cough suppressant/expectorant, OTC decongestant/antihistamine, Vaporizer and discussed the viral nature of his illness.  He is coughing however it is still likely viral.  He states that it is artery been a week that he has been coughing and he does not want a cough anymore.  Did discuss with him length of illness, expectations, and recovery.  Will use symptomatic management including Tessalon and Robitussin with codeine.  He will follow-up with primary if he is not getting better in the next 10 days.    Followup:    If not improving or if condition worsens, follow up  with your Primary Care Provider    I explained my diagnostic considerations and recommendations to the patient, who voiced understanding and agreement with the treatment plan. All questions were answered. We discussed potential side effects of any prescribed or recommended therapies, as well as expectations for response to treatments. He was advised to contact our office if there is no improvement or worsening of conditions or symptoms.  If s/s worsen or persist, patient will either come back or follow up with PCP.    Disclaimer:  This note consists of words and symbols derived from keyboarding, dictation, or using voice recognition software. As a result, there may be errors in the script that have gone undetected. Please consider this when interpreting information found in this note.      Tanna Monroy NP, 2/28/2019 3:05 PM

## 2019-02-28 NOTE — NURSING NOTE
Chief Complaint   Patient presents with     Cough       Medication Reconciliation: complete   Patient presents with cough productive green, runny nose, hot/cold sore throat for 1 week. Patient states sore throat has diminished. Virgen De LPN .............2/28/2019  2:57 PM

## 2019-04-15 ENCOUNTER — HOSPITAL ENCOUNTER (EMERGENCY)
Facility: OTHER | Age: 54
Discharge: HOME | End: 2019-04-15
Attending: FAMILY MEDICINE | Admitting: FAMILY MEDICINE
Payer: COMMERCIAL

## 2019-04-15 ENCOUNTER — APPOINTMENT (OUTPATIENT)
Dept: GENERAL RADIOLOGY | Facility: OTHER | Age: 54
End: 2019-04-15
Attending: FAMILY MEDICINE
Payer: COMMERCIAL

## 2019-04-15 VITALS
SYSTOLIC BLOOD PRESSURE: 115 MMHG | TEMPERATURE: 99.3 F | BODY MASS INDEX: 33.34 KG/M2 | HEIGHT: 68 IN | DIASTOLIC BLOOD PRESSURE: 74 MMHG | RESPIRATION RATE: 7 BRPM | HEART RATE: 74 BPM | OXYGEN SATURATION: 96 % | WEIGHT: 220 LBS

## 2019-04-15 DIAGNOSIS — J20.9 ACUTE BRONCHITIS, UNSPECIFIED ORGANISM: ICD-10-CM

## 2019-04-15 LAB
ALBUMIN SERPL-MCNC: 4.4 G/DL (ref 3.5–5.7)
ALP SERPL-CCNC: 51 U/L (ref 34–104)
ALT SERPL W P-5'-P-CCNC: 15 U/L (ref 7–52)
ANION GAP SERPL CALCULATED.3IONS-SCNC: 13 MMOL/L (ref 3–14)
AST SERPL W P-5'-P-CCNC: 16 U/L (ref 13–39)
BASOPHILS # BLD AUTO: 0.1 10E9/L (ref 0–0.2)
BASOPHILS NFR BLD AUTO: 0.4 %
BILIRUB SERPL-MCNC: 0.3 MG/DL (ref 0.3–1)
BUN SERPL-MCNC: 15 MG/DL (ref 7–25)
CALCIUM SERPL-MCNC: 9.9 MG/DL (ref 8.6–10.3)
CHLORIDE SERPL-SCNC: 104 MMOL/L (ref 98–107)
CO2 SERPL-SCNC: 21 MMOL/L (ref 21–31)
CREAT SERPL-MCNC: 1.05 MG/DL (ref 0.7–1.3)
DIFFERENTIAL METHOD BLD: ABNORMAL
EOSINOPHIL # BLD AUTO: 0.3 10E9/L (ref 0–0.7)
EOSINOPHIL NFR BLD AUTO: 2.9 %
ERYTHROCYTE [DISTWIDTH] IN BLOOD BY AUTOMATED COUNT: 12 % (ref 10–15)
GFR SERPL CREATININE-BSD FRML MDRD: 74 ML/MIN/{1.73_M2}
GLUCOSE SERPL-MCNC: 171 MG/DL (ref 70–105)
HCT VFR BLD AUTO: 43.5 % (ref 40–53)
HGB BLD-MCNC: 14.8 G/DL (ref 13.3–17.7)
IMM GRANULOCYTES # BLD: 0 10E9/L (ref 0–0.4)
IMM GRANULOCYTES NFR BLD: 0.3 %
LYMPHOCYTES # BLD AUTO: 2.8 10E9/L (ref 0.8–5.3)
LYMPHOCYTES NFR BLD AUTO: 25 %
MCH RBC QN AUTO: 30.4 PG (ref 26.5–33)
MCHC RBC AUTO-ENTMCNC: 34 G/DL (ref 31.5–36.5)
MCV RBC AUTO: 89 FL (ref 78–100)
MONOCYTES # BLD AUTO: 1 10E9/L (ref 0–1.3)
MONOCYTES NFR BLD AUTO: 8.5 %
NEUTROPHILS # BLD AUTO: 7.1 10E9/L (ref 1.6–8.3)
NEUTROPHILS NFR BLD AUTO: 62.9 %
PLATELET # BLD AUTO: 277 10E9/L (ref 150–450)
POTASSIUM SERPL-SCNC: 4.1 MMOL/L (ref 3.5–5.1)
PROT SERPL-MCNC: 7.1 G/DL (ref 6.4–8.9)
RBC # BLD AUTO: 4.87 10E12/L (ref 4.4–5.9)
SODIUM SERPL-SCNC: 138 MMOL/L (ref 134–144)
TROPONIN I SERPL-MCNC: <0.03 UG/L (ref 0–0.03)
WBC # BLD AUTO: 11.3 10E9/L (ref 4–11)

## 2019-04-15 PROCEDURE — 36415 COLL VENOUS BLD VENIPUNCTURE: CPT | Performed by: FAMILY MEDICINE

## 2019-04-15 PROCEDURE — 40000275 ZZH STATISTIC RCP TIME EA 10 MIN

## 2019-04-15 PROCEDURE — 85025 COMPLETE CBC W/AUTO DIFF WBC: CPT | Performed by: FAMILY MEDICINE

## 2019-04-15 PROCEDURE — 25000125 ZZHC RX 250: Performed by: FAMILY MEDICINE

## 2019-04-15 PROCEDURE — 80053 COMPREHEN METABOLIC PANEL: CPT | Performed by: FAMILY MEDICINE

## 2019-04-15 PROCEDURE — 94640 AIRWAY INHALATION TREATMENT: CPT

## 2019-04-15 PROCEDURE — 99285 EMERGENCY DEPT VISIT HI MDM: CPT | Mod: 25 | Performed by: FAMILY MEDICINE

## 2019-04-15 PROCEDURE — 71046 X-RAY EXAM CHEST 2 VIEWS: CPT

## 2019-04-15 PROCEDURE — 99282 EMERGENCY DEPT VISIT SF MDM: CPT | Mod: Z6 | Performed by: FAMILY MEDICINE

## 2019-04-15 PROCEDURE — 84484 ASSAY OF TROPONIN QUANT: CPT | Performed by: FAMILY MEDICINE

## 2019-04-15 PROCEDURE — 93010 ELECTROCARDIOGRAM REPORT: CPT | Performed by: INTERNAL MEDICINE

## 2019-04-15 PROCEDURE — 93005 ELECTROCARDIOGRAM TRACING: CPT | Performed by: FAMILY MEDICINE

## 2019-04-15 RX ORDER — ALBUTEROL SULFATE 90 UG/1
2 AEROSOL, METERED RESPIRATORY (INHALATION) EVERY 6 HOURS PRN
Qty: 18 G | Refills: 0 | Status: SHIPPED | OUTPATIENT
Start: 2019-04-15 | End: 2020-09-17

## 2019-04-15 RX ORDER — IPRATROPIUM BROMIDE AND ALBUTEROL SULFATE 2.5; .5 MG/3ML; MG/3ML
3 SOLUTION RESPIRATORY (INHALATION) ONCE
Status: COMPLETED | OUTPATIENT
Start: 2019-04-15 | End: 2019-04-15

## 2019-04-15 RX ADMIN — IPRATROPIUM BROMIDE AND ALBUTEROL SULFATE 3 ML: .5; 3 SOLUTION RESPIRATORY (INHALATION) at 07:30

## 2019-04-15 ASSESSMENT — ENCOUNTER SYMPTOMS
WHEEZING: 1
NAUSEA: 0
UNEXPECTED WEIGHT CHANGE: 0
COUGH: 1
CONSTIPATION: 0
DYSURIA: 0
ARTHRALGIAS: 0
CHILLS: 1
DIARRHEA: 0
FREQUENCY: 0
VOMITING: 0
ACTIVITY CHANGE: 0
SHORTNESS OF BREATH: 1
FATIGUE: 1
SORE THROAT: 1
FEVER: 1
APPETITE CHANGE: 0
MYALGIAS: 0
PALPITATIONS: 0
DIAPHORESIS: 1
HEADACHES: 0
HEMATURIA: 0
ABDOMINAL DISTENTION: 0
CHEST TIGHTNESS: 1

## 2019-04-15 ASSESSMENT — MIFFLIN-ST. JEOR: SCORE: 1817.41

## 2019-04-15 NOTE — ED PROVIDER NOTES
History     Chief Complaint   Patient presents with     Shortness of Breath     HPI  Willis Oliveira is a 53 year old male with DMII and HTN who presents to the ED with concerns of SOB. His illness dates back to 04/12 when he had a sore throat. This progressed into a cough 04/13 that worsened until today. He describes his cough as non-productive with the exception of one episode yesterday where he produced something red but he was uncertain if it was blood, and production in the mornings. He sleeps with a BiPAP at night that he says he cleans every other day.  It was accompanied by subjective fevers/chill and night sweats. Willis received his flu vaccine this year, denies recent travel or sick contacts. He has been sick a few times recently but says it was never this difficult to breath, especially on exhale. He endorses seasonal allergies for which he takes Flonase and over the counter antihistamines that usually provide relief.     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     Priority: Medium     Overview:   high arch requiring orthotic       Special screening for malignant neoplasms, colon 05/17/2017     Priority: Medium     Mixed hyperlipidemia 05/08/2017     Priority: Medium     S/P shoulder surgery 08/11/2016     Priority: Medium     AC (acromioclavicular) joint arthritis 08/05/2016     Priority: Medium     Impingement syndrome of left shoulder 08/05/2016     Priority: Medium      Tendinitis of left rotator cuff 08/05/2016     Priority: Medium     Partial tear of left subscapularis tendon 08/04/2016     Priority: Medium     Elevated LFTs 09/02/2015     Priority: Medium     Obesity, Class II, BMI 35-39.9, with comorbidity 08/20/2014     Priority: Medium     REKHA on CPAP - uses daily, finds helpful 08/20/2014     Priority: Medium     Plantar fasciitis 04/06/2011     Priority: Medium     Pain in limb 04/05/2011     Priority: Medium        Past Medical History:    Past Medical History:   Diagnosis Date     Hyperlipidemia      Impingement syndrome of left shoulder      Lumbar sprain      Other shoulder lesions, left shoulder      Other specified postprocedural states      Personal history of other endocrine, nutritional and metabolic disease      Primary osteoarthritis of shoulder      Sprain of other specified parts of left shoulder girdle, initial encounter        Past Surgical History:    Past Surgical History:   Procedure Laterality Date     ARTHROSCOPY KNEE      1980,Arthroscopic surgery of the left knee x 2, meniscus and lateral release     ARTHROSCOPY KNEE      1981,Lateral release, right knee     COLONOSCOPY  05/18/2017 05/18/2017,follow up 2027     OTHER SURGICAL HISTORY      2001,[id number],SLEEP STUDY PER PROTOCOL,Sleep study, [facility]     OTHER SURGICAL HISTORY      8/11/2016,[id number],OTHER,Left       Family History:    Family History   Problem Relation Age of Onset     Hypertension Father         Hypertension     Diabetes Father         Diabetes     Heart Disease Father         Heart Disease,pacemaker     Cancer Mother         Cancer,metastatic cancer, type is unknown     Other - See Comments Mother         smoker     Heart Disease Mother 58        Heart Disease,stent     Other - See Comments Brother          hemochromatosis     Hypertension Brother         Hypertension     Diabetes Brother         Diabetes     Hyperlipidemia Brother         Hyperlipidemia,hyperlipidemia      Hypertension Brother         Hypertension     Family History Negative Brother         Good Health     Genetic Disorder Other         Genetic,No Known FHx of colon Cancer     Heart Disease Brother 48        Heart Disease,?MI     Anesthesia Reaction No family hx of         Anesthesia Problem     Blood Disease No family hx of         Blood Disease,no bleeding or clotting       Social History:  Marital Status:   [2]  Social History     Tobacco Use     Smoking status: Former Smoker     Packs/day: 0.50     Years: 10.00     Pack years: 5.00     Types: Cigarettes     Last attempt to quit: 1994     Years since quittin.3     Smokeless tobacco: Current User     Types: Chew     Tobacco comment:  tin lasts a week, 30 years   Substance Use Topics     Alcohol use: Yes     Alcohol/week: 0.0 oz     Comment: occasional     Drug use: No        Medications:      albuterol (PROAIR HFA/PROVENTIL HFA/VENTOLIN HFA) 108 (90 Base) MCG/ACT inhaler   aspirin EC 81 MG EC tablet   glipiZIDE-metFORMIN (METAGLIP) 2.5-500 MG per tablet   lisinopril (PRINIVIL/ZESTRIL) 20 MG tablet   metFORMIN (GLUCOPHAGE) 850 MG tablet   metoprolol succinate (TOPROL-XL) 50 MG 24 hr tablet   Multiple Vitamin (MULTI-VITAMINS) TABS   simvastatin (ZOCOR) 10 MG tablet   spironolactone (ALDACTONE) 25 MG tablet   alcohol swab prep pads   blood glucose (NO BRAND SPECIFIED) test strip   blood glucose calibration (NO BRAND SPECIFIED) solution   blood glucose monitoring (ACCU-CHEK JAMAL PLUS) meter device kit   blood glucose monitoring (NO BRAND SPECIFIED) meter device kit   blood glucose monitoring (NO BRAND SPECIFIED) test strip   Blood Glucose Monitoring Suppl (SURECHEK BLOOD GLUCOSE MONITOR) W/DEVICE KIT   lancets 28G MISC   thin (NO BRAND SPECIFIED) lancets         Review of Systems   Constitutional: Positive for chills, diaphoresis, fatigue and fever. Negative for activity change, appetite change and unexpected weight change.   HENT: Positive for  "postnasal drip and sore throat. Negative for congestion, ear discharge, ear pain and sneezing.    Respiratory: Positive for cough, chest tightness, shortness of breath and wheezing.    Cardiovascular: Negative for chest pain, palpitations and leg swelling.   Gastrointestinal: Negative for abdominal distention, constipation, diarrhea, nausea and vomiting.   Genitourinary: Negative for dysuria, frequency, hematuria and urgency.   Musculoskeletal: Negative for arthralgias and myalgias.   Neurological: Negative for headaches.       Physical Exam   BP: (!) 147/96  Pulse: 83  Heart Rate: 82  Temp: 99.3  F (37.4  C)  Resp: 18  Height: 172.7 cm (5' 8\")  Weight: 99.8 kg (220 lb)  SpO2: 96 %      Physical Exam   Constitutional: He is oriented to person, place, and time. He appears distressed.   HENT:   Head: Normocephalic and atraumatic.   Right Ear: External ear normal.   Left Ear: External ear normal.   Nose: Nose normal.   Mouth/Throat: No oropharyngeal exudate.   Oropharynx shows mild erythema with minimal edema.   Eyes: Pupils are equal, round, and reactive to light. Conjunctivae and EOM are normal. Right eye exhibits no discharge. Left eye exhibits no discharge. No scleral icterus.   Neck: Normal range of motion. Neck supple. No JVD present. No tracheal deviation present. No thyromegaly present.   Cardiovascular: Normal rate, regular rhythm, normal heart sounds and intact distal pulses. Exam reveals no gallop and no friction rub.   No murmur heard.  Pulmonary/Chest: Tachypnea noted. He has wheezes. He has no rales.   Abdominal: Soft. He exhibits no distension and no mass. There is no tenderness. There is no rebound and no guarding.   Musculoskeletal: He exhibits no edema or tenderness.   Lymphadenopathy:     He has no cervical adenopathy.   Neurological: He is alert and oriented to person, place, and time.   Skin: Skin is warm. No rash noted. He is diaphoretic. No erythema. No pallor.   Psychiatric: He has a normal mood " and affect. His behavior is normal. Judgment and thought content normal.       ED Course        Procedures               EKG Interpretation:      Interpreted by Dr. Vel Gardner  Time reviewed: 0800  Symptoms at time of EKG: SOB   Rhythm: normal sinus   Rate: normal  Axis: normal  Ectopy: none  Conduction: normal  ST Segments/ T Waves: No ST-T wave changes  Q Waves: none  Comparison to prior: Unchanged from 08/09/2016    Clinical Impression: normal EKG, borderline LVH/LAD         Results for orders placed or performed during the hospital encounter of 04/15/19 (from the past 24 hour(s))   CBC with platelets differential   Result Value Ref Range    WBC 11.3 (H) 4.0 - 11.0 10e9/L    RBC Count 4.87 4.4 - 5.9 10e12/L    Hemoglobin 14.8 13.3 - 17.7 g/dL    Hematocrit 43.5 40.0 - 53.0 %    MCV 89 78 - 100 fl    MCH 30.4 26.5 - 33.0 pg    MCHC 34.0 31.5 - 36.5 g/dL    RDW 12.0 10.0 - 15.0 %    Platelet Count 277 150 - 450 10e9/L    Diff Method Automated Method     % Neutrophils 62.9 %    % Lymphocytes 25.0 %    % Monocytes 8.5 %    % Eosinophils 2.9 %    % Basophils 0.4 %    % Immature Granulocytes 0.3 %    Absolute Neutrophil 7.1 1.6 - 8.3 10e9/L    Absolute Lymphocytes 2.8 0.8 - 5.3 10e9/L    Absolute Monocytes 1.0 0.0 - 1.3 10e9/L    Absolute Eosinophils 0.3 0.0 - 0.7 10e9/L    Absolute Basophils 0.1 0.0 - 0.2 10e9/L    Abs Immature Granulocytes 0.0 0 - 0.4 10e9/L   Comprehensive metabolic panel   Result Value Ref Range    Sodium 138 134 - 144 mmol/L    Potassium 4.1 3.5 - 5.1 mmol/L    Chloride 104 98 - 107 mmol/L    Carbon Dioxide 21 21 - 31 mmol/L    Anion Gap 13 3 - 14 mmol/L    Glucose 171 (H) 70 - 105 mg/dL    Urea Nitrogen 15 7 - 25 mg/dL    Creatinine 1.05 0.70 - 1.30 mg/dL    GFR Estimate 74 >60 mL/min/[1.73_m2]    GFR Estimate If Black 89 >60 mL/min/[1.73_m2]    Calcium 9.9 8.6 - 10.3 mg/dL    Bilirubin Total 0.3 0.3 - 1.0 mg/dL    Albumin 4.4 3.5 - 5.7 g/dL    Protein Total 7.1 6.4 - 8.9 g/dL    Alkaline  Phosphatase 51 34 - 104 U/L    ALT 15 7 - 52 U/L    AST 16 13 - 39 U/L   Troponin I   Result Value Ref Range    Troponin I ES <0.030 0.000 - 0.034 ug/L   XR Chest 2 Views    Narrative    PROCEDURE: XR CHEST 2 VW 4/15/2019 8:26 AM    HISTORY: sob    COMPARISONS: 2/28/2019.    TECHNIQUE: 2 views.    FINDINGS: Heart and pulmonary vasculature are normal. No acute  infiltrate or effusion is seen.         Impression    IMPRESSION: No acute disease.    MIRZA PARDO MD       Medications   ipratropium - albuterol 0.5 mg/2.5 mg/3 mL (DUONEB) neb solution 3 mL (3 mLs Nebulization Given 4/15/19 0730)       Assessments & Plan (with Medical Decision Making)     I have reviewed the nursing notes.    I have reviewed the findings, diagnosis, plan and need for follow up with the patient.    Willis Oliveira is a 53 year old male with DMII and HTN who presents to the ED with concerns of SOB and 3 days of cough with a sore throat. The patient's clinical picture is most consistent with viral upper respiratory illness, however due to the patient's worsening SOB and complaints of chest pressure EKG and troponin were preformed and were unconcerning. WBC 11.3. CBC, CMP, and CXR unremarkable. The patient showed little symptomatic benefit from a Duoneb but has been saturating >95% during his visit. He was discharged with recommendations for symptomatic OTC treatments and an albuterol inhailer. The patient was in agreement with and expressed verbal understanding the plan.     Original Note written by Hugo Sanchez MS3 I reviewed and amended it. In addition patient was seen and examined by myself including both history and physical examination. My findings are reflected in the note above.           Medication List      Started    albuterol 108 (90 Base) MCG/ACT inhaler  Commonly known as:  PROAIR HFA/PROVENTIL HFA/VENTOLIN HFA  2 puffs, Inhalation, EVERY 6 HOURS PRN            Final diagnoses:   Acute bronchitis, unspecified organism        4/15/2019   [facility]     [facility] [place]  04/15/19 2298

## 2019-04-15 NOTE — ED AVS SNAPSHOT
[facility]  [address]  [place] [address]  Phone:  [phone]  Fax:  [phone]                                    Willis Oliveira   MRN: [id number]    Department:  [facility]   Date of Visit:  4/15/2019           After Visit Summary Signature Page    I have received my discharge instructions, and my questions have been answered. I have discussed any challenges I see with this plan with the nurse or doctor.    ..........................................................................................................................................  Patient/Patient Representative Signature      ..........................................................................................................................................  Patient Representative Print Name and Relationship to Patient    ..................................................               ................................................  Date                                   Time    ..........................................................................................................................................  Reviewed by Signature/Title    ...................................................              ..............................................  Date                                               Time          22EPIC Rev 08/18

## 2019-04-15 NOTE — PROGRESS NOTES
Patient was brought back to their room, rails were up and call light was within reach.     Handoff procedure information verbally given to Kat.

## 2019-05-09 RX ORDER — ALBUTEROL SULFATE 90 UG/1
AEROSOL, METERED RESPIRATORY (INHALATION)
Refills: 0 | OUTPATIENT
Start: 2019-05-09

## 2019-05-09 NOTE — TELEPHONE ENCOUNTER
Routing refill request to provider for review/approval because:  Inhaler originally prescribed by ER and no follow up noted with Boo Celeste since.    Patient was to follow up with Boo Celeste 4/18/19  Left message for patient to return call to verify need for inhaler and follow up with Boo Celeste    Patient returned call and patient states he does not need inhaler refilled at this time still has some, he did not request refill.  Patient advised to follow up with Dr. Celeste if still needing inhaler and patient states he still does take a puff once in a while and will call back when he has his calendar to schedule follow up .    Sandra Frey RN on 5/9/2019 at 8:16 AM

## 2019-06-07 ENCOUNTER — TELEPHONE (OUTPATIENT)
Dept: INTERNAL MEDICINE | Facility: OTHER | Age: 54
End: 2019-06-07

## 2019-06-07 DIAGNOSIS — I10 BENIGN ESSENTIAL HYPERTENSION: ICD-10-CM

## 2019-06-07 DIAGNOSIS — E11.9 TYPE 2 DIABETES MELLITUS WITHOUT COMPLICATION, WITHOUT LONG-TERM CURRENT USE OF INSULIN (H): Primary | ICD-10-CM

## 2019-06-07 DIAGNOSIS — E78.2 MIXED HYPERLIPIDEMIA: ICD-10-CM

## 2019-06-07 NOTE — TELEPHONE ENCOUNTER
Patient is over due for DM labs.  Order pending, will contact patient once orders are signed.      Panel Management Review      Patient has the following on his problem list:     Diabetes    ASA: Passed    Last A1C  Lab Results   Component Value Date    A1C 6.7 08/21/2018     A1C tested: FAILED    Last LDL:    Lab Results   Component Value Date    CHOL 142 08/21/2018     Lab Results   Component Value Date    HDL 45 08/21/2018     Lab Results   Component Value Date    LDL 67 08/21/2018     Lab Results   Component Value Date    TRIG 151 08/21/2018     No results found for: CHOLHDLRATIO  Lab Results   Component Value Date    NHDL 97 08/21/2018       Is the patient on a Statin? YES             Is the patient on Aspirin? YES    Medications     HMG CoA Reductase Inhibitors     simvastatin (ZOCOR) 10 MG tablet       Salicylates     aspirin EC 81 MG EC tablet             Last three blood pressure readings:  BP Readings from Last 3 Encounters:   04/15/19 115/74   02/28/19 130/76   08/21/18 122/74       Date of last diabetes office visit: 8-21-18     Tobacco History:     History   Smoking Status     Former Smoker     Packs/day: 0.50     Years: 10.00     Types: Cigarettes     Quit date: 1/1/1994   Smokeless Tobacco     Current User     Types: Chew     Comment:  tin lasts a week, 30 years           Composite cancer screening  Chart review shows that this patient is due/due soon for the following None  Summary:    Patient is due/failing the following:   A1C    Action needed:   Patient needs fasting lab only appointment and/or office visit    Type of outreach:    orders pending, will contact patient when orders are signed    Questions for provider review:    None                                                                                                                                    Claudette Ruiz LPN 6/7/2019 10:29 AM         Chart routed to Provider .

## 2019-06-07 NOTE — LETTER
[facility]  [address]  [place] [address]  [phone]       June 14, 2019    Willis Oliveira  [address] [place] [id number]    Dear Willis,    We care about your health and have reviewed your health plan and are making recommendations based on this review, to optimize your health.    You are in particular need of attention regarding:  -Diabetes    We are recommending that you:  -schedule a FOLLOWUP OFFICE APPOINTMENT with me.  Diabetic labs have been ordered if you would like to get them checked prior to your future office visit with Dr. Celeste.      Thank you for trusting [facility] and we appreciate the opportunity to serve you.  We look forward to supporting your healthcare needs in the future.    Healthy Regards,    Your [facility] Team

## 2019-06-12 NOTE — TELEPHONE ENCOUNTER
Left message to call back....................  6/12/2019   2:00 PM  Amy Quick LPN 6/12/2019   2:00 PM

## 2019-08-24 DIAGNOSIS — E78.2 MIXED HYPERLIPIDEMIA: ICD-10-CM

## 2019-08-24 NOTE — LETTER
August 28, 2019      Willis Oliveira  [address] [address] [address]        A refill request was received from your pharmacy for Simvastatin.    Additional refills require an office visit with Dr. Celeste for annual review and labs.    Please call [phone] to schedule appointment.        Sincerely,      Refill Nurse

## 2019-08-28 RX ORDER — SIMVASTATIN 10 MG
10 TABLET ORAL AT BEDTIME
Qty: 90 TABLET | Refills: 0 | Status: SHIPPED | OUTPATIENT
Start: 2019-08-28 | End: 2019-11-25

## 2019-08-28 NOTE — TELEPHONE ENCOUNTER
Prescription approved per [facility] Refill Protocol.  LOV and labs : 8/21/18  Patient is due for annual review and labs   Letter and my chart message sent.    Sandra Frey RN on 8/28/2019 at 1:57 PM

## 2019-09-21 DIAGNOSIS — I10 ESSENTIAL HYPERTENSION: ICD-10-CM

## 2019-09-21 DIAGNOSIS — R73.03 PREDIABETES: ICD-10-CM

## 2019-09-21 DIAGNOSIS — Z86.39 HISTORY OF TYPE 2 DIABETES MELLITUS: ICD-10-CM

## 2019-09-25 RX ORDER — GLIPIZIDE AND METFORMIN HCL 2.5; 5 MG/1; MG/1
TABLET, FILM COATED ORAL
Qty: 180 TABLET | Refills: 3 | Status: SHIPPED | OUTPATIENT
Start: 2019-09-25 | End: 2020-09-17

## 2019-09-25 RX ORDER — LISINOPRIL 20 MG/1
TABLET ORAL
Qty: 90 TABLET | Refills: 3 | Status: SHIPPED | OUTPATIENT
Start: 2019-09-25 | End: 2020-09-17

## 2019-09-25 NOTE — TELEPHONE ENCOUNTER
Routing refill request to provider for review/approval because:   Patient has a documented LDL level within past 12 mos.    Patient has a documented Microalbumin level within past 12 mos.    Patient has documented A1c within the specified period of time.    Recent (6 mo) or future (30 days) visit within the authorizing provider's specialty     LOV: 8/21/18  Letter and my chart messages already sent.  Attempted to contact patient and phone busy  Sandra Frye RN on 9/25/2019 at 9:46 AM

## 2019-10-18 DIAGNOSIS — I10 ESSENTIAL HYPERTENSION: ICD-10-CM

## 2019-10-18 NOTE — TELEPHONE ENCOUNTER
Routing refill request to provider for review/approval because:    Patient needs to be seen because it has been more than 1 year since last office visit.    LOV: 8/21/18  Called patient and patient states that he will be calling in the next few weeks to schedule appointment.    Sandra Frey RN on 10/18/2019 at 12:00 PM

## 2019-10-21 RX ORDER — METOPROLOL SUCCINATE 50 MG/1
TABLET, EXTENDED RELEASE ORAL
Qty: 90 TABLET | Refills: 3 | Status: SHIPPED | OUTPATIENT
Start: 2019-10-21 | End: 2020-09-17

## 2019-10-21 RX ORDER — SPIRONOLACTONE 25 MG/1
TABLET ORAL
Qty: 90 TABLET | Refills: 3 | Status: SHIPPED | OUTPATIENT
Start: 2019-10-21 | End: 2020-09-17

## 2019-11-25 DIAGNOSIS — E78.2 MIXED HYPERLIPIDEMIA: ICD-10-CM

## 2019-11-27 RX ORDER — SIMVASTATIN 10 MG
TABLET ORAL
Qty: 90 TABLET | Refills: 0 | Status: SHIPPED | OUTPATIENT
Start: 2019-11-27 | End: 2020-02-25

## 2019-11-27 NOTE — TELEPHONE ENCOUNTER
[facility] requesting refill for Simvastatin 10mg tab.    Prescription approved per [facility] Protocol.    Patient needs to have LDL completed.  1x audi refill given and letter sent to patient informing him that he needs a medication management appointment for further refills, as patient has no future visits. Chyna Singleton RN on 11/27/2019 at 3:25 PM

## 2020-02-24 DIAGNOSIS — E78.2 MIXED HYPERLIPIDEMIA: ICD-10-CM

## 2020-02-24 NOTE — LETTER
February 25, 2020      Willis Oliveira  [address] [address] [address]        Dear Willis,     A refill request was received from your pharmacy for Simvastatin.    Additional refills require an office visit with Dr. Celeste for annual review and labs.    Please call [phone] to schedule appointment.      Sincerely,      Refill Nurse

## 2020-02-25 RX ORDER — SIMVASTATIN 10 MG
TABLET ORAL
Qty: 90 TABLET | Refills: 3 | Status: SHIPPED | OUTPATIENT
Start: 2020-02-25 | End: 2020-09-17

## 2020-04-21 NOTE — ED NOTES
Pt extremely anxious with any tests being done.  
Pt received neb and feels he is doing worse, hyperventilating and cannot settle down.  
Pt states that he does not feel any better.  Appears less anxious, breathing easier.  
DISPLAY PLAN FREE TEXT

## 2020-06-18 RX ORDER — ALBUTEROL SULFATE 90 UG/1
AEROSOL, METERED RESPIRATORY (INHALATION)
Qty: 8.5 INHALER | Refills: 0 | OUTPATIENT
Start: 2020-06-18

## 2020-06-18 NOTE — TELEPHONE ENCOUNTER
Refill denied    Medication prescribed in ER on 4/15/2019 Patient needs appointment. Chyna Singleton RN on 6/18/2020 at 11:16 AM

## 2020-09-15 DIAGNOSIS — R73.03 PREDIABETES: ICD-10-CM

## 2020-09-15 DIAGNOSIS — Z86.39 HISTORY OF TYPE 2 DIABETES MELLITUS: ICD-10-CM

## 2020-09-15 DIAGNOSIS — I10 ESSENTIAL HYPERTENSION: ICD-10-CM

## 2020-09-16 NOTE — TELEPHONE ENCOUNTER
Patient is overdue for a physical.      Please see if he is available at 1 of the same day appointment spots 9/17 or 9/18.    Boo Celeste MD

## 2020-09-16 NOTE — TELEPHONE ENCOUNTER
[facility] sent Rx request for the following:   metoprolol succinate ER (TOPROL-XL) 50 MG 24 hr tablet   Sig: TAKE 1 TABLET BY MOUTH EVERY DAY     Last Prescription Date:   10/21/2019  Last Fill Qty/Refills:         90, R-3    Last Office Visit:              08/21/2018   Future Office visit:           none  Routing refill request to provider for review/approval because:  Blood pressure out of range   Patient needs to be seen because it has been more than 1 year since last office visit.    [facility] sent Rx request for the following:   lisinopril (PRINIVIL/ZESTRIL) 20 MG tablet   Sig: TAKE 1 TABLET BY MOUTH EVERY DAY     Last Prescription Date:   09/25/2019  Last Fill Qty/Refills:         90, R-3    Last Office Visit:              08/21/2018   Future Office visit:           none  Routing refill request to provider for review/approval because:  Blood pressure out of range   Labs not current:  Creat,K+  Patient needs to be seen because it has been more than 1 year since last office visit.    [facility] sent Rx request for the following:   metFORMIN (GLUCOPHAGE) 850 MG tablet   Sig: TAKE 1 TABLET (850 MG) BY MOUTH 2 TIMES DAILY (WITH MEALS) - Oral     Last Prescription Date:   09/25/2019  Last Fill Qty/Refills:         180, R-3    Last Office Visit:              08/21/2018   Future Office visit:           none  Routing refill request to provider for review/approval because:  Biguanide Agents Failed  Patient has documented A1c within the specified period of time.     Patient's CR is NOT>1.4 OR Patient's EGFR is NOT<45 within past 12 mos.     Recent (6 mo) or future (30 days) visit within the authorizing provider's specialty         [facility] sent Rx request for the following:   spironolactone (ALDACTONE) 25 MG tablet   Sig: TAKE 1 TABLET BY MOUTH EVERY DAY     Last Prescription Date:   10/21/2019  Last Fill Qty/Refills:         90, R-3    Last Office Visit:              08/21/2018   Future Office visit:            none  Routing refill request to provider for review/approval because:  Blood pressure out of range   Labs not current:  Creat,K+,Na  Patient needs to be seen because it has been more than 1 year since last office visit.    [facility] sent Rx request for the following:   glipiZIDE-metFORMIN (METAGLIP) 2.5-500 MG tablet   Sig: TAKE 1 TABLET BY MOUTH TWICE A DAY WITH MEALS     Last Prescription Date:   09/25/2019  Last Fill Qty/Refills:         180, R-3    Last Office Visit:              08/21/2018   Future Office visit:           none  Routing refill request to provider for review/approval because:  Biguanide Agents Failed  Patient has documented A1c within the specified period of time.     Patient's CR is NOT>1.4 OR Patient's EGFR is NOT<45 within past 12 mos.     Recent (6 mo) or future (30 days) visit within the authorizing provider's specialty       Unable to complete prescription refill per RN Medication Refill Policy.Pt is due for medication management appt.  appt reminder letters sentx3, will add note to Rx, cecil up labs and meds, and route to provider for consideration.      ................... Joann Pizano RN ....................  9/16/2020   3:07 PM

## 2020-09-17 ENCOUNTER — OFFICE VISIT (OUTPATIENT)
Dept: INTERNAL MEDICINE | Facility: OTHER | Age: 55
End: 2020-09-17
Attending: INTERNAL MEDICINE
Payer: COMMERCIAL

## 2020-09-17 VITALS
HEIGHT: 70 IN | TEMPERATURE: 97.4 F | RESPIRATION RATE: 16 BRPM | SYSTOLIC BLOOD PRESSURE: 138 MMHG | WEIGHT: 222.25 LBS | BODY MASS INDEX: 31.82 KG/M2 | HEART RATE: 60 BPM | DIASTOLIC BLOOD PRESSURE: 88 MMHG

## 2020-09-17 DIAGNOSIS — Z23 NEED FOR PROPHYLACTIC VACCINATION AND INOCULATION AGAINST INFLUENZA: ICD-10-CM

## 2020-09-17 DIAGNOSIS — E78.2 MIXED HYPERLIPIDEMIA: ICD-10-CM

## 2020-09-17 DIAGNOSIS — I10 BENIGN ESSENTIAL HYPERTENSION: ICD-10-CM

## 2020-09-17 DIAGNOSIS — G47.33 OSA ON CPAP: ICD-10-CM

## 2020-09-17 DIAGNOSIS — E11.9 TYPE 2 DIABETES MELLITUS WITHOUT COMPLICATION, WITHOUT LONG-TERM CURRENT USE OF INSULIN (H): ICD-10-CM

## 2020-09-17 DIAGNOSIS — Z00.00 ANNUAL PHYSICAL EXAM: Primary | ICD-10-CM

## 2020-09-17 LAB
ALBUMIN SERPL-MCNC: 4.8 G/DL (ref 3.5–5.7)
ALBUMIN UR-MCNC: NEGATIVE MG/DL
ALP SERPL-CCNC: 45 U/L (ref 34–104)
ALT SERPL W P-5'-P-CCNC: 28 U/L (ref 7–52)
ANION GAP SERPL CALCULATED.3IONS-SCNC: 8 MMOL/L (ref 3–14)
APPEARANCE UR: CLEAR
AST SERPL W P-5'-P-CCNC: 23 U/L (ref 13–39)
BILIRUB SERPL-MCNC: 0.6 MG/DL (ref 0.3–1)
BILIRUB UR QL STRIP: NEGATIVE
BUN SERPL-MCNC: 10 MG/DL (ref 7–25)
CALCIUM SERPL-MCNC: 9.2 MG/DL (ref 8.6–10.3)
CHLORIDE SERPL-SCNC: 104 MMOL/L (ref 98–107)
CHOLEST SERPL-MCNC: 167 MG/DL
CO2 SERPL-SCNC: 30 MMOL/L (ref 21–31)
COLOR UR AUTO: YELLOW
CREAT SERPL-MCNC: 0.94 MG/DL (ref 0.7–1.3)
ERYTHROCYTE [DISTWIDTH] IN BLOOD BY AUTOMATED COUNT: 11.9 % (ref 10–15)
GFR SERPL CREATININE-BSD FRML MDRD: 84 ML/MIN/{1.73_M2}
GLUCOSE SERPL-MCNC: 100 MG/DL (ref 70–105)
GLUCOSE UR STRIP-MCNC: NEGATIVE MG/DL
HBA1C MFR BLD: 6.3 % (ref 4–6)
HCT VFR BLD AUTO: 42.8 % (ref 40–53)
HDLC SERPL-MCNC: 59 MG/DL (ref 23–92)
HGB BLD-MCNC: 14.3 G/DL (ref 13.3–17.7)
HGB UR QL STRIP: NEGATIVE
KETONES UR STRIP-MCNC: NEGATIVE MG/DL
LDLC SERPL CALC-MCNC: 87 MG/DL
LEUKOCYTE ESTERASE UR QL STRIP: NEGATIVE
MCH RBC QN AUTO: 31.2 PG (ref 26.5–33)
MCHC RBC AUTO-ENTMCNC: 33.4 G/DL (ref 31.5–36.5)
MCV RBC AUTO: 93 FL (ref 78–100)
NITRATE UR QL: NEGATIVE
NONHDLC SERPL-MCNC: 108 MG/DL
PH UR STRIP: 6 PH (ref 5–7)
PLATELET # BLD AUTO: 256 10E9/L (ref 150–450)
POTASSIUM SERPL-SCNC: 3.8 MMOL/L (ref 3.5–5.1)
PROT SERPL-MCNC: 7.6 G/DL (ref 6.4–8.9)
RBC # BLD AUTO: 4.58 10E12/L (ref 4.4–5.9)
SODIUM SERPL-SCNC: 142 MMOL/L (ref 134–144)
SOURCE: NORMAL
SP GR UR STRIP: 1 (ref 1–1.03)
TRIGL SERPL-MCNC: 105 MG/DL
TSH SERPL DL<=0.05 MIU/L-ACNC: 1.73 IU/ML (ref 0.34–5.6)
UROBILINOGEN UR STRIP-MCNC: NORMAL MG/DL (ref 0–2)
WBC # BLD AUTO: 6.5 10E9/L (ref 4–11)

## 2020-09-17 PROCEDURE — 85027 COMPLETE CBC AUTOMATED: CPT | Mod: ZL | Performed by: INTERNAL MEDICINE

## 2020-09-17 PROCEDURE — 84443 ASSAY THYROID STIM HORMONE: CPT | Mod: ZL | Performed by: INTERNAL MEDICINE

## 2020-09-17 PROCEDURE — 99396 PREV VISIT EST AGE 40-64: CPT | Mod: 25 | Performed by: INTERNAL MEDICINE

## 2020-09-17 PROCEDURE — 36415 COLL VENOUS BLD VENIPUNCTURE: CPT | Mod: ZL | Performed by: INTERNAL MEDICINE

## 2020-09-17 PROCEDURE — 80061 LIPID PANEL: CPT | Mod: ZL | Performed by: INTERNAL MEDICINE

## 2020-09-17 PROCEDURE — 83036 HEMOGLOBIN GLYCOSYLATED A1C: CPT | Mod: ZL | Performed by: INTERNAL MEDICINE

## 2020-09-17 PROCEDURE — 81003 URINALYSIS AUTO W/O SCOPE: CPT | Mod: ZL | Performed by: INTERNAL MEDICINE

## 2020-09-17 PROCEDURE — 82043 UR ALBUMIN QUANTITATIVE: CPT | Mod: ZL | Performed by: INTERNAL MEDICINE

## 2020-09-17 PROCEDURE — 90686 IIV4 VACC NO PRSV 0.5 ML IM: CPT | Performed by: INTERNAL MEDICINE

## 2020-09-17 PROCEDURE — 90471 IMMUNIZATION ADMIN: CPT | Performed by: INTERNAL MEDICINE

## 2020-09-17 PROCEDURE — 80053 COMPREHEN METABOLIC PANEL: CPT | Mod: ZL | Performed by: INTERNAL MEDICINE

## 2020-09-17 RX ORDER — GLIPIZIDE AND METFORMIN HCL 2.5; 5 MG/1; MG/1
1 TABLET, FILM COATED ORAL
Qty: 180 TABLET | Refills: 3 | Status: SHIPPED | OUTPATIENT
Start: 2020-09-17 | End: 2021-09-15

## 2020-09-17 RX ORDER — SPIRONOLACTONE 25 MG/1
TABLET ORAL
Qty: 90 TABLET | Refills: 3 | OUTPATIENT
Start: 2020-09-17

## 2020-09-17 RX ORDER — SIMVASTATIN 10 MG
10 TABLET ORAL AT BEDTIME
Qty: 90 TABLET | Refills: 3 | Status: SHIPPED | OUTPATIENT
Start: 2020-09-17 | End: 2020-11-13

## 2020-09-17 RX ORDER — METOPROLOL SUCCINATE 50 MG/1
50 TABLET, EXTENDED RELEASE ORAL DAILY
Qty: 90 TABLET | Refills: 3 | Status: SHIPPED | OUTPATIENT
Start: 2020-09-17 | End: 2021-09-15

## 2020-09-17 RX ORDER — GLIPIZIDE AND METFORMIN HCL 2.5; 5 MG/1; MG/1
TABLET, FILM COATED ORAL
Qty: 90 TABLET | Refills: 0 | OUTPATIENT
Start: 2020-09-17

## 2020-09-17 RX ORDER — LANCETS
EACH MISCELLANEOUS
Qty: 500 EACH | Refills: 3 | Status: SHIPPED | OUTPATIENT
Start: 2020-09-17

## 2020-09-17 RX ORDER — LISINOPRIL 20 MG/1
20 TABLET ORAL DAILY
Qty: 90 TABLET | Refills: 3 | Status: SHIPPED | OUTPATIENT
Start: 2020-09-17 | End: 2021-09-15

## 2020-09-17 RX ORDER — ALBUTEROL SULFATE 90 UG/1
2 AEROSOL, METERED RESPIRATORY (INHALATION) EVERY 6 HOURS PRN
Qty: 18 G | Refills: 0 | Status: CANCELLED | OUTPATIENT
Start: 2020-09-17

## 2020-09-17 RX ORDER — METOPROLOL SUCCINATE 50 MG/1
TABLET, EXTENDED RELEASE ORAL
Qty: 90 TABLET | Refills: 3 | OUTPATIENT
Start: 2020-09-17

## 2020-09-17 RX ORDER — LISINOPRIL 20 MG/1
TABLET ORAL
Qty: 90 TABLET | Refills: 3 | OUTPATIENT
Start: 2020-09-17

## 2020-09-17 RX ORDER — SPIRONOLACTONE 25 MG/1
25 TABLET ORAL DAILY
Qty: 90 TABLET | Refills: 3 | Status: SHIPPED | OUTPATIENT
Start: 2020-09-17 | End: 2021-09-15

## 2020-09-17 ASSESSMENT — ENCOUNTER SYMPTOMS
BRUISES/BLEEDS EASILY: 0
WHEEZING: 0
HEMATURIA: 0
LIGHT-HEADEDNESS: 0
NAUSEA: 0
VOMITING: 0
ABDOMINAL PAIN: 0
WOUND: 0
DYSURIA: 0
SHORTNESS OF BREATH: 0
CONFUSION: 0
COUGH: 0
APNEA: 1
MYALGIAS: 0
CHILLS: 0
PALPITATIONS: 0
DIZZINESS: 0
ARTHRALGIAS: 0
FEVER: 0
DIARRHEA: 0
AGITATION: 0

## 2020-09-17 ASSESSMENT — ANXIETY QUESTIONNAIRES

## 2020-09-17 ASSESSMENT — PATIENT HEALTH QUESTIONNAIRE - PHQ9
5. POOR APPETITE OR OVEREATING: NOT AT ALL
SUM OF ALL RESPONSES TO PHQ QUESTIONS 1-9: 0

## 2020-09-17 ASSESSMENT — MIFFLIN-ST. JEOR: SCORE: 1850.4

## 2020-09-17 ASSESSMENT — PAIN SCALES - GENERAL: PAINLEVEL: NO PAIN (0)

## 2020-09-17 NOTE — NURSING NOTE
"Patient presents to the clinic for annual physical.    Previous A1C is at goal of <8  Lab Results   Component Value Date    A1C 6.7 08/21/2018     Urine microalbumin:creatine: n/a  Foot exam unknown-declines  Eye exam years ago    Tobacco User YES  Patient is on a daily aspirin  Patient is on a Statin.  Blood pressure today of:     BP Readings from Last 1 Encounters:   09/17/20 (!) 154/100      is not at the goal of <139/89 for diabetics.    Chief Complaint   Patient presents with     Physical     Imm/Inj     Flu Shot       Initial BP (!) 154/100 (BP Location: Right arm, Patient Position: Sitting, Cuff Size: Adult Regular)   Pulse 60   Temp 97.4  F (36.3  C) (Tympanic)   Resp 16   Ht 1.772 m (5' 9.75\")   Wt 100.8 kg (222 lb 4 oz)   BMI 32.12 kg/m   Estimated body mass index is 32.12 kg/m  as calculated from the following:    Height as of this encounter: 1.772 m (5' 9.75\").    Weight as of this encounter: 100.8 kg (222 lb 4 oz).  Medication Reconciliation: complete    Claudette Leung LPN      "

## 2020-09-17 NOTE — NURSING NOTE
Immunization Documentation    Prior to Immunization administration, verified patients identity using patient's name and date of birth. Please see IMMUNIZATIONS  and order for additional information.  Patient / Parent instructed to remain in clinic for 15 minutes and report any adverse reaction to staff immediately.    Was entire vial of medication used? Yes  Vial/Syringe: Effie Leung LPN  9/17/2020   5:22 PM

## 2020-09-17 NOTE — TELEPHONE ENCOUNTER
Spoke with patient and an appointment for his Physical was made for end of the day today with Dr. Celeste.  Laine Mcintyre on 9/17/2020 at 11:26 AM

## 2020-09-17 NOTE — LETTER
September 21, 2020      Willis Oliveira  [address] [address] [address]        Dear ,    We are writing to inform you of your test results.    A1c has improved.   Labs are otherwise stable. Continue current medications.   Plan to recheck labs again in 3 to 4 months.     Boo Celeste MD      Resulted Orders   TSH Reflex GH   Result Value Ref Range    TSH Reflex 1.73 0.34 - 5.60 IU/mL   Lipid Profile   Result Value Ref Range    Cholesterol 167 <200 mg/dL    Triglycerides 105 <150 mg/dL    HDL Cholesterol 59 23 - 92 mg/dL    LDL Cholesterol Calculated 87 <100 mg/dL      Comment:      Desirable:       <100 mg/dl    Non HDL Cholesterol 108 <130 mg/dL   Comprehensive metabolic panel   Result Value Ref Range    Sodium 142 134 - 144 mmol/L    Potassium 3.8 3.5 - 5.1 mmol/L    Chloride 104 98 - 107 mmol/L    Carbon Dioxide 30 21 - 31 mmol/L    Anion Gap 8 3 - 14 mmol/L    Glucose 100 70 - 105 mg/dL    Urea Nitrogen 10 7 - 25 mg/dL    Creatinine 0.94 0.70 - 1.30 mg/dL    GFR Estimate 84 >60 mL/min/[1.73_m2]    GFR Estimate If Black >90 >60 mL/min/[1.73_m2]    Calcium 9.2 8.6 - 10.3 mg/dL    Bilirubin Total 0.6 0.3 - 1.0 mg/dL    Albumin 4.8 3.5 - 5.7 g/dL    Protein Total 7.6 6.4 - 8.9 g/dL    Alkaline Phosphatase 45 34 - 104 U/L    ALT 28 7 - 52 U/L    AST 23 13 - 39 U/L   CBC with platelets   Result Value Ref Range    WBC 6.5 4.0 - 11.0 10e9/L    RBC Count 4.58 4.4 - 5.9 10e12/L    Hemoglobin 14.3 13.3 - 17.7 g/dL    Hematocrit 42.8 40.0 - 53.0 %    MCV 93 78 - 100 fl    MCH 31.2 26.5 - 33.0 pg    MCHC 33.4 31.5 - 36.5 g/dL    RDW 11.9 10.0 - 15.0 %    Platelet Count 256 150 - 450 10e9/L   Hemoglobin A1c   Result Value Ref Range    Hemoglobin A1C 6.3 (H) 4.0 - 6.0 %   UA reflex to Microscopic   Result Value Ref Range    Color Urine Yellow     Appearance Urine Clear     Glucose Urine Negative NEG^Negative mg/dL    Bilirubin Urine Negative NEG^Negative    Ketones Urine Negative NEG^Negative mg/dL     Specific Gravity Urine 1.005 1.003 - 1.035    Blood Urine Negative NEG^Negative    pH Urine 6.0 5.0 - 7.0 pH    Protein Albumin Urine Negative NEG^Negative mg/dL    Urobilinogen mg/dL Normal 0.0 - 2.0 mg/dL    Nitrite Urine Negative NEG^Negative    Leukocyte Esterase Urine Negative NEG^Negative    Source Midstream Urine    Albumin Random Urine Quantitative with Creat Ratio   Result Value Ref Range    Creatinine Urine 23 mg/dL    Albumin Urine mg/L <5 mg/L    Albumin Urine mg/g Cr Unable to calculate due to low value 0 - 17 mg/g Cr       If you have any questions or concerns, please call the clinic at the number listed above.       Sincerely,        Boo Celeste MD

## 2020-09-17 NOTE — PROGRESS NOTES
"Nursing Notes:   Claudette Leung LPN  9/17/2020  4:52 PM  Signed  Patient presents to the clinic for annual physical.    Previous A1C is at goal of <8  Lab Results   Component Value Date    A1C 6.7 08/21/2018     Urine microalbumin:creatine: n/a  Foot exam unknown-declines  Eye exam years ago    Tobacco User YES  Patient is on a daily aspirin  Patient is on a Statin.  Blood pressure today of:     BP Readings from Last 1 Encounters:   09/17/20 (!) 154/100      is not at the goal of <139/89 for diabetics.    Chief Complaint   Patient presents with     Physical     Imm/Inj     Flu Shot       Initial BP (!) 154/100 (BP Location: Right arm, Patient Position: Sitting, Cuff Size: Adult Regular)   Pulse 60   Temp 97.4  F (36.3  C) (Tympanic)   Resp 16   Ht 1.772 m (5' 9.75\")   Wt 100.8 kg (222 lb 4 oz)   BMI 32.12 kg/m   Estimated body mass index is 32.12 kg/m  as calculated from the following:    Height as of this encounter: 1.772 m (5' 9.75\").    Weight as of this encounter: 100.8 kg (222 lb 4 oz).  Medication Reconciliation: complete    Claudette Leung LPN      Nursing note reviewed with patient.  Accuracy and completeness verified.   Mr. Oliveira is a 54 year old male who:  Patient presents with:  Physical  Imm/Inj: Flu Shot      ICD-10-CM    1. Annual physical exam  Z00.00    2. Type 2 diabetes mellitus without complication, without long-term current use of insulin (H)  E11.9 blood glucose (NO BRAND SPECIFIED) test strip     glipiZIDE-metFORMIN (METAGLIP) 2.5-500 MG tablet     metFORMIN (GLUCOPHAGE) 850 MG tablet     thin (NO BRAND SPECIFIED) lancets     Albumin Random Urine Quantitative with Creat Ratio     Hemoglobin A1c     UA reflex to Microscopic     TSH Reflex GH   3. Mixed hyperlipidemia  E78.2 simvastatin (ZOCOR) 10 MG tablet     Lipid Profile   4. Need for prophylactic vaccination and inoculation against influenza  Z23 INFLUENZA VACCINE IM > 6 MONTHS VALENT IIV4 [04212]   5. Benign essential " hypertension  I10 lisinopril (ZESTRIL) 20 MG tablet     metoprolol succinate ER (TOPROL-XL) 50 MG 24 hr tablet     spironolactone (ALDACTONE) 25 MG tablet     CBC with platelets     Comprehensive metabolic panel   6. REKHA on CPAP - uses daily, finds helpful and beneficial  G47.33 CPAP Order for DME - ONLY FOR DME    Z99.89      HPI  Patient presents for annual physical.    Type 2 diabetes, has been doing well with metformin and glipizide.  Blood sugars typically running 100-110.  Needs updated medications.  Check labs.    Mixed hyperlipidemia, doing well with simvastatin.  Tolerating well.  No side effects reported.  Currently 10 mg daily.  Check labs.  Needs refills.    Flu shot today.    Hypertension, blood pressures are currently well controlled.  Doing well with current medication regimen.  Needs refills.  Check labs.    The virus pandemic has caused some significant anxiety for Wai.  States that he has fears of going to public places with very many people especially coming to the clinic.    Sleep apnea, still using regularly.  His CPAP machine is old.  He needs updated equipment.  His sleep study was performed 9/24/2015.  It is found in our scanned documents.  Prescriptions were completed today for new CPAP supplies.    Functional Capacity: > 4 METS.   Review of Systems   Constitutional: Negative for chills and fever.   HENT: Negative for congestion and hearing loss.    Eyes: Negative for visual disturbance.   Respiratory: Positive for apnea (Uses CPAP regularly, finds helpful and beneficial). Negative for cough, shortness of breath and wheezing.    Cardiovascular: Negative for chest pain and palpitations.   Gastrointestinal: Negative for abdominal pain, diarrhea, nausea and vomiting.   Endocrine: Negative for cold intolerance and heat intolerance.   Genitourinary: Negative for dysuria and hematuria.   Musculoskeletal: Negative for arthralgias and myalgias.   Skin: Negative for rash and wound.  "  Allergic/Immunologic: Negative for immunocompromised state.   Neurological: Negative for dizziness and light-headedness.   Hematological: Does not bruise/bleed easily.   Psychiatric/Behavioral: Negative for agitation and confusion.        Reviewed and updated as needed this visit by Provider   Tobacco  Allergies  Meds  Problems  Med Hx  Surg Hx  Fam Hx         EXAM:   Vitals:    09/17/20 1642   BP: 138/88   BP Location: Right arm   Patient Position: Sitting   Cuff Size: Adult Regular   Pulse: 60   Resp: 16   Temp: 97.4  F (36.3  C)   TempSrc: Tympanic   Weight: 100.8 kg (222 lb 4 oz)   Height: 1.772 m (5' 9.75\")       Current Pain Score: No Pain (0)     BP Readings from Last 3 Encounters:   09/17/20 138/88   04/15/19 115/74   02/28/19 130/76      Wt Readings from Last 3 Encounters:   09/17/20 100.8 kg (222 lb 4 oz)   04/15/19 99.8 kg (220 lb)   02/28/19 102.1 kg (225 lb 1.6 oz)      Estimated body mass index is 32.12 kg/m  as calculated from the following:    Height as of this encounter: 1.772 m (5' 9.75\").    Weight as of this encounter: 100.8 kg (222 lb 4 oz).     Physical Exam  Constitutional:       General: He is not in acute distress.     Appearance: He is well-developed. He is not diaphoretic.   HENT:      Head: Normocephalic and atraumatic.   Eyes:      General: No scleral icterus.     Conjunctiva/sclera: Conjunctivae normal.   Neck:      Musculoskeletal: Neck supple.      Vascular: No carotid bruit.   Cardiovascular:      Rate and Rhythm: Normal rate and regular rhythm.      Pulses: Normal pulses.   Pulmonary:      Effort: Pulmonary effort is normal.      Breath sounds: Normal breath sounds.   Abdominal:      Palpations: Abdomen is soft.      Tenderness: There is no abdominal tenderness.   Musculoskeletal:         General: No deformity.      Right lower leg: No edema.      Left lower leg: No edema.   Lymphadenopathy:      Cervical: No cervical adenopathy.   Skin:     General: Skin is warm and dry. "      Findings: No rash.   Neurological:      Mental Status: He is alert and oriented to person, place, and time. Mental status is at baseline.   Psychiatric:         Mood and Affect: Mood normal.         Behavior: Behavior normal.          Procedures   INVESTIGATIONS:  - Labs reviewed in Epic     ASSESSMENT AND PLAN:  Problem List Items Addressed This Visit        Respiratory    REKHA on CPAP - uses daily, finds helpful and beneficial    Relevant Orders    CPAP Order for DME - ONLY FOR DME (Completed)       Endocrine    Mixed hyperlipidemia    Relevant Medications    simvastatin (ZOCOR) 10 MG tablet    Other Relevant Orders    Lipid Profile      Other Visit Diagnoses     Annual physical exam    -  Primary    Type 2 diabetes mellitus without complication, without long-term current use of insulin (H)        Relevant Medications    blood glucose (NO BRAND SPECIFIED) test strip    glipiZIDE-metFORMIN (METAGLIP) 2.5-500 MG tablet    metFORMIN (GLUCOPHAGE) 850 MG tablet    thin (NO BRAND SPECIFIED) lancets    Other Relevant Orders    Albumin Random Urine Quantitative with Creat Ratio    Hemoglobin A1c    UA reflex to Microscopic    TSH Reflex GH    Need for prophylactic vaccination and inoculation against influenza        Relevant Orders    INFLUENZA VACCINE IM > 6 MONTHS VALENT IIV4 [43751] (Completed)    Benign essential hypertension        Relevant Medications    lisinopril (ZESTRIL) 20 MG tablet    metoprolol succinate ER (TOPROL-XL) 50 MG 24 hr tablet    spironolactone (ALDACTONE) 25 MG tablet    Other Relevant Orders    CBC with platelets    Comprehensive metabolic panel        reviewed diet, exercise and weight control, recommended sodium restriction, cardiovascular risk and specific lipid/LDL goals reviewed, specific diabetic recommendations low cholesterol diet, weight control and daily exercise discussed, use of aspirin to prevent MI and TIA's discussed  -- Expected clinical course discussed    -- Medications and  their side effects discussed    The 10-year ASCVD risk score (Lisa LAI Jr., et al., 2013) is: 9.4%    Values used to calculate the score:      Age: 54 years      Sex: Male      Is Non- : No      Diabetic: Yes      Tobacco smoker: No      Systolic Blood Pressure: 138 mmHg      Is BP treated: Yes      HDL Cholesterol: 45 mg/dL      Total Cholesterol: 142 mg/dL    Patient Instructions     Medications refilled.   Labs today.     Immunization History   Administered Date(s) Administered     Influenza (IIV3) PF 04/05/2011, 10/13/2011, 10/04/2012     Influenza Vaccine IM > 6 months Valent IIV4 10/03/2013, 10/23/2014, 10/23/2015, 10/03/2016, 10/02/2017, 10/01/2018, 09/30/2019     TD (ADULT, 7+) 07/24/1995     TDAP Vaccine (Boostrix) 07/02/2013      -- Flu shot today.     -- Get your tetanus shot updated - from one of the local pharmacies, at your convenience in 2023.     -- Get the new shingles shot (2 in series) (Shingrix) - from one of the local pharmacies, at your convenience.     Updated orders for CPAP supplies ordered today.   Contact [facility] and notify them you were seen today for CPAP supplies orders.     [facility]     [facility] -- [address], [address]    Phone: [phone]    Return for Diabetes labs and clinic follow-up appointment every 3 to 4 months.  --- (Go for about 91 to 100 days)    Aspects of Diabetes we can improve:  Hemoglobin A1c Lab Results   Component Value Date    A1C 6.7 08/21/2018    Goal range is under 8. Best is 6.5 to 7   Blood Pressure 138/88 Goal to keep less than 140/90   Tobacco  reports that he quit smoking about 26 years ago. His smoking use included cigarettes. He has a 5.00 pack-year smoking history. His smokeless tobacco use includes chew. Goal to abstain from tobacco   Eye Exam -- Do Yearly -- Annual diabetic eye exam   Healthy weight Body mass index is 32.12 kg/m . Goal BMI under 30, best is  under 25.      -- Trying to exercise daily (goal at least 20 min/day) with moderate aerobic activity   -- Eat healthy (resources from ADA at http://www.diabetes.org/)   -- Taking good care of my feet. Consider seeing the Podiatrist   -- Check blood sugars as directed, record in log book and bring to every appointment      Schedule lab only appointment --- A few days AFTER: 12/16/20   Schedule clinic appointment with Dr. Celeste -- Same day as labs, or 1-2 days later.     Insurance companies are now grading you and I on your blood sugar control -- Goal is to get your A1c down to 7.9% or lower and NO Smoking!    -- Medicare and most insurance companies, will only cover Hemoglobin A1c labs to be rechecked every 91+ days.      Hemoglobin A1C   Date Value Ref Range Status   08/21/2018 6.7 (H) 4.0 - 6.0 % Final       Next follow-up appointment with Dr. Celeste should be scheduled:  -- Approximately a few days AFTER: 12/16/20       Boo Celeste MD   Internal Medicine  [facility]     Portions of this note were dictated using speech recognition software. The note has been proofread but errors in the text may have been overlooked. Please contact me if there are any concerns regarding the accuracy of the dictation.

## 2020-09-17 NOTE — PATIENT INSTRUCTIONS
Medications refilled.   Labs today.     Immunization History   Administered Date(s) Administered     Influenza (IIV3) PF 04/05/2011, 10/13/2011, 10/04/2012     Influenza Vaccine IM > 6 months Valent IIV4 10/03/2013, 10/23/2014, 10/23/2015, 10/03/2016, 10/02/2017, 10/01/2018, 09/30/2019     TD (ADULT, 7+) 07/24/1995     TDAP Vaccine (Boostrix) 07/02/2013      -- Flu shot today.     -- Get your tetanus shot updated - from one of the local pharmacies, at your convenience in 2023.     -- Get the new shingles shot (2 in series) (Shingrix) - from one of the local pharmacies, at your convenience.     Updated orders for CPAP supplies ordered today.   Contact [facility] and notify them you were seen today for CPAP supplies orders.     [facility]     [facility] -- [address], [address]    Phone: [phone]      Blood pressure checks at home - check some in AM, some in Afternoon, some in Evening and record   -- bring these with you to your next appointment.     Goal blood pressures -- less than 140 and less than 90.    -- Ideally would like the numbers about 110-130 and 70-80.  -- If running higher or lower than this on regular basis, will need to adjust your medications.        Return for Diabetes labs and clinic follow-up appointment every 3 to 4 months.  --- (Go for about 91 to 100 days)    Aspects of Diabetes we can improve:  Hemoglobin A1c Lab Results   Component Value Date    A1C 6.7 08/21/2018    Goal range is under 8. Best is 6.5 to 7   Blood Pressure 138/88 Goal to keep less than 140/90   Tobacco  reports that he quit smoking about 26 years ago. His smoking use included cigarettes. He has a 5.00 pack-year smoking history. His smokeless tobacco use includes chew. Goal to abstain from tobacco   Eye Exam -- Do Yearly -- Annual diabetic eye exam   Healthy weight Body mass index is 32.12 kg/m . Goal BMI under 30, best is under 25.      -- Trying to exercise  daily (goal at least 20 min/day) with moderate aerobic activity   -- Eat healthy (resources from ADA at http://www.diabetes.org/)   -- Taking good care of my feet. Consider seeing the Podiatrist   -- Check blood sugars as directed, record in log book and bring to every appointment      Schedule lab only appointment --- A few days AFTER: 12/16/20   Schedule clinic appointment with Dr. Celeste -- Same day as labs, or 1-2 days later.     Insurance companies are now grading you and I on your blood sugar control -- Goal is to get your A1c down to 7.9% or lower and NO Smoking!    -- Medicare and most insurance companies, will only cover Hemoglobin A1c labs to be rechecked every 91+ days.      Hemoglobin A1C   Date Value Ref Range Status   08/21/2018 6.7 (H) 4.0 - 6.0 % Final       Next follow-up appointment with Dr. Celeste should be scheduled:  -- Approximately a few days AFTER: 12/16/20

## 2020-09-18 LAB
CREAT UR-MCNC: 23 MG/DL
MICROALBUMIN UR-MCNC: <5 MG/L
MICROALBUMIN/CREAT UR: NORMAL MG/G CR (ref 0–17)

## 2020-09-18 ASSESSMENT — ANXIETY QUESTIONNAIRES: GAD7 TOTAL SCORE: 0

## 2020-11-13 DIAGNOSIS — E11.9 TYPE 2 DIABETES MELLITUS WITHOUT COMPLICATION, WITHOUT LONG-TERM CURRENT USE OF INSULIN (H): ICD-10-CM

## 2020-11-13 DIAGNOSIS — E78.2 MIXED HYPERLIPIDEMIA: ICD-10-CM

## 2020-11-13 RX ORDER — SIMVASTATIN 20 MG
20 TABLET ORAL AT BEDTIME
Qty: 90 TABLET | Refills: 3 | Status: SHIPPED | OUTPATIENT
Start: 2020-11-13 | End: 2021-09-15

## 2021-03-09 ENCOUNTER — TRANSFERRED RECORDS (OUTPATIENT)
Dept: HEALTH INFORMATION MANAGEMENT | Facility: OTHER | Age: 56
End: 2021-03-09

## 2021-03-09 LAB — RETINOPATHY: NEGATIVE

## 2021-03-23 ENCOUNTER — MYC MEDICAL ADVICE (OUTPATIENT)
Dept: INTERNAL MEDICINE | Facility: OTHER | Age: 56
End: 2021-03-23

## 2021-03-25 ENCOUNTER — IMMUNIZATION (OUTPATIENT)
Dept: FAMILY MEDICINE | Facility: OTHER | Age: 56
End: 2021-03-25
Payer: COMMERCIAL

## 2021-03-25 PROCEDURE — 0001A PR COVID VAC PFIZER DIL RECON 30 MCG/0.3 ML IM: CPT

## 2021-03-25 PROCEDURE — 91300 PR COVID VAC PFIZER DIL RECON 30 MCG/0.3 ML IM: CPT

## 2021-04-14 ENCOUNTER — IMMUNIZATION (OUTPATIENT)
Dept: FAMILY MEDICINE | Facility: OTHER | Age: 56
End: 2021-04-14
Attending: FAMILY MEDICINE
Payer: COMMERCIAL

## 2021-04-14 PROCEDURE — 91300 PR COVID VAC PFIZER DIL RECON 30 MCG/0.3 ML IM: CPT

## 2021-04-14 PROCEDURE — 0002A PR COVID VAC PFIZER DIL RECON 30 MCG/0.3 ML IM: CPT

## 2021-04-25 ENCOUNTER — HEALTH MAINTENANCE LETTER (OUTPATIENT)
Age: 56
End: 2021-04-25

## 2021-05-20 ENCOUNTER — MEDICAL CORRESPONDENCE (OUTPATIENT)
Dept: HEALTH INFORMATION MANAGEMENT | Facility: OTHER | Age: 56
End: 2021-05-20

## 2021-07-17 DIAGNOSIS — I10 BENIGN ESSENTIAL HYPERTENSION: ICD-10-CM

## 2021-07-20 RX ORDER — LISINOPRIL 20 MG/1
TABLET ORAL
Qty: 90 TABLET | Refills: 3 | OUTPATIENT
Start: 2021-07-20

## 2021-07-20 NOTE — TELEPHONE ENCOUNTER
[facility] in #[id number] in [facility] [place] sent Rx request for the following:   Requested Prescriptions   Pending Prescriptions Disp Refills     lisinopril (ZESTRIL) 20 MG tablet [Pharmacy Med Name: LISINOPRIL 20 MG TABLET] 90 tablet 3     Sig: TAKE 1 TABLET BY MOUTH EVERY DAY       ACE Inhibitors (Including Combos) Protocol Passed - 7/17/2021  8:44 AM   Last Prescription Date:   9/17/2020  Last Fill Qty/Refills:         90, R-3    Last Office Visit:              9/17/2020 ([place])   Future Office visit:           None noted   Redundant refill request refused: Too soon: Patient should have refills on file.  Mariella Mejia RN ....................  7/20/2021   10:02 AM

## 2021-08-06 ENCOUNTER — ALLIED HEALTH/NURSE VISIT (OUTPATIENT)
Dept: FAMILY MEDICINE | Facility: OTHER | Age: 56
End: 2021-08-06
Attending: FAMILY MEDICINE
Payer: COMMERCIAL

## 2021-08-06 DIAGNOSIS — R53.83 FATIGUE: Primary | ICD-10-CM

## 2021-08-06 LAB — SARS-COV-2 RNA RESP QL NAA+PROBE: NEGATIVE

## 2021-08-06 PROCEDURE — C9803 HOPD COVID-19 SPEC COLLECT: HCPCS

## 2021-08-06 PROCEDURE — U0005 INFEC AGEN DETEC AMPLI PROBE: HCPCS | Mod: ZL

## 2021-09-14 DIAGNOSIS — E78.2 MIXED HYPERLIPIDEMIA: ICD-10-CM

## 2021-09-14 DIAGNOSIS — I10 BENIGN ESSENTIAL HYPERTENSION: ICD-10-CM

## 2021-09-14 DIAGNOSIS — E11.9 TYPE 2 DIABETES MELLITUS WITHOUT COMPLICATION, WITHOUT LONG-TERM CURRENT USE OF INSULIN (H): ICD-10-CM

## 2021-09-14 NOTE — LETTER
September 15, 2021      Willis Oliveira  [address] [address] [address]        Dear Willis,     A refill request was received from your pharmacy for Lisinopril.    Additional refills require an office visit with Dr. Celeste for annual review and labs.    Please call [phone] to schedule an appointment.          Sincerely,    Refill Nurse

## 2021-09-15 RX ORDER — SIMVASTATIN 20 MG
20 TABLET ORAL AT BEDTIME
Qty: 90 TABLET | Refills: 0 | Status: SHIPPED | OUTPATIENT
Start: 2021-09-15 | End: 2021-12-01

## 2021-09-15 RX ORDER — SPIRONOLACTONE 25 MG/1
25 TABLET ORAL DAILY
Qty: 90 TABLET | Refills: 0 | Status: SHIPPED | OUTPATIENT
Start: 2021-09-15 | End: 2021-12-01

## 2021-09-15 RX ORDER — LISINOPRIL 20 MG/1
TABLET ORAL
Qty: 90 TABLET | Refills: 0 | Status: SHIPPED | OUTPATIENT
Start: 2021-09-15 | End: 2021-12-01

## 2021-09-15 RX ORDER — METOPROLOL SUCCINATE 50 MG/1
50 TABLET, EXTENDED RELEASE ORAL DAILY
Qty: 90 TABLET | Refills: 0 | Status: SHIPPED | OUTPATIENT
Start: 2021-09-15 | End: 2021-12-01

## 2021-09-15 RX ORDER — GLIPIZIDE AND METFORMIN HCL 2.5; 5 MG/1; MG/1
1 TABLET, FILM COATED ORAL
Qty: 180 TABLET | Refills: 0 | Status: SHIPPED | OUTPATIENT
Start: 2021-09-15 | End: 2021-12-01

## 2021-09-15 NOTE — TELEPHONE ENCOUNTER
Routing refill request to provider for review/approval because:  LOV and labs 9/17/2020  Patient due for annual review and labs.    Letter and my chart message sent.    Sandra Frey RN on 9/15/2021 at 12:35 PM

## 2021-09-15 NOTE — TELEPHONE ENCOUNTER
Overdue for Annual physical.     Please call patient and schedule.  Okay to use 4 PM -- 40 minute spot at the end of the day if needed.    We can send in a small Prescription refill if needed to get to appointment.     Boo Celeste MD

## 2021-09-15 NOTE — TELEPHONE ENCOUNTER
Patient has been working long hours everyday and work wont slow down until November. Can he get refills to get him by until then? Refills pended.    Mariella Corey LPN on 9/15/2021 at 3:31 PM

## 2021-10-09 ENCOUNTER — HEALTH MAINTENANCE LETTER (OUTPATIENT)
Age: 56
End: 2021-10-09

## 2021-12-01 ENCOUNTER — OFFICE VISIT (OUTPATIENT)
Dept: INTERNAL MEDICINE | Facility: OTHER | Age: 56
End: 2021-12-01
Attending: INTERNAL MEDICINE
Payer: COMMERCIAL

## 2021-12-01 VITALS
TEMPERATURE: 97.2 F | DIASTOLIC BLOOD PRESSURE: 82 MMHG | OXYGEN SATURATION: 99 % | BODY MASS INDEX: 32.92 KG/M2 | SYSTOLIC BLOOD PRESSURE: 138 MMHG | HEART RATE: 73 BPM | RESPIRATION RATE: 16 BRPM | WEIGHT: 227.8 LBS

## 2021-12-01 DIAGNOSIS — Z71.85 VACCINE COUNSELING: ICD-10-CM

## 2021-12-01 DIAGNOSIS — G47.33 OSA ON CPAP: ICD-10-CM

## 2021-12-01 DIAGNOSIS — I10 BENIGN ESSENTIAL HYPERTENSION: ICD-10-CM

## 2021-12-01 DIAGNOSIS — E66.01 MORBID OBESITY (H): ICD-10-CM

## 2021-12-01 DIAGNOSIS — Z23 NEED FOR PROPHYLACTIC VACCINATION AND INOCULATION AGAINST INFLUENZA: ICD-10-CM

## 2021-12-01 DIAGNOSIS — E78.2 MIXED HYPERLIPIDEMIA: ICD-10-CM

## 2021-12-01 DIAGNOSIS — E11.9 TYPE 2 DIABETES MELLITUS WITHOUT COMPLICATION, WITHOUT LONG-TERM CURRENT USE OF INSULIN (H): Primary | ICD-10-CM

## 2021-12-01 DIAGNOSIS — Z00.00 ANNUAL PHYSICAL EXAM: ICD-10-CM

## 2021-12-01 LAB
ALBUMIN UR-MCNC: NEGATIVE MG/DL
APPEARANCE UR: CLEAR
BILIRUB UR QL STRIP: NEGATIVE
CHOLEST SERPL-MCNC: 132 MG/DL
COLOR UR AUTO: YELLOW
CREAT UR-MCNC: 28 MG/DL
ERYTHROCYTE [DISTWIDTH] IN BLOOD BY AUTOMATED COUNT: 11.6 % (ref 10–15)
FASTING STATUS PATIENT QL REPORTED: YES
GLUCOSE UR STRIP-MCNC: NEGATIVE MG/DL
HBA1C MFR BLD: 6.8 % (ref 4–6.2)
HCT VFR BLD AUTO: 41 % (ref 40–53)
HDLC SERPL-MCNC: 44 MG/DL (ref 23–92)
HGB BLD-MCNC: 14.2 G/DL (ref 13.3–17.7)
HGB UR QL STRIP: NEGATIVE
KETONES UR STRIP-MCNC: NEGATIVE MG/DL
LDLC SERPL CALC-MCNC: 46 MG/DL
LEUKOCYTE ESTERASE UR QL STRIP: NEGATIVE
MCH RBC QN AUTO: 31.3 PG (ref 26.5–33)
MCHC RBC AUTO-ENTMCNC: 34.6 G/DL (ref 31.5–36.5)
MCV RBC AUTO: 90 FL (ref 78–100)
MICROALBUMIN UR-MCNC: 5 MG/L
MICROALBUMIN/CREAT UR: 17.86 MG/G CR (ref 0–17)
NITRATE UR QL: NEGATIVE
NONHDLC SERPL-MCNC: 88 MG/DL
PH UR STRIP: 6.5 [PH] (ref 5–9)
PLATELET # BLD AUTO: 275 10E3/UL (ref 150–450)
RBC # BLD AUTO: 4.54 10E6/UL (ref 4.4–5.9)
SP GR UR STRIP: 1 (ref 1–1.03)
TRIGL SERPL-MCNC: 211 MG/DL
TSH SERPL DL<=0.005 MIU/L-ACNC: 1.4 MU/L (ref 0.4–4)
UROBILINOGEN UR STRIP-MCNC: NORMAL MG/DL
WBC # BLD AUTO: 7.5 10E3/UL (ref 4–11)

## 2021-12-01 PROCEDURE — 36415 COLL VENOUS BLD VENIPUNCTURE: CPT | Mod: ZL | Performed by: INTERNAL MEDICINE

## 2021-12-01 PROCEDURE — 80061 LIPID PANEL: CPT | Mod: ZL | Performed by: INTERNAL MEDICINE

## 2021-12-01 PROCEDURE — 99396 PREV VISIT EST AGE 40-64: CPT | Mod: 25 | Performed by: INTERNAL MEDICINE

## 2021-12-01 PROCEDURE — 82043 UR ALBUMIN QUANTITATIVE: CPT | Mod: ZL | Performed by: INTERNAL MEDICINE

## 2021-12-01 PROCEDURE — 83036 HEMOGLOBIN GLYCOSYLATED A1C: CPT | Mod: ZL | Performed by: INTERNAL MEDICINE

## 2021-12-01 PROCEDURE — 99213 OFFICE O/P EST LOW 20 MIN: CPT | Mod: 25 | Performed by: INTERNAL MEDICINE

## 2021-12-01 PROCEDURE — 90682 RIV4 VACC RECOMBINANT DNA IM: CPT | Performed by: INTERNAL MEDICINE

## 2021-12-01 PROCEDURE — 84443 ASSAY THYROID STIM HORMONE: CPT | Mod: ZL | Performed by: INTERNAL MEDICINE

## 2021-12-01 PROCEDURE — 81003 URINALYSIS AUTO W/O SCOPE: CPT | Mod: ZL | Performed by: INTERNAL MEDICINE

## 2021-12-01 PROCEDURE — 85027 COMPLETE CBC AUTOMATED: CPT | Mod: ZL | Performed by: INTERNAL MEDICINE

## 2021-12-01 PROCEDURE — 90471 IMMUNIZATION ADMIN: CPT | Performed by: INTERNAL MEDICINE

## 2021-12-01 RX ORDER — SPIRONOLACTONE 25 MG/1
25 TABLET ORAL DAILY
Qty: 90 TABLET | Refills: 1 | Status: SHIPPED | OUTPATIENT
Start: 2021-12-01 | End: 2022-06-02

## 2021-12-01 RX ORDER — GLIPIZIDE AND METFORMIN HCL 2.5; 5 MG/1; MG/1
1 TABLET, FILM COATED ORAL
Qty: 180 TABLET | Refills: 1 | Status: SHIPPED | OUTPATIENT
Start: 2021-12-01 | End: 2022-06-02

## 2021-12-01 RX ORDER — SIMVASTATIN 20 MG
20 TABLET ORAL AT BEDTIME
Qty: 90 TABLET | Refills: 1 | Status: SHIPPED | OUTPATIENT
Start: 2021-12-01 | End: 2022-06-02

## 2021-12-01 RX ORDER — METOPROLOL SUCCINATE 50 MG/1
50 TABLET, EXTENDED RELEASE ORAL DAILY
Qty: 90 TABLET | Refills: 1 | Status: SHIPPED | OUTPATIENT
Start: 2021-12-01 | End: 2022-06-02

## 2021-12-01 RX ORDER — LISINOPRIL 20 MG/1
20 TABLET ORAL DAILY
Qty: 90 TABLET | Refills: 1 | Status: SHIPPED | OUTPATIENT
Start: 2021-12-01 | End: 2022-06-02

## 2021-12-01 ASSESSMENT — ENCOUNTER SYMPTOMS
APNEA: 1
VOMITING: 0
SHORTNESS OF BREATH: 0
WHEEZING: 0
ARTHRALGIAS: 0
COUGH: 0
WOUND: 0
DYSURIA: 0
FEVER: 0
PALPITATIONS: 0
ABDOMINAL PAIN: 0
DIZZINESS: 0
CHILLS: 0
HEMATURIA: 0
NAUSEA: 0
DIARRHEA: 0
AGITATION: 0
MYALGIAS: 0
LIGHT-HEADEDNESS: 0
BRUISES/BLEEDS EASILY: 0
HEADACHES: 1
CONFUSION: 0

## 2021-12-01 ASSESSMENT — PAIN SCALES - GENERAL: PAINLEVEL: NO PAIN (0)

## 2021-12-01 NOTE — LETTER
December 6, 2021      Willis Oliveira  [address] [address] [address]        Dear ,    We are writing to inform you of your test results.    Labs are stable.   Continue current medications.   Plan to recheck labs again in 3 to 4 months.     Electronically signed by:  Boo Celeste MD  on December 6, 2021     Resulted Orders   Lipid Profile   Result Value Ref Range    Cholesterol 132 <200 mg/dL    Triglycerides 211 (H) <150 mg/dL    Direct Measure HDL 44 23 - 92 mg/dL    LDL Cholesterol Calculated 46 <=100 mg/dL    Non HDL Cholesterol 88 <130 mg/dL    Patient Fasting > 8hrs? Yes     Narrative    Cholesterol  Desirable:  <200 mg/dL    Triglycerides  Normal:  Less than 150 mg/dL  Borderline High:  150-199 mg/dL  High:  200-499 mg/dL  Very High:  Greater than or equal to 500 mg/dL    Direct Measure HDL  Female:  Greater than or equal to 50 mg/dL   Male:  Greater than or equal to 40 mg/dL    LDL Cholesterol  Desirable:  <100mg/dL  Above Desirable:  100-129 mg/dL   Borderline High:  130-159 mg/dL   High:  160-189 mg/dL   Very High:  >= 190 mg/dL    Non HDL Cholesterol  Desirable:  130 mg/dL  Above Desirable:  130-159 mg/dL  Borderline High:  160-189 mg/dL  High:  190-219 mg/dL  Very High:  Greater than or equal to 220 mg/dL   Albumin Random Urine Quantitative with Creat Ratio   Result Value Ref Range    Creatinine Urine mg/dL 28 mg/dL    Albumin Urine mg/L 5 mg/L    Albumin Urine mg/g Cr 17.86 (H) 0.00 - 17.00 mg/g Cr   CBC with platelets   Result Value Ref Range    WBC Count 7.5 4.0 - 11.0 10e3/uL    RBC Count 4.54 4.40 - 5.90 10e6/uL    Hemoglobin 14.2 13.3 - 17.7 g/dL    Hematocrit 41.0 40.0 - 53.0 %    MCV 90 78 - 100 fL    MCH 31.3 26.5 - 33.0 pg    MCHC 34.6 31.5 - 36.5 g/dL    RDW 11.6 10.0 - 15.0 %    Platelet Count 275 150 - 450 10e3/uL   Hemoglobin A1c   Result Value Ref Range    Hemoglobin A1C 6.8 (H) 4.0 - 6.2 %   TSH with free T4 reflex   Result Value Ref Range    TSH 1.40 0.40 - 4.00  mU/L   UA reflex to Microscopic   Result Value Ref Range    Color Urine Yellow Colorless, Straw, Light Yellow, Yellow    Appearance Urine Clear Clear    Glucose Urine Negative Negative mg/dL    Bilirubin Urine Negative Negative    Ketones Urine Negative Negative mg/dL    Specific Gravity Urine 1.005 1.000 - 1.030    Blood Urine Negative Negative    pH Urine 6.5 5.0 - 9.0    Protein Albumin Urine Negative Negative mg/dL    Urobilinogen Urine Normal Normal, 2.0 mg/dL    Nitrite Urine Negative Negative    Leukocyte Esterase Urine Negative Negative    Narrative    Microscopic not indicated       If you have any questions or concerns, please call the clinic at the number listed above.       Sincerely,      Boo Celeste MD

## 2021-12-01 NOTE — PATIENT INSTRUCTIONS
Blood pressure is controlled.   Diabetes has been well controlled. -- labs pending.     Medications refilled.     Updated orders for CPAP supplies ordered today.   Contact [facility] and notify them you were seen today for CPAP supplies orders.     [facility]     [facility] Knight -- [address], [address]    Phone: [phone]       Immunization History   Administered Date(s) Administered     COVID-19,PF,Pfizer (12+ Yrs) 03/25/2021, 04/14/2021     Influenza (IIV3) PF 04/05/2011, 10/13/2011, 10/04/2012     Influenza Vaccine IM > 6 months Valent IIV4 (Alfuria,Fluzone) 10/03/2013, 10/23/2014, 10/23/2015, 10/03/2016, 10/02/2017, 10/01/2018, 09/30/2019, 09/17/2020     TD (ADULT, 7+) 07/24/1995     TDAP Vaccine (Boostrix) 07/02/2013      -- The COVID-19 Booster shots are here.  (Does not have to be the same vaccine as before).    -- Moderna and Pfizer 3rd shots:   - If immunocompromised, can get 3rd shot -- 28+ days from date of 2nd shot.   - Otherwise - vaccine schedule is to get 3rd shot -- about 6 months from date of 2nd shot.     -- Consider Annual Flu / Influenza vaccination - Every Fall (Starting about October 1st)    -- Flu shot today.     -- Get your tetanus shot updated - from one of the local pharmacies, at your convenience or the Local Public Health Department. -- After 07/02/2023.    -- Get the new shingles shot (2 in series) (Shingrix) - from one of the local pharmacies, at your convenience. -- Check cost/coverage.   -- or -- If these are very expensive, go to the Local Public Health Department     [facility] -- [address] [address]    ---- Shot clinic is the FIRST Thursday of Each Month -- from 2 to 6 PM, by Appointment only.     Call for an appointment -- [phone]          Aspects of Diabetes:   Recent Labs   Lab Test 09/17/20  1708 04/15/19  0802 08/21/18  0831  05/08/17  1710 05/08/17  1641   A1C 6.3*  --  6.7*  --   --    LDL 87  --  67 57  --    HDL 59  --  45 45  --    TRIG 105  --  151* 127  --    ALT 28 15 25  --   --    CR 0.94 1.05 0.91  --   --    GFRESTIMATED 84 74 88  --    < >   GFRESTBLACK >90 89 >90  --   --    POTASSIUM 3.8 4.1 4.2  --   --     < > = values in this interval not displayed.      Hemoglobin A1c  Goal range is under 8%. Best is 6.5 to 7   Blood Pressure 138/82 Goal to keep less than 140/90   Tobacco  reports that he quit smoking about 27 years ago. His smoking use included cigarettes. He has a 5.00 pack-year smoking history. His smokeless tobacco use includes chew. Goal to abstain from tobacco   Aspirin or Plavix Anti-platelet therapy Aspirin or Plavix reduces risk of heart disease and stroke  -- sometimes used with other blood thinners, depending on bleeding risk and risk factors.    ACE/ARB Specific blood pressure meds These medications can reduce risk of kidney disease   Cholesterol Statins (Lipitor, Crestor, vs others) Statins reduce risk of heart disease and stroke   Eye Exam -- Do Yearly -- Annual diabetic eye exam   Healthy weight Wt Readings from Last 3 Encounters:   12/01/21 103.3 kg (227 lb 12.8 oz)   09/17/20 100.8 kg (222 lb 4 oz)   04/15/19 99.8 kg (220 lb)     Body mass index is 32.92 kg/m .  Goal BMI under 30, best is under 25.      -- Trying to exercise daily (goal at least 20 min/day) with moderate aerobic activity   -- Eat healthy (resources from ADA at http://www.diabetes.org/)   -- Taking good care of my feet. Consider seeing the Podiatrist   -- Check blood sugars as directed, record in log book and bring to every appointment    Insurance companies are grading you and I on your blood sugar control -- Goal is to get your A1c down to 7.9% or lower and NO Smoking!  -- Medicare and most insurance companies, will only cover Hemoglobin A1c labs to be rechecked every 91+ days.      Return for Diabetes labs and clinic follow-up  "appointment every 3 to 4 months.    Schedule lab only appointment --- A few days AFTER: 3/1/22   Schedule clinic appointment with Dr. Celeste -- Same day as labs, or 1-2 days later.          Your Body mass index (BMI) is:  Estimated body mass index is 32.92 kg/m  as calculated from the following:    Height as of 9/17/20: 1.772 m (5' 9.75\").    Weight as of this encounter: 103.3 kg (227 lb 12.8 oz).   (BMI ranges: Normal 18.5 - 25, Overweight 25 - 30, Obesity greater than 30)     Facts about losing weight:   -- Overweight and Obesity increase your risk for developing diabetes, high blood pressure and stroke, and shorten your life.   -- 90% of weight loss comes from dietary changes, only 10% from exercise   -- For every 1 pound of of weight loss -- this is equal to about 8 to 10 pounds of weight off of your knees.   -- there are about 3500 calories in 1 pound of body weight.     -- Goal Caloric intake -- 1500 to 1700 calories daily.     Get out and exercise, bike ride, walk for 10 to 15 minutes after each meal -- this can significantly lowers the spike in blood sugar after eating.     To help with weight loss and improve blood sugar control....    -- Try to avoid Carbohydrates as much as possible -- breads, pasta, baked goods, cakes, oatmeal, cold cereal, potatoes.   -- Eat more lean meats, proteins, eggs, nuts, vegetables.    -- Start or Continue regular daily exercise.      -- Eat more lean meats, proteins, eggs, nuts, vegetables.      What have successful people done to lose large amounts of weight, and keep it off?   -- Used both diet and exercise to lose weight   -- Ate a healthy breakfast every day   -- Exercised an hour per day   -- Watched less than 10 hours of TV per week   -- Weighed themselves weekly   -- Drink a large glass of water 10-15 minutes before your meals.   -- Use smaller dinner plates and don't go back for seconds.     What should I do?   -- Work on 5-10% weight loss   -- Focus on a few healthy " dietary changes   -- Eat more fresh fruits and vegetables, and fewer carbohydrates (http://myplate.gov/)   -- Exercise by some means -- every day   -- Weigh yourself once a week    Weight Management   Weight Watchers     Meets Mondays 9:30, 11:45, or 5:30    James Magana, [address], [place], [place]     Contact Wendy [phone] [email or web address]  Weight Watchers [email or web address]    -- Consider My Fitness Pal on your smart phone  [email or web address]

## 2021-12-01 NOTE — NURSING NOTE
"Chief Complaint   Patient presents with     Physical     Imm/Inj     Flu Shot       FOOD SECURITY SCREENING QUESTIONS  Hunger Vital Signs:  Within the past 12 months we worried whether our food would run out before we got money to buy more. Never  Within the past 12 months the food we bought just didn't last and we didn't have money to get more. Never  Kellie Swenson LPN 12/1/2021 10:16 AM      Initial BP (!) 150/78 (BP Location: Right arm, Patient Position: Sitting, Cuff Size: Adult Large)   Pulse 73   Temp 97.2  F (36.2  C) (Tympanic)   Resp 16   Wt 103.3 kg (227 lb 12.8 oz)   SpO2 99%   BMI 32.92 kg/m   Estimated body mass index is 32.92 kg/m  as calculated from the following:    Height as of 9/17/20: 1.772 m (5' 9.75\").    Weight as of this encounter: 103.3 kg (227 lb 12.8 oz).  Medication Reconciliation: complete    Kellie Swenson LPN  "

## 2021-12-01 NOTE — PROGRESS NOTES
SUBJECTIVE:   CC: Willis Oliveira is an 56 year old male who presents for preventative health visit.     Patient has been advised of split billing requirements and indicates understanding: Yes  Healthy Habits:    Bi-annual eye exam:  Yes    Dental care twice a year:  Yes    Sleep apnea or symptoms of sleep apnea:  Sleep apnea    Diet:  Diabetic    Frequency of exercise:  4-5 days/week (rides stationary bike about every other day)    Duration of exercise:  15-30 minutes    Taking medications regularly:  Yes    Barriers to taking medications:  None    Medication side effects:  None    PHQ-2 Total Score:    Additional concerns today:  Yes      Today's PHQ-2 Score:   PHQ-2 (  Pfizer) 2021   Q1: Little interest or pleasure in doing things 0   Q2: Feeling down, depressed or hopeless 0   PHQ-2 Score 0   PHQ-2 Total Score (12-17 Years)- Positive if 3 or more points; Administer PHQ-A if positive -       Abuse: Current or Past(Physical, Sexual or Emotional)- No  Do you feel safe in your environment? Yes    Have you ever done Advance Care Planning? (For example, a Health Directive, POLST, or a discussion with a medical provider or your loved ones about your wishes): No, advance care planning information given to patient to review.  Patient declined advance care planning discussion at this time.    Social History     Tobacco Use     Smoking status: Former Smoker     Packs/day: 0.50     Years: 10.00     Pack years: 5.00     Types: Cigarettes     Quit date: 1994     Years since quittin.9     Smokeless tobacco: Current User     Types: Chew     Tobacco comment:  tin lasts a week, 30 years   Substance Use Topics     Alcohol use: Yes     Alcohol/week: 0.0 standard drinks     Comment: occasional     No flowsheet data found.    Last PSA: No results found for: PSA    Reviewed orders with patient. Reviewed health maintenance and updated orders accordingly - Yes    Reviewed and updated as needed this visit by clinical  staff  Tobacco  Allergies  Meds  Problems  Med Hx  Surg Hx  Fam Hx  Soc Hx         Reviewed and updated as needed this visit by Provider  Tobacco  Allergies  Meds  Problems  Med Hx  Surg Hx  Fam Hx          Review of Systems   Constitutional: Negative for chills and fever.   HENT: Negative for congestion and hearing loss.    Eyes: Negative for visual disturbance.   Respiratory: Positive for apnea (Uses CPAP regularly, finds helpful and beneficial). Negative for cough, shortness of breath and wheezing.    Cardiovascular: Negative for chest pain and palpitations.   Gastrointestinal: Negative for abdominal pain, diarrhea, nausea and vomiting.   Endocrine: Negative for cold intolerance and heat intolerance.   Genitourinary: Negative for dysuria and hematuria.        + mild increased urinary frequency, can't hold it as long   Musculoskeletal: Negative for arthralgias and myalgias.        + some chronic foot pain   Skin: Negative for rash and wound.   Allergic/Immunologic: Negative for immunocompromised state.   Neurological: Positive for headaches (rare, in temples). Negative for dizziness and light-headedness.   Hematological: Does not bruise/bleed easily.   Psychiatric/Behavioral: Negative for agitation and confusion.       OBJECTIVE:   /82 (BP Location: Right arm, Patient Position: Sitting, Cuff Size: Adult Large)   Pulse 73   Temp 97.2  F (36.2  C) (Tympanic)   Resp 16   Wt 103.3 kg (227 lb 12.8 oz)   SpO2 99%   BMI 32.92 kg/m      Physical Exam  Constitutional:       General: He is not in acute distress.     Appearance: He is well-developed. He is not diaphoretic.   HENT:      Head: Normocephalic and atraumatic.   Eyes:      General: No scleral icterus.     Conjunctiva/sclera: Conjunctivae normal.   Neck:      Vascular: No carotid bruit.   Cardiovascular:      Rate and Rhythm: Normal rate and regular rhythm.      Pulses: Normal pulses.   Pulmonary:      Effort: Pulmonary effort is normal.       Breath sounds: Normal breath sounds.   Abdominal:      Palpations: Abdomen is soft.      Tenderness: There is no abdominal tenderness.   Musculoskeletal:         General: No deformity.      Cervical back: Neck supple.      Right lower leg: No edema.      Left lower leg: No edema.   Lymphadenopathy:      Cervical: No cervical adenopathy.   Skin:     General: Skin is warm and dry.      Coloration: Skin is not jaundiced.      Findings: No rash.   Neurological:      Mental Status: He is alert and oriented to person, place, and time. Mental status is at baseline.   Psychiatric:         Mood and Affect: Mood normal.         Behavior: Behavior normal.         Diagnostic Test Results:  Labs reviewed in [facility]  Recent Labs   Lab Test 12/01/21  1059 09/17/20  1708 04/15/19  0802 08/21/18  0831   A1C 6.8* 6.3*  --  6.7*   LDL 46 87  --  67   HDL 44 59  --  45   TRIG 211* 105  --  151*   ALT  --  28 15 25   CR  --  0.94 1.05 0.91   GFRESTIMATED  --  84 74 88   GFRESTBLACK  --  >90 89 >90   POTASSIUM  --  3.8 4.1 4.2   TSH 1.40  --   --   --    Hemoglobin A1c is up but still within goal range.  LDL is at goal.  HDL is at goal.  Triglycerides are high and not currently at goal.  Last creatinine level was normal.  TSH is normal today.    ASSESSMENT/PLAN:       ICD-10-CM    1. Type 2 diabetes mellitus without complication, without long-term current use of insulin (H)  E11.9 UA reflex to Microscopic     TSH with free T4 reflex     Hemoglobin A1c     CBC with platelets     Albumin Random Urine Quantitative with Creat Ratio     Comprehensive metabolic panel     glipiZIDE-metFORMIN (METAGLIP) 2.5-500 MG tablet     metFORMIN (GLUCOPHAGE) 500 MG tablet     Comprehensive metabolic panel     Albumin Random Urine Quantitative with Creat Ratio     CBC with platelets     Hemoglobin A1c     TSH with free T4 reflex     UA reflex to Microscopic   2. Mixed hyperlipidemia  E78.2 Lipid Profile     simvastatin (ZOCOR) 20 MG tablet     Lipid  Profile   3. Annual physical exam  Z00.00    4. Need for prophylactic vaccination and inoculation against influenza  Z23    5. Benign essential hypertension  I10 lisinopril (ZESTRIL) 20 MG tablet     spironolactone (ALDACTONE) 25 MG tablet     metoprolol succinate ER (TOPROL-XL) 50 MG 24 hr tablet   6. REKHA on CPAP - uses daily, finds helpful and beneficial  G47.33 CPAP Order for DME - ONLY FOR DME    Z99.89    7. Morbid obesity (H)  E66.01    8. Vaccine counseling  Z71.85    Patient presents for annual physical examination, as well as follow-up multiple issues.    Mixed hyperlipidemia, triglycerides are high but his HDL and LDL are both at goal.  Encouraged regular exercise, reduced oral carbohydrate intake.  Weight loss.  If this does not improve his cholesterol panel, would consider adding fibrate or even increasing simvastatin dosing.    Hypertension, blood pressure initially was elevated.  Recheck did improve.  Advised to monitor at home.  If blood pressure remains elevated we will need to make medication changes.  Continue lisinopril, spironolactone, metoprolol for now.  No dose changes for now.    Sleep apnea, chronic, ongoing.  Uses CPAP nightly.  Finds helpful and beneficial.  Encouraged continued use.  CPAP routed.  Seed updating today.  Orders he was advised to call the medical supply store in [place] and let them know this was dealt with today.    Obesity, discussed need for reduced oral caloric intake.  Regular exercise.  Reduce carbohydrate intake.  Information printed and reviewed.    Type 2 diabetes, currently well controlled.  Taking Metformin plus Metformin-glipizide combo.  Doing well with current dosing.  Denies hypoglycemia.  Needs medication refills.  Check labs.    Vaccine counseling completed.  He would like flu shot today.  Consider shingles vaccination.    --Consider lung cancer screening CT if patient were to qualify.    Patient has been advised of split billing requirements and indicates  "understanding: Yes  COUNSELING:   Reviewed preventive health counseling, as reflected in patient instructions  Special attention given to:        Regular exercise       Healthy diet/nutrition       Vision screening       Hearing screening       Immunizations       Aspirin prophylaxis     Estimated body mass index is 32.92 kg/m  as calculated from the following:    Height as of 9/17/20: 1.772 m (5' 9.75\").    Weight as of this encounter: 103.3 kg (227 lb 12.8 oz).     Weight management plan: Discussed healthy diet and exercise guidelines    He reports that he quit smoking about 27 years ago. His smoking use included cigarettes. He has a 5.00 pack-year smoking history. His smokeless tobacco use includes chew.      Counseling Resources:  ATP IV Guidelines  Pooled Cohorts Equation Calculator  FRAX Risk Assessment  ICSI Preventive Guidelines  Dietary Guidelines for Americans, 2010  USDA's MyPlate  ASA Prophylaxis  Lung CA Screening    Boo Celeste MD  [facility]  "

## 2022-06-01 DIAGNOSIS — E11.9 TYPE 2 DIABETES MELLITUS WITHOUT COMPLICATION, WITHOUT LONG-TERM CURRENT USE OF INSULIN (H): ICD-10-CM

## 2022-06-01 DIAGNOSIS — I10 BENIGN ESSENTIAL HYPERTENSION: ICD-10-CM

## 2022-06-02 RX ORDER — GLIPIZIDE AND METFORMIN HCL 2.5; 5 MG/1; MG/1
TABLET, FILM COATED ORAL
Qty: 180 TABLET | Refills: 0 | Status: SHIPPED | OUTPATIENT
Start: 2022-06-02 | End: 2022-09-01

## 2022-06-02 RX ORDER — SPIRONOLACTONE 25 MG/1
TABLET ORAL
Qty: 90 TABLET | Refills: 0 | Status: SHIPPED | OUTPATIENT
Start: 2022-06-02 | End: 2022-09-01

## 2022-06-02 RX ORDER — LISINOPRIL 20 MG/1
TABLET ORAL
Qty: 90 TABLET | Refills: 0 | Status: SHIPPED | OUTPATIENT
Start: 2022-06-02 | End: 2022-09-01

## 2022-06-02 RX ORDER — METOPROLOL SUCCINATE 50 MG/1
TABLET, EXTENDED RELEASE ORAL
Qty: 90 TABLET | Refills: 0 | Status: SHIPPED | OUTPATIENT
Start: 2022-06-02 | End: 2022-09-01

## 2022-06-02 NOTE — TELEPHONE ENCOUNTER
[id number] [place] sent Rx request for the following:      Requested Prescriptions   Pending Prescriptions Disp Refills     metoprolol succinate ER (TOPROL XL) 50 MG 24 hr tablet [Pharmacy Med Name: METOPROLOL SUCC ER 50 MG TAB] 90 tablet 1     Sig: TAKE 1 TABLET BY MOUTH EVERY DAY   Last Prescription Date:   12/1/21  Last Fill Qty/Refills:         90, R-1         metFORMIN (GLUCOPHAGE) 500 MG tablet [Pharmacy Med Name: METFORMIN  MG TABLET] 180 tablet 1     Sig: TAKE 1 TAB BY MOUTH 2X DAILY WITH MEALS. TAKE WITH COMBO GLIPIZIDE-METFORMIN 2X A DAY   Last Prescription Date:   12/1/21  Last Fill Qty/Refills:         180, R-1      Biguanide Agents Failed - 6/2/2022  9:27 AM        Failed - Patient has documented A1c within the specified period of time.     If HgbA1C is 8 or greater, it needs to be on file within the past 3 months.  If less than 8, must be on file within the past 6 months.     Recent Labs   Lab Test 12/01/21  1059 08/21/18  0831 05/08/17  1627   A1C 6.8*   < >  --    [id number]  --   --  6.1    < > = values in this interval not displayed.           Failed - Patient's CR is NOT>1.4 OR Patient's EGFR is NOT<45 within past 12 mos.     Recent Labs   Lab Test 09/17/20  1708   GFRESTIMATED 84   GFRESTBLACK >90       Recent Labs   Lab Test 09/17/20  1708   CR 0.94           Failed - Recent (6 mo) or future (30 days) visit within the authorizing provider's specialty        spironolactone (ALDACTONE) 25 MG tablet [Pharmacy Med Name: SPIRONOLACTONE 25 MG TABLET] 90 tablet 1     Sig: TAKE 1 TABLET BY MOUTH EVERY DAY   Last Prescription Date:   12/1/21  Last Fill Qty/Refills:         90, R-1      Diuretics (Including Combos) Protocol Failed - 6/2/2022  9:27 AM        Failed - Normal serum creatinine on file in past 12 months     Recent Labs   Lab Test 09/17/20  1708   CR 0.94           Failed - Normal serum potassium on file in past 12 months     Recent Labs   Lab Test 09/17/20  1708    POTASSIUM 3.8           Failed - Normal serum sodium on file in past 12 months     Recent Labs   Lab Test 09/17/20  1708              lisinopril (ZESTRIL) 20 MG tablet [Pharmacy Med Name: LISINOPRIL 20 MG TABLET] 90 tablet 1     Sig: TAKE 1 TABLET BY MOUTH EVERY DAY   Last Prescription Date:   12/1/21  Last Fill Qty/Refills:         90, R-1      ACE Inhibitors (Including Combos) Protocol Failed - 6/2/2022  9:27 AM        Failed - Normal serum creatinine on file in past 12 months     Recent Labs   Lab Test 09/17/20  1708   CR 0.94           Failed - Normal serum potassium on file in past 12 months     Recent Labs   Lab Test 09/17/20  1708   POTASSIUM 3.8           glipiZIDE-metFORMIN (METAGLIP) 2.5-500 MG tablet [Pharmacy Med Name: GLIPIZIDE-METFORMIN 2.5-500 MG] 180 tablet 1     Sig: TAKE 1 TABLET BY MOUTH TWICE A DAY BEFORE MEALS   Last Prescription Date:   12/1/21  Last Fill Qty/Refills:         180, R-1      Combination Oral Antihyperglycemic Agents Failed - 6/2/2022  9:27 AM        Failed - Patient has documented A1c within the specified period of time.     If HgbA1C is 8 or greater, it needs to be on file within the past 3 months.  If less than 8, must be on file within the past 6 months.     Recent Labs   Lab Test 12/01/21  1059 08/21/18  0831 05/08/17  1627   A1C 6.8*   < >  --    [id number]  --   --  6.1    < > = values in this interval not displayed.           Failed - Patient's CR is NOT>1.4 OR Patient's EGFR is NOT<45 within past 12 mos.     Recent Labs   Lab Test 09/17/20  1708   GFRESTIMATED 84   GFRESTBLACK >90       Recent Labs   Lab Test 09/17/20  1708   CR 0.94           Failed - Recent (6 mo) or future (30 days) visit within the authorizing provider's specialty     Last Office Visit:              12/1/21  Future Office visit:           None    Antoinette Nixon RN .............. 6/2/2022  9:31 AM

## 2022-06-02 NOTE — TELEPHONE ENCOUNTER
Needs DM labs and clinic appointment  - schedule with available provider.     Electronically signed by:  Boo Celeste MD  on June 2, 2022 2:52 PM

## 2022-06-02 NOTE — TELEPHONE ENCOUNTER
Contacted patient, he states he has never had to come in this time of year before, he had his physical in December and is now out of town working construction and will not be able to get in for labs or a visit during the week.     He is wanting at least 30 days supply, but he is not sure when he will be able to get in for these visits.   He would like to know why he has to come in again when he usually comes in once a year.     Patient would like to be contacted with provider response.     Sandra Ballard on 6/2/2022 at 3:06 PM

## 2022-06-06 ENCOUNTER — LAB (OUTPATIENT)
Dept: LAB | Facility: OTHER | Age: 57
End: 2022-06-06
Attending: INTERNAL MEDICINE
Payer: COMMERCIAL

## 2022-06-06 DIAGNOSIS — E78.2 MIXED HYPERLIPIDEMIA: ICD-10-CM

## 2022-06-06 DIAGNOSIS — E11.9 TYPE 2 DIABETES MELLITUS WITHOUT COMPLICATION, WITHOUT LONG-TERM CURRENT USE OF INSULIN (H): ICD-10-CM

## 2022-06-06 LAB
ALBUMIN SERPL-MCNC: 4.7 G/DL (ref 3.5–5.7)
ALBUMIN UR-MCNC: NEGATIVE MG/DL
ALP SERPL-CCNC: 46 U/L (ref 34–104)
ALT SERPL W P-5'-P-CCNC: 26 U/L (ref 7–52)
ANION GAP SERPL CALCULATED.3IONS-SCNC: 8 MMOL/L (ref 3–14)
APPEARANCE UR: CLEAR
AST SERPL W P-5'-P-CCNC: 24 U/L (ref 13–39)
BILIRUB SERPL-MCNC: 0.9 MG/DL (ref 0.3–1)
BILIRUB UR QL STRIP: NEGATIVE
BUN SERPL-MCNC: 11 MG/DL (ref 7–25)
CALCIUM SERPL-MCNC: 9.4 MG/DL (ref 8.6–10.3)
CHLORIDE BLD-SCNC: 102 MMOL/L (ref 98–107)
CHOLEST SERPL-MCNC: 154 MG/DL
CO2 SERPL-SCNC: 27 MMOL/L (ref 21–31)
COLOR UR AUTO: YELLOW
CREAT SERPL-MCNC: 0.97 MG/DL (ref 0.7–1.3)
ERYTHROCYTE [DISTWIDTH] IN BLOOD BY AUTOMATED COUNT: 12.7 % (ref 10–15)
FASTING STATUS PATIENT QL REPORTED: ABNORMAL
GFR SERPL CREATININE-BSD FRML MDRD: >90 ML/MIN/1.73M2
GLUCOSE BLD-MCNC: 124 MG/DL (ref 70–105)
GLUCOSE UR STRIP-MCNC: NEGATIVE MG/DL
HBA1C MFR BLD: 7.1 % (ref 4–6.2)
HCT VFR BLD AUTO: 40.7 % (ref 40–53)
HDLC SERPL-MCNC: 51 MG/DL (ref 23–92)
HGB BLD-MCNC: 13.8 G/DL (ref 13.3–17.7)
HGB UR QL STRIP: NEGATIVE
KETONES UR STRIP-MCNC: NEGATIVE MG/DL
LDLC SERPL CALC-MCNC: 72 MG/DL
LEUKOCYTE ESTERASE UR QL STRIP: NEGATIVE
MCH RBC QN AUTO: 30.7 PG (ref 26.5–33)
MCHC RBC AUTO-ENTMCNC: 33.9 G/DL (ref 31.5–36.5)
MCV RBC AUTO: 90 FL (ref 78–100)
NITRATE UR QL: NEGATIVE
NONHDLC SERPL-MCNC: 103 MG/DL
PH UR STRIP: 5.5 [PH] (ref 5–9)
PLATELET # BLD AUTO: 283 10E3/UL (ref 150–450)
POTASSIUM BLD-SCNC: 4.1 MMOL/L (ref 3.5–5.1)
PROT SERPL-MCNC: 7.2 G/DL (ref 6.4–8.9)
RBC # BLD AUTO: 4.5 10E6/UL (ref 4.4–5.9)
SODIUM SERPL-SCNC: 137 MMOL/L (ref 134–144)
SP GR UR STRIP: 1 (ref 1–1.03)
TRIGL SERPL-MCNC: 153 MG/DL
TSH SERPL DL<=0.005 MIU/L-ACNC: 1.13 MU/L (ref 0.4–4)
UROBILINOGEN UR STRIP-MCNC: NORMAL MG/DL
WBC # BLD AUTO: 6 10E3/UL (ref 4–11)

## 2022-06-06 PROCEDURE — 84443 ASSAY THYROID STIM HORMONE: CPT | Mod: ZL

## 2022-06-06 PROCEDURE — 84478 ASSAY OF TRIGLYCERIDES: CPT | Mod: ZL

## 2022-06-06 PROCEDURE — 82043 UR ALBUMIN QUANTITATIVE: CPT | Mod: ZL

## 2022-06-06 PROCEDURE — 83036 HEMOGLOBIN GLYCOSYLATED A1C: CPT | Mod: ZL

## 2022-06-06 PROCEDURE — 81003 URINALYSIS AUTO W/O SCOPE: CPT | Mod: ZL

## 2022-06-06 PROCEDURE — 80053 COMPREHEN METABOLIC PANEL: CPT | Mod: ZL

## 2022-06-06 PROCEDURE — 36415 COLL VENOUS BLD VENIPUNCTURE: CPT | Mod: ZL

## 2022-06-06 PROCEDURE — 85027 COMPLETE CBC AUTOMATED: CPT | Mod: ZL

## 2022-06-06 PROCEDURE — 82040 ASSAY OF SERUM ALBUMIN: CPT | Mod: ZL

## 2022-06-07 LAB
CREAT UR-MCNC: 21 MG/DL
MICROALBUMIN UR-MCNC: <5 MG/L
MICROALBUMIN/CREAT UR: NORMAL MG/G{CREAT}

## 2022-08-29 DIAGNOSIS — E11.9 TYPE 2 DIABETES MELLITUS WITHOUT COMPLICATION, WITHOUT LONG-TERM CURRENT USE OF INSULIN (H): ICD-10-CM

## 2022-08-29 DIAGNOSIS — I10 BENIGN ESSENTIAL HYPERTENSION: ICD-10-CM

## 2022-08-29 DIAGNOSIS — E78.2 MIXED HYPERLIPIDEMIA: ICD-10-CM

## 2022-08-30 NOTE — TELEPHONE ENCOUNTER
[facility] following:      METOPROLOL SUCC ER 50 MG TAB      Last Prescription Date:   6/2/22  Last Fill Qty/Refills:         90, R-0    Last Office Visit:              12/1/21   Future Office visit:           None      SPIRONOLACTONE 25 MG TABLET      Last Prescription Date:   6/2/22  Last Fill Qty/Refills:         90, R-0        SIMVASTATIN 20 MG TABLET      Last Prescription Date:   6/2/22  Last Fill Qty/Refills:         90, R-0        GLIPIZIDE-METFORMIN 2.5-500 MG  Last Prescription Date:   6/2/22  Last Fill Qty/Refills:         180, R-0          LISINOPRIL 20 MG TABLET    Last Prescription Date:   6/2/22  Last Fill Qty/Refills:         90, R-0          METFORMIN  MG TABLET    Last Prescription Date:   6/2/22  Last Fill Qty/Refills:         180, R-0      Unable to complete prescription refill per RN Medication Refill Policy.     Beckie Hobbs RN .............. 8/30/2022  8:58 AM

## 2022-08-30 NOTE — TELEPHONE ENCOUNTER
[facility] sent Rx request for the following:      Requested Prescriptions   Pending Prescriptions Disp Refills     metoprolol succinate ER (TOPROL XL) 50 MG 24 hr tablet [Pharmacy Med Name: METOPROLOL SUCC ER 50 MG TAB] 90 tablet 0     Sig: TAKE 1 TABLET BY MOUTH EVERY DAY          spironolactone (ALDACTONE) 25 MG tablet [Pharmacy Med Name: SPIRONOLACTONE 25 MG TABLET] 90 tablet 0     Sig: TAKE 1 TABLET BY MOUTH EVERY DAY        simvastatin (ZOCOR) 20 MG tablet [Pharmacy Med Name: SIMVASTATIN 20 MG TABLET] 90 tablet 0     Sig: TAKE 1 TABLET BY MOUTH AT BEDTIME           glipiZIDE-metFORMIN (METAGLIP) 2.5-500 MG tablet [Pharmacy Med Name: GLIPIZIDE-METFORMIN 2.5-500 MG] 180 tablet 0     Sig: TAKE 1 TABLET BY MOUTH TWICE A DAY BEFORE MEALS           lisinopril (ZESTRIL) 20 MG tablet [Pharmacy Med Name: LISINOPRIL 20 MG TABLET] 90 tablet 0     Sig: TAKE 1 TABLET BY MOUTH EVERY DAY           metFORMIN (GLUCOPHAGE) 500 MG tablet [Pharmacy Med Name: METFORMIN  MG TABLET] 180 tablet 0     Sig: TAKE 1 TABLET BY MOUTH TWICE DAILY WITH MEALS. TAKE WITH COMBO GLIPIZIDE-METFORMIN TWICE DAILY.          Last Prescription Date:   6/2/22  Last Fill Qty/Refills:         ***, R-***  Last Office Visit:              ***   Future Office visit:           ***

## 2022-08-31 NOTE — TELEPHONE ENCOUNTER
Needs DM labs and clinic appointment - after 9/6.     Electronically signed by:  Boo Celeste MD  on August 31, 2022 1:21 PM

## 2022-09-01 RX ORDER — GLIPIZIDE AND METFORMIN HCL 2.5; 5 MG/1; MG/1
TABLET, FILM COATED ORAL
Qty: 60 TABLET | Refills: 0 | Status: SHIPPED | OUTPATIENT
Start: 2022-09-01 | End: 2022-09-08

## 2022-09-01 RX ORDER — LISINOPRIL 20 MG/1
TABLET ORAL
Qty: 30 TABLET | Refills: 0 | Status: SHIPPED | OUTPATIENT
Start: 2022-09-01 | End: 2022-09-08

## 2022-09-01 RX ORDER — SPIRONOLACTONE 25 MG/1
TABLET ORAL
Qty: 30 TABLET | Refills: 0 | Status: SHIPPED | OUTPATIENT
Start: 2022-09-01 | End: 2022-09-08

## 2022-09-01 RX ORDER — METOPROLOL SUCCINATE 50 MG/1
TABLET, EXTENDED RELEASE ORAL
Qty: 30 TABLET | Refills: 0 | Status: SHIPPED | OUTPATIENT
Start: 2022-09-01 | End: 2022-09-08

## 2022-09-01 RX ORDER — SIMVASTATIN 20 MG
TABLET ORAL
Qty: 30 TABLET | Refills: 0 | Status: SHIPPED | OUTPATIENT
Start: 2022-09-01 | End: 2022-09-08

## 2022-09-01 NOTE — TELEPHONE ENCOUNTER
Patient was contacted and an appointment was made with lab and Dr. Ceelste for 9.8.2022.  Laine Mcintyre on 9/1/2022 at 9:25 AM

## 2022-09-08 ENCOUNTER — LAB (OUTPATIENT)
Dept: LAB | Facility: OTHER | Age: 57
End: 2022-09-08
Attending: INTERNAL MEDICINE
Payer: COMMERCIAL

## 2022-09-08 ENCOUNTER — OFFICE VISIT (OUTPATIENT)
Dept: INTERNAL MEDICINE | Facility: OTHER | Age: 57
End: 2022-09-08
Attending: INTERNAL MEDICINE
Payer: COMMERCIAL

## 2022-09-08 VITALS
HEIGHT: 70 IN | BODY MASS INDEX: 31.24 KG/M2 | WEIGHT: 218.2 LBS | SYSTOLIC BLOOD PRESSURE: 128 MMHG | TEMPERATURE: 98 F | OXYGEN SATURATION: 96 % | RESPIRATION RATE: 16 BRPM | HEART RATE: 71 BPM | DIASTOLIC BLOOD PRESSURE: 74 MMHG

## 2022-09-08 DIAGNOSIS — I10 BENIGN ESSENTIAL HYPERTENSION: ICD-10-CM

## 2022-09-08 DIAGNOSIS — E11.42 TYPE 2 DIABETES MELLITUS WITH DIABETIC POLYNEUROPATHY, WITHOUT LONG-TERM CURRENT USE OF INSULIN (H): Primary | ICD-10-CM

## 2022-09-08 DIAGNOSIS — Q66.70 HIGH FOOT ARCH: ICD-10-CM

## 2022-09-08 DIAGNOSIS — E78.2 MIXED HYPERLIPIDEMIA: ICD-10-CM

## 2022-09-08 DIAGNOSIS — E66.01 MORBID OBESITY (H): ICD-10-CM

## 2022-09-08 DIAGNOSIS — G47.33 OSA ON CPAP: ICD-10-CM

## 2022-09-08 DIAGNOSIS — L84 PRE-ULCERATIVE CALLUSES: ICD-10-CM

## 2022-09-08 DIAGNOSIS — E11.9 TYPE 2 DIABETES MELLITUS WITHOUT COMPLICATION, WITHOUT LONG-TERM CURRENT USE OF INSULIN (H): ICD-10-CM

## 2022-09-08 LAB
ALBUMIN SERPL-MCNC: 4.6 G/DL (ref 3.5–5.7)
ALBUMIN UR-MCNC: NEGATIVE MG/DL
ALP SERPL-CCNC: 43 U/L (ref 34–104)
ALT SERPL W P-5'-P-CCNC: 28 U/L (ref 7–52)
ANION GAP SERPL CALCULATED.3IONS-SCNC: 9 MMOL/L (ref 3–14)
APPEARANCE UR: CLEAR
AST SERPL W P-5'-P-CCNC: 28 U/L (ref 13–39)
BILIRUB SERPL-MCNC: 0.4 MG/DL (ref 0.3–1)
BILIRUB UR QL STRIP: NEGATIVE
BUN SERPL-MCNC: 12 MG/DL (ref 7–25)
CALCIUM SERPL-MCNC: 9.6 MG/DL (ref 8.6–10.3)
CHLORIDE BLD-SCNC: 101 MMOL/L (ref 98–107)
CHOLEST SERPL-MCNC: 155 MG/DL
CO2 SERPL-SCNC: 27 MMOL/L (ref 21–31)
COLOR UR AUTO: ABNORMAL
CREAT SERPL-MCNC: 1.07 MG/DL (ref 0.7–1.3)
ERYTHROCYTE [DISTWIDTH] IN BLOOD BY AUTOMATED COUNT: 11.7 % (ref 10–15)
FASTING STATUS PATIENT QL REPORTED: ABNORMAL
GFR SERPL CREATININE-BSD FRML MDRD: 81 ML/MIN/1.73M2
GLUCOSE BLD-MCNC: 104 MG/DL (ref 70–105)
GLUCOSE UR STRIP-MCNC: NEGATIVE MG/DL
HBA1C MFR BLD: 7 % (ref 4–6.2)
HCT VFR BLD AUTO: 41.1 % (ref 40–53)
HDLC SERPL-MCNC: 45 MG/DL (ref 23–92)
HGB BLD-MCNC: 14.3 G/DL (ref 13.3–17.7)
HGB UR QL STRIP: NEGATIVE
KETONES UR STRIP-MCNC: 10 MG/DL
LDLC SERPL CALC-MCNC: 50 MG/DL
LEUKOCYTE ESTERASE UR QL STRIP: NEGATIVE
MCH RBC QN AUTO: 31.6 PG (ref 26.5–33)
MCHC RBC AUTO-ENTMCNC: 34.8 G/DL (ref 31.5–36.5)
MCV RBC AUTO: 91 FL (ref 78–100)
NITRATE UR QL: NEGATIVE
NONHDLC SERPL-MCNC: 110 MG/DL
PH UR STRIP: 6 [PH] (ref 5–9)
PLATELET # BLD AUTO: 268 10E3/UL (ref 150–450)
POTASSIUM BLD-SCNC: 4.2 MMOL/L (ref 3.5–5.1)
PROT SERPL-MCNC: 7.5 G/DL (ref 6.4–8.9)
RBC # BLD AUTO: 4.53 10E6/UL (ref 4.4–5.9)
SODIUM SERPL-SCNC: 137 MMOL/L (ref 134–144)
SP GR UR STRIP: 1.01 (ref 1–1.03)
TRIGL SERPL-MCNC: 300 MG/DL
TSH SERPL DL<=0.005 MIU/L-ACNC: 1.2 MU/L (ref 0.4–4)
UROBILINOGEN UR STRIP-MCNC: NORMAL MG/DL
WBC # BLD AUTO: 7.6 10E3/UL (ref 4–11)

## 2022-09-08 PROCEDURE — 82043 UR ALBUMIN QUANTITATIVE: CPT | Mod: ZL

## 2022-09-08 PROCEDURE — 82040 ASSAY OF SERUM ALBUMIN: CPT | Mod: ZL

## 2022-09-08 PROCEDURE — 83036 HEMOGLOBIN GLYCOSYLATED A1C: CPT | Mod: ZL

## 2022-09-08 PROCEDURE — 36415 COLL VENOUS BLD VENIPUNCTURE: CPT | Mod: ZL

## 2022-09-08 PROCEDURE — 80053 COMPREHEN METABOLIC PANEL: CPT | Mod: ZL

## 2022-09-08 PROCEDURE — 99214 OFFICE O/P EST MOD 30 MIN: CPT | Performed by: INTERNAL MEDICINE

## 2022-09-08 PROCEDURE — 85027 COMPLETE CBC AUTOMATED: CPT | Mod: ZL

## 2022-09-08 PROCEDURE — 80061 LIPID PANEL: CPT | Mod: ZL

## 2022-09-08 PROCEDURE — 81003 URINALYSIS AUTO W/O SCOPE: CPT | Mod: ZL

## 2022-09-08 PROCEDURE — 84443 ASSAY THYROID STIM HORMONE: CPT | Mod: ZL

## 2022-09-08 RX ORDER — METOPROLOL SUCCINATE 50 MG/1
50 TABLET, EXTENDED RELEASE ORAL DAILY
Qty: 90 TABLET | Refills: 2 | Status: SHIPPED | OUTPATIENT
Start: 2022-09-08 | End: 2023-02-22

## 2022-09-08 RX ORDER — GABAPENTIN 100 MG/1
100 CAPSULE ORAL 3 TIMES DAILY PRN
Qty: 90 CAPSULE | Refills: 6 | Status: SHIPPED | OUTPATIENT
Start: 2022-09-08 | End: 2023-02-22

## 2022-09-08 RX ORDER — SIMVASTATIN 20 MG
20 TABLET ORAL AT BEDTIME
Qty: 90 TABLET | Refills: 2 | Status: SHIPPED | OUTPATIENT
Start: 2022-09-08 | End: 2023-02-22

## 2022-09-08 RX ORDER — GLIPIZIDE AND METFORMIN HCL 2.5; 5 MG/1; MG/1
1 TABLET, FILM COATED ORAL
Qty: 180 TABLET | Refills: 2 | Status: SHIPPED | OUTPATIENT
Start: 2022-09-08 | End: 2023-02-23

## 2022-09-08 RX ORDER — SPIRONOLACTONE 25 MG/1
25 TABLET ORAL DAILY
Qty: 90 TABLET | Refills: 2 | Status: SHIPPED | OUTPATIENT
Start: 2022-09-08 | End: 2023-02-22

## 2022-09-08 RX ORDER — LISINOPRIL 20 MG/1
20 TABLET ORAL DAILY
Qty: 90 TABLET | Refills: 2 | Status: SHIPPED | OUTPATIENT
Start: 2022-09-08 | End: 2023-02-22

## 2022-09-08 ASSESSMENT — PATIENT HEALTH QUESTIONNAIRE - PHQ9
SUM OF ALL RESPONSES TO PHQ QUESTIONS 1-9: 1
SUM OF ALL RESPONSES TO PHQ QUESTIONS 1-9: 1
10. IF YOU CHECKED OFF ANY PROBLEMS, HOW DIFFICULT HAVE THESE PROBLEMS MADE IT FOR YOU TO DO YOUR WORK, TAKE CARE OF THINGS AT HOME, OR GET ALONG WITH OTHER PEOPLE: NOT DIFFICULT AT ALL

## 2022-09-08 ASSESSMENT — ENCOUNTER SYMPTOMS
BRUISES/BLEEDS EASILY: 0
NAUSEA: 0
COUGH: 0
DIZZINESS: 0
VOMITING: 0
WOUND: 0
FEVER: 0
CONFUSION: 0
ARTHRALGIAS: 0
MYALGIAS: 0
HEADACHES: 1
LIGHT-HEADEDNESS: 0
DYSURIA: 0
ABDOMINAL PAIN: 0
CHILLS: 0
DIARRHEA: 0
HEMATURIA: 0
AGITATION: 0
SHORTNESS OF BREATH: 0
APNEA: 1
PALPITATIONS: 0
WHEEZING: 0

## 2022-09-08 ASSESSMENT — PAIN SCALES - GENERAL: PAINLEVEL: EXTREME PAIN (8)

## 2022-09-08 NOTE — PROGRESS NOTES
Assessment & Plan   Willis Oliveira is a 56 year old, presenting for the following health issues:      ICD-10-CM    1. Type 2 diabetes mellitus with diabetic polyneuropathy, without long-term current use of insulin (H)  E11.42 gabapentin (NEURONTIN) 100 MG capsule     glipiZIDE-metFORMIN (METAGLIP) 2.5-500 MG tablet     metFORMIN (GLUCOPHAGE) 500 MG tablet     Semaglutide 3 MG TABS     Semaglutide 7 MG TABS     Semaglutide 14 MG TABS   2. Pre-ulcerative calluses  L84    3. Benign essential hypertension  I10 lisinopril (ZESTRIL) 20 MG tablet     metoprolol succinate ER (TOPROL XL) 50 MG 24 hr tablet     spironolactone (ALDACTONE) 25 MG tablet   4. Mixed hyperlipidemia  E78.2 simvastatin (ZOCOR) 20 MG tablet   5. Morbid obesity (H)  E66.01 Semaglutide 3 MG TABS     Semaglutide 7 MG TABS     Semaglutide 14 MG TABS   6. REKHA on CPAP - uses daily, finds helpful and beneficial  G47.33 CPAP Order for DME - ONLY FOR DME    Z99.89    7. High foot arch  Q66.70    Patient presents for diabetes follow-up as well as follow-up multiple issues.    Type 2 diabetes, blood sugars are well controlled but he is struggling with weight management.  We discussed some treatment options.  He would like to start oral semaglutide tablets.  Prescription sent to pharmacy.  Continues with glipizide and metformin for needs refills as well.    Diabetic polyneuropathy.  Chronic, ongoing.  Continues with gabapentin use.  Needs refills.    Preulcerative calluses with diabetes neuropathy.    Hypertension, blood pressure is well controlled.  Doing well with current medications.  Needs refills of lisinopril and metoprolol, and spironolactone.    Mixed hyperlipidemia.  Triglycerides are high and not at goal.  If lifestyle modifications or not adequate we will need to increase statin therapy.  Currently taking simvastatin 20 mg daily.      Obesity, discussed need for reduced oral caloric intake.  Regular exercise.  Reduce carbohydrate intake.  Information  printed and reviewed.  -Start semaglutide tablets.    Sleep apnea, chronic, ongoing.  Needs updated CPAP supplies.  Encouraged ongoing, regular use.    Foot deformity, has high foot arches.    Encouraged weight loss and regular exercise.     Return in about 5 months (around 2/8/2023) for Annual Physical Exam, + Get Diabetic labs prior to clinic appointment.    Boo Celeste MD  [facility]      Subjective   Diabetes      History of Present Illness       Diabetes:   He presents for follow up of diabetes.  He is checking home blood glucose a few times a month. He checks blood glucose at bedtime.  Blood glucose is never over 200 and never under 70. When his blood glucose is low, the patient is asymptomatic for confusion, blurred vision, lethargy and reports not feeling dizzy, shaky, or weak.  He has no concerns regarding his diabetes at this time.  He is not experiencing numbness or burning in feet, excessive thirst, blurry vision, weight changes or redness, sores or blisters on feet. The patient has not had a diabetic eye exam in the last 12 months.         He eats 2-3 servings of fruits and vegetables daily.He consumes 0 sweetened beverage(s) daily.He exercises with enough effort to increase his heart rate 10 to 19 minutes per day.  He exercises with enough effort to increase his heart rate 3 or less days per week.   He is taking medications regularly.    Today's PHQ-9         PHQ-9 Total Score: 1    PHQ-9 Q9 Thoughts of better off dead/self-harm past 2 weeks :   Not at all    How difficult have these problems made it for you to do your work, take care of things at home, or get along with other people: Not difficult at all     Review of Systems   Constitutional: Negative for chills and fever.   HENT: Negative for congestion and hearing loss.    Eyes: Negative for visual disturbance.   Respiratory: Positive for apnea (Uses CPAP regularly, finds helpful and beneficial). Negative for cough,  "shortness of breath and wheezing.    Cardiovascular: Negative for chest pain and palpitations.   Gastrointestinal: Negative for abdominal pain, diarrhea, nausea and vomiting.   Endocrine: Negative for cold intolerance and heat intolerance.        Not watching his diet that closely.   Genitourinary: Negative for dysuria and hematuria.        + mild increased urinary frequency, can't hold it as long   Musculoskeletal: Negative for arthralgias and myalgias.        + some chronic foot pain   Skin: Negative for rash and wound.   Allergic/Immunologic: Negative for immunocompromised state.   Neurological: Positive for headaches (rare, in temples). Negative for dizziness and light-headedness.   Hematological: Does not bruise/bleed easily.   Psychiatric/Behavioral: Negative for agitation and confusion.          Objective    /74 (BP Location: Right arm, Patient Position: Sitting, Cuff Size: Adult Large)   Pulse 71   Temp 98  F (36.7  C) (Temporal)   Resp 16   Ht 1.778 m (5' 10\")   Wt 99 kg (218 lb 3.2 oz)   SpO2 96%   BMI 31.31 kg/m    Body mass index is 31.31 kg/m .  Physical Exam  Constitutional:       General: He is not in acute distress.     Appearance: He is well-developed. He is not diaphoretic.   HENT:      Head: Normocephalic and atraumatic.   Eyes:      General: No scleral icterus.     Conjunctiva/sclera: Conjunctivae normal.   Neck:      Vascular: No carotid bruit.   Cardiovascular:      Rate and Rhythm: Normal rate and regular rhythm.      Pulses: Normal pulses.   Pulmonary:      Effort: Pulmonary effort is normal.      Breath sounds: Normal breath sounds.   Abdominal:      Palpations: Abdomen is soft.      Tenderness: There is no abdominal tenderness.   Musculoskeletal:         General: No deformity.      Cervical back: Neck supple.      Right lower leg: No edema.      Left lower leg: No edema.   Lymphadenopathy:      Cervical: No cervical adenopathy.   Skin:     General: Skin is warm and dry.      " Coloration: Skin is not jaundiced.      Findings: No rash.   Neurological:      Mental Status: He is alert and oriented to person, place, and time. Mental status is at baseline.   Psychiatric:         Mood and Affect: Mood normal.         Behavior: Behavior normal.          Updated orders for CPAP supplies ordered today.   Contact [facility] (or your preferred CPAP Vendor) and notify them you were seen today for CPAP supplies orders.     [facility]   [facility] -- [address], [address]  Phone: [phone]      Recent Labs   Lab Test 09/08/22  1456 06/06/22  1342 12/01/21  1059 12/01/21  1059 09/17/20  1708   A1C 7.0* 7.1*  --  6.8* 6.3*   LDL 50 72  --  46 87   HDL 45 51  --  44 59   TRIG 300* 153*  --  211* 105   ALT 28 26  --   --  28   CR 1.07 0.97  --   --  0.94   GFRESTIMATED 81 >90  --   --  84   POTASSIUM 4.2 4.1  --   --  3.8   TSH 1.20 1.13   < > 1.40  --    WBC 7.6 6.0   < > 7.5 6.5   HGB 14.3 13.8   < > 14.2 14.3    283   < > 275 256   ALBUMIN 4.6 4.7  --   --  4.8    < > = values in this interval not displayed.     Hemoglobin A1c is well controlled.  LDL HDL are at goal.  Triglycerides are high and not at goal.  ALT is normal.  Creatinine has worsened slightly.  Potassium is normal.  TSH is normal.  CBC is normal.  Albumin is normal.

## 2022-09-08 NOTE — NURSING NOTE
"Chief Complaint   Patient presents with     Diabetes         Initial /74 (BP Location: Right arm, Patient Position: Sitting, Cuff Size: Adult Large)   Pulse 71   Temp 98  F (36.7  C) (Temporal)   Resp 16   Ht 1.778 m (5' 10\")   Wt 99 kg (218 lb 3.2 oz)   SpO2 96%   BMI 31.31 kg/m   Estimated body mass index is 31.31 kg/m  as calculated from the following:    Height as of this encounter: 1.778 m (5' 10\").    Weight as of this encounter: 99 kg (218 lb 3.2 oz).       FOOD SECURITY SCREENING QUESTIONS:    The next two questions are to help us understand your food security.  If you are feeling you need any assistance in this area, we have resources available to support you today.    Hunger Vital Signs:  Within the past 12 months we worried whether our food would run out before we got money to buy more. Never  Within the past 12 months the food we bought just didn't last and we didn't have money to get more. Never    Advance Care Directive on file? no      Medication reconciliation complete.      Jorje Erickson,on 9/8/2022 at 3:50 PM        "

## 2022-09-08 NOTE — PATIENT INSTRUCTIONS
Start trial of Rybelus tablets.     Start trial of Gabapentin for nerve pain / foot pain.     Blood pressure is well controlled.   Diabetes is well controlled.     Medications refilled.   Labs are stable.     Updated orders for CPAP supplies ordered today.   Contact [facility] (or your preferred CPAP Vendor) and notify them you were seen today for CPAP supplies orders.     [facility]   [facility] -- [address], [address]  Phone: [phone]      Results for orders placed or performed in visit on 09/08/22   Comprehensive metabolic panel     Status: Normal   Result Value Ref Range    Sodium 137 134 - 144 mmol/L    Potassium 4.2 3.5 - 5.1 mmol/L    Chloride 101 98 - 107 mmol/L    Carbon Dioxide (CO2) 27 21 - 31 mmol/L    Anion Gap 9 3 - 14 mmol/L    Urea Nitrogen 12 7 - 25 mg/dL    Creatinine 1.07 0.70 - 1.30 mg/dL    Calcium 9.6 8.6 - 10.3 mg/dL    Glucose 104 70 - 105 mg/dL    Alkaline Phosphatase 43 34 - 104 U/L    AST 28 13 - 39 U/L    ALT 28 7 - 52 U/L    Protein Total 7.5 6.4 - 8.9 g/dL    Albumin 4.6 3.5 - 5.7 g/dL    Bilirubin Total 0.4 0.3 - 1.0 mg/dL    GFR Estimate 81 >60 mL/min/1.73m2   Lipid Profile     Status: Abnormal   Result Value Ref Range    Cholesterol 155 <200 mg/dL    Triglycerides 300 (H) <150 mg/dL    Direct Measure HDL 45 23 - 92 mg/dL    LDL Cholesterol Calculated 50 <=100 mg/dL    Non HDL Cholesterol 110 <130 mg/dL    Patient Fasting > 8hrs? Unknown     Narrative    Cholesterol  Desirable:  <200 mg/dL    Triglycerides  Normal:  Less than 150 mg/dL  Borderline High:  150-199 mg/dL  High:  200-499 mg/dL  Very High:  Greater than or equal to 500 mg/dL    Direct Measure HDL  Female:  Greater than or equal to 50 mg/dL   Male:  Greater than or equal to 40 mg/dL    LDL Cholesterol  Desirable:  <100mg/dL  Above Desirable:  100-129 mg/dL   Borderline High:  130-159 mg/dL   High:  160-189 mg/dL   Very High:  >= 190 mg/dL    Non HDL  Cholesterol  Desirable:  130 mg/dL  Above Desirable:  130-159 mg/dL  Borderline High:  160-189 mg/dL  High:  190-219 mg/dL  Very High:  Greater than or equal to 220 mg/dL   CBC with platelets     Status: Normal   Result Value Ref Range    WBC Count 7.6 4.0 - 11.0 10e3/uL    RBC Count 4.53 4.40 - 5.90 10e6/uL    Hemoglobin 14.3 13.3 - 17.7 g/dL    Hematocrit 41.1 40.0 - 53.0 %    MCV 91 78 - 100 fL    MCH 31.6 26.5 - 33.0 pg    MCHC 34.8 31.5 - 36.5 g/dL    RDW 11.7 10.0 - 15.0 %    Platelet Count 268 150 - 450 10e3/uL   Hemoglobin A1c     Status: Abnormal   Result Value Ref Range    Hemoglobin A1C 7.0 (H) 4.0 - 6.2 %   TSH with free T4 reflex     Status: Normal   Result Value Ref Range    TSH 1.20 0.40 - 4.00 mU/L   UA reflex to Microscopic     Status: Abnormal   Result Value Ref Range    Color Urine Light Yellow Colorless, Straw, Light Yellow, Yellow    Appearance Urine Clear Clear    Glucose Urine Negative Negative mg/dL    Bilirubin Urine Negative Negative    Ketones Urine 10  (A) Negative mg/dL    Specific Gravity Urine 1.013 1.000 - 1.030    Blood Urine Negative Negative    pH Urine 6.0 5.0 - 9.0    Protein Albumin Urine Negative Negative mg/dL    Urobilinogen Urine Normal Normal, 2.0 mg/dL    Nitrite Urine Negative Negative    Leukocyte Esterase Urine Negative Negative    Narrative    Microscopic not indicated      Return in approximately 5 months, or sooner as needed for follow-up with Dr. Celeste.  - Annual Follow-up / Physical + Diabetes labs prior to appointment.     Clinic : [phone]  Appointment line: [phone]

## 2022-09-09 LAB
CREAT UR-MCNC: 106 MG/DL
MICROALBUMIN UR-MCNC: <12 MG/L
MICROALBUMIN/CREAT UR: NORMAL MG/G{CREAT}

## 2022-10-05 DIAGNOSIS — E66.01 MORBID OBESITY (H): ICD-10-CM

## 2022-10-05 DIAGNOSIS — E11.42 TYPE 2 DIABETES MELLITUS WITH DIABETIC POLYNEUROPATHY, WITHOUT LONG-TERM CURRENT USE OF INSULIN (H): ICD-10-CM

## 2022-10-07 RX ORDER — ORAL SEMAGLUTIDE 7 MG/1
TABLET ORAL
Qty: 30 TABLET | Refills: 0 | OUTPATIENT
Start: 2022-10-07

## 2022-10-07 NOTE — TELEPHONE ENCOUNTER
[id number] [employer] [facility] [place] sent Rx request for the following:      Requested Prescriptions   Pending Prescriptions Disp Refills     RYBELSUS 7 MG TABS [Pharmacy Med Name: RYBELSUS 7 MG TABLET] 30 tablet 0     Sig: TAKE 7 MG BY MOUTH DAILY -- AFTER 30 DAYS, THEN INCREASE DOSE TO 14 MG   Last Prescription Date:   9/8/22  Last Fill Qty/Refills:         30, R-0    Last Office Visit:              9/8/22   Future Office visit:           2/28/23    Per LOV note:  Return in about 5 months (around 2/8/2023) for Annual Physical Exam, + Get Diabetic labs prior to clinic appointment.    Noted the following:  Semaglutide 14 MG TABS 90 tablet 3 9/8/2022  --   Sig - Route: Take 14 mg by mouth daily -- start when done with 7 mg tablets - Oral   Sent to pharmacy as: Semaglutide 14 MG Oral Tablet   Class: E-Prescribe   Order: [id number]   E-Prescribing Status: Receipt confirmed by pharmacy (9/8/2022  4:29 PM CDT)     Refused, with note to pharmacy.     Antoinette Nixon RN .............. 10/7/2022  4:50 PM

## 2022-10-26 PROCEDURE — 0134A COVID-19,PF,MODERNA BIVALENT: CPT | Performed by: INTERNAL MEDICINE

## 2022-10-26 PROCEDURE — 91313 COVID-19,PF,MODERNA BIVALENT: CPT | Performed by: INTERNAL MEDICINE

## 2022-10-28 ENCOUNTER — DOCUMENTATION ONLY (OUTPATIENT)
Dept: INTERNAL MEDICINE | Facility: OTHER | Age: 57
End: 2022-10-28
Payer: COMMERCIAL

## 2022-10-28 DIAGNOSIS — Z23 HIGH PRIORITY FOR 2019-NCOV VACCINE: Primary | ICD-10-CM

## 2022-11-18 ENCOUNTER — MYC MEDICAL ADVICE (OUTPATIENT)
Dept: INTERNAL MEDICINE | Facility: OTHER | Age: 57
End: 2022-11-18

## 2022-11-18 DIAGNOSIS — E66.01 MORBID OBESITY (H): ICD-10-CM

## 2022-11-18 DIAGNOSIS — E11.42 TYPE 2 DIABETES MELLITUS WITH DIABETIC POLYNEUROPATHY, WITHOUT LONG-TERM CURRENT USE OF INSULIN (H): ICD-10-CM

## 2022-12-09 DIAGNOSIS — E11.42 TYPE 2 DIABETES MELLITUS WITH DIABETIC POLYNEUROPATHY, WITHOUT LONG-TERM CURRENT USE OF INSULIN (H): ICD-10-CM

## 2022-12-13 NOTE — TELEPHONE ENCOUNTER
[id number] [employer] [facility] [place] sent Rx request for the following:      Requested Prescriptions   Pending Prescriptions Disp Refills     metFORMIN (GLUCOPHAGE) 500 MG tablet [Pharmacy Med Name: METFORMIN  MG TABLET] 180 tablet 1     Sig: TAKE 1 TABLET (500 MG) BY MOUTH 2 TIMES DAILY (WITH MEALS) + TAKE WITH GLIPIZIDE-METFORMIN COMBO   Last Prescription Date:   9/8/22  Last Fill Qty/Refills:         90, R-2    Last Office Visit:              9/8/22   Future Office visit:             Next 5 appointments (look out 90 days)    Feb 28, 2023  8:40 AM  PHYSICAL with Boo Celeste MD  [facility] ([facility] ) [address]  [place] [id number] [phone]        Per LOV note: Return in about 5 months (around 2/8/2023) for Annual Physical Exam, + Get Diabetic labs prior to clinic appointment.    Antoinette Nixon RN .............. 12/13/2022  2:30 PM

## 2023-02-08 ENCOUNTER — DOCUMENTATION ONLY (OUTPATIENT)
Dept: INTERNAL MEDICINE | Facility: OTHER | Age: 58
End: 2023-02-08
Payer: COMMERCIAL

## 2023-02-08 DIAGNOSIS — G47.33 OBSTRUCTIVE SLEEP APNEA (ADULT) (PEDIATRIC): Primary | ICD-10-CM

## 2023-02-17 ENCOUNTER — TELEPHONE (OUTPATIENT)
Dept: LAB | Facility: OTHER | Age: 58
End: 2023-02-17
Payer: COMMERCIAL

## 2023-02-17 DIAGNOSIS — Z12.5 ENCOUNTER FOR SCREENING FOR MALIGNANT NEOPLASM OF PROSTATE: ICD-10-CM

## 2023-02-17 DIAGNOSIS — E11.9 TYPE 2 DIABETES MELLITUS WITHOUT COMPLICATION, WITHOUT LONG-TERM CURRENT USE OF INSULIN (H): Primary | ICD-10-CM

## 2023-02-17 DIAGNOSIS — E78.2 MIXED HYPERLIPIDEMIA: ICD-10-CM

## 2023-02-22 ENCOUNTER — TELEPHONE (OUTPATIENT)
Dept: INTERNAL MEDICINE | Facility: OTHER | Age: 58
End: 2023-02-22
Payer: COMMERCIAL

## 2023-02-22 RX ORDER — GLIPIZIDE AND METFORMIN HCL 2.5; 5 MG/1; MG/1
1 TABLET, FILM COATED ORAL
Qty: 180 TABLET | Refills: 4 | Status: CANCELLED | OUTPATIENT
Start: 2023-02-22

## 2023-02-22 ASSESSMENT — ANXIETY QUESTIONNAIRES
5. BEING SO RESTLESS THAT IT IS HARD TO SIT STILL: NOT AT ALL
4. TROUBLE RELAXING: SEVERAL DAYS
7. FEELING AFRAID AS IF SOMETHING AWFUL MIGHT HAPPEN: NOT AT ALL
1. FEELING NERVOUS, ANXIOUS, OR ON EDGE: NOT AT ALL
2. NOT BEING ABLE TO STOP OR CONTROL WORRYING: SEVERAL DAYS
GAD7 TOTAL SCORE: 3
7. FEELING AFRAID AS IF SOMETHING AWFUL MIGHT HAPPEN: NOT AT ALL
3. WORRYING TOO MUCH ABOUT DIFFERENT THINGS: NOT AT ALL
IF YOU CHECKED OFF ANY PROBLEMS ON THIS QUESTIONNAIRE, HOW DIFFICULT HAVE THESE PROBLEMS MADE IT FOR YOU TO DO YOUR WORK, TAKE CARE OF THINGS AT HOME, OR GET ALONG WITH OTHER PEOPLE: SOMEWHAT DIFFICULT
6. BECOMING EASILY ANNOYED OR IRRITABLE: SEVERAL DAYS
GAD7 TOTAL SCORE: 3
8. IF YOU CHECKED OFF ANY PROBLEMS, HOW DIFFICULT HAVE THESE MADE IT FOR YOU TO DO YOUR WORK, TAKE CARE OF THINGS AT HOME, OR GET ALONG WITH OTHER PEOPLE?: SOMEWHAT DIFFICULT
GAD7 TOTAL SCORE: 3

## 2023-02-22 ASSESSMENT — ENCOUNTER SYMPTOMS
JOINT SWELLING: 0
EYE PAIN: 0
ARTHRALGIAS: 0
NERVOUS/ANXIOUS: 0
DIARRHEA: 0
HEMATURIA: 0
SORE THROAT: 0
ABDOMINAL PAIN: 0
PALPITATIONS: 0
COUGH: 0
FREQUENCY: 0
FEVER: 0
WEAKNESS: 0
HEARTBURN: 0
CONSTIPATION: 0
DYSURIA: 0
SHORTNESS OF BREATH: 0
MYALGIAS: 0
HEADACHES: 1
DIZZINESS: 0
CHILLS: 0
HEMATOCHEZIA: 0
NAUSEA: 0
PARESTHESIAS: 0

## 2023-02-22 ASSESSMENT — PATIENT HEALTH QUESTIONNAIRE - PHQ9
SUM OF ALL RESPONSES TO PHQ QUESTIONS 1-9: 2
10. IF YOU CHECKED OFF ANY PROBLEMS, HOW DIFFICULT HAVE THESE PROBLEMS MADE IT FOR YOU TO DO YOUR WORK, TAKE CARE OF THINGS AT HOME, OR GET ALONG WITH OTHER PEOPLE: SOMEWHAT DIFFICULT
SUM OF ALL RESPONSES TO PHQ QUESTIONS 1-9: 2

## 2023-02-22 NOTE — TELEPHONE ENCOUNTER
Dr. Celeste,   I was unable to enter a note under PreVisit charting for this patient's visit with you tomorrow morning. Below is the documentation for your review.     Pre-Visit Planning   Next 5 appointments (look out 90 days)    Feb 23, 2023  8:00 AM  PHYSICAL with Boo Celeste MD  [facility] ([facility] ) [address]  [place] [id number] [phone]        Appointment Notes for this encounter:   Data Unavailable    Questionnaires Reviewed/Assigned  per LPN    Contacted patient via phone/[employer]. Are there any additional questions or concerns you'd like to review with your provider during your visit? Yes:     1.) Patient received his influenza vaccine at the pharmacy and does not want any of the others in Care Gaps.  2.) Patient states he has been feeling down lately which is unusual for him. He does not have a history of seasonal depression. It is starting to affect his mood at work and feels it is time to talk to you about it.   3.) Patient's chronic bilateral foot pain is very bothersome to him. He reports the arches constantly ache at 1-2/10 with it being the worst at night upwards of 8-9/10. At night the pain feels like cramping, stabbing or shooting that sometimes goes to the heel and wakes him up at times. He has been working from home so wears slippers rather than his orthopedic shoes around the house.     Visit is preventive. Reviewed purpose of preventive visit with patient.    Meds  Home Meds reviewed and updated  Refills pended    Entered patient-preferred pharmacy.     Current Outpatient Medications   Medication     alcohol swab prep pads     aspirin EC 81 MG EC tablet     blood glucose (NO BRAND SPECIFIED) test strip     blood glucose calibration (NO BRAND SPECIFIED) solution     blood glucose monitoring (ACCU-CHEK JAMAL PLUS) meter device kit     Blood Glucose Monitoring Suppl ([employer] BLOOD GLUCOSE MONITOR) W/DEVICE  KIT     gabapentin (NEURONTIN) 100 MG capsule     glipiZIDE-metFORMIN (METAGLIP) 2.5-500 MG tablet     lancets 28G MISC     lisinopril (ZESTRIL) 20 MG tablet     metFORMIN (GLUCOPHAGE) 500 MG tablet     metoprolol succinate ER (TOPROL XL) 50 MG 24 hr tablet     Multiple Vitamin (MULTI-VITAMINS) TABS     Semaglutide 7 MG TABS     simvastatin (ZOCOR) 20 MG tablet     spironolactone (ALDACTONE) 25 MG tablet     thin (NO BRAND SPECIFIED) lancets     No current facility-administered medications for this visit.     [employer]  Patient is active on [employer].    Questionnaire Review   will arrive as scheduled    Call Summary  Advised patient to call back at clinic if needed.     Sandra Martinez RN on 2/22/2023 at 1:22 PM

## 2023-02-23 ENCOUNTER — LAB (OUTPATIENT)
Dept: LAB | Facility: OTHER | Age: 58
End: 2023-02-23
Attending: INTERNAL MEDICINE
Payer: COMMERCIAL

## 2023-02-23 ENCOUNTER — OFFICE VISIT (OUTPATIENT)
Dept: INTERNAL MEDICINE | Facility: OTHER | Age: 58
End: 2023-02-23
Attending: INTERNAL MEDICINE
Payer: COMMERCIAL

## 2023-02-23 VITALS
WEIGHT: 201.2 LBS | SYSTOLIC BLOOD PRESSURE: 122 MMHG | HEIGHT: 70 IN | DIASTOLIC BLOOD PRESSURE: 70 MMHG | BODY MASS INDEX: 28.8 KG/M2 | RESPIRATION RATE: 20 BRPM | HEART RATE: 78 BPM | TEMPERATURE: 96.5 F | OXYGEN SATURATION: 97 %

## 2023-02-23 DIAGNOSIS — G47.33 OSA ON CPAP: ICD-10-CM

## 2023-02-23 DIAGNOSIS — I10 BENIGN ESSENTIAL HYPERTENSION: ICD-10-CM

## 2023-02-23 DIAGNOSIS — E11.9 TYPE 2 DIABETES MELLITUS WITHOUT COMPLICATION, WITHOUT LONG-TERM CURRENT USE OF INSULIN (H): ICD-10-CM

## 2023-02-23 DIAGNOSIS — Z12.5 ENCOUNTER FOR SCREENING FOR MALIGNANT NEOPLASM OF PROSTATE: ICD-10-CM

## 2023-02-23 DIAGNOSIS — Q66.70 HIGH FOOT ARCH: ICD-10-CM

## 2023-02-23 DIAGNOSIS — Z71.85 VACCINE COUNSELING: ICD-10-CM

## 2023-02-23 DIAGNOSIS — E78.2 MIXED HYPERLIPIDEMIA: ICD-10-CM

## 2023-02-23 DIAGNOSIS — F43.0 ACUTE REACTION TO SITUATIONAL STRESS: ICD-10-CM

## 2023-02-23 DIAGNOSIS — Z00.00 ANNUAL PHYSICAL EXAM: ICD-10-CM

## 2023-02-23 DIAGNOSIS — R25.2 NOCTURNAL FOOT CRAMPS: ICD-10-CM

## 2023-02-23 DIAGNOSIS — E11.42 TYPE 2 DIABETES MELLITUS WITH DIABETIC POLYNEUROPATHY, WITHOUT LONG-TERM CURRENT USE OF INSULIN (H): Primary | ICD-10-CM

## 2023-02-23 DIAGNOSIS — E53.8 VITAMIN B12 DEFICIENCY: ICD-10-CM

## 2023-02-23 LAB
ALBUMIN SERPL BCG-MCNC: 4.3 G/DL (ref 3.5–5.2)
ALBUMIN UR-MCNC: 10 MG/DL
ALP SERPL-CCNC: 56 U/L (ref 40–129)
ALT SERPL W P-5'-P-CCNC: 13 U/L (ref 10–50)
ANION GAP SERPL CALCULATED.3IONS-SCNC: 9 MMOL/L (ref 7–15)
APPEARANCE UR: CLEAR
AST SERPL W P-5'-P-CCNC: 18 U/L (ref 10–50)
BILIRUB SERPL-MCNC: 0.6 MG/DL
BILIRUB UR QL STRIP: NEGATIVE
BUN SERPL-MCNC: 8.8 MG/DL (ref 6–20)
CALCIUM SERPL-MCNC: 9.4 MG/DL (ref 8.6–10)
CHLORIDE SERPL-SCNC: 100 MMOL/L (ref 98–107)
CHOLEST SERPL-MCNC: 114 MG/DL
COLOR UR AUTO: YELLOW
CREAT SERPL-MCNC: 0.94 MG/DL (ref 0.67–1.17)
CREAT UR-MCNC: 180 MG/DL
DEPRECATED HCO3 PLAS-SCNC: 28 MMOL/L (ref 22–29)
ERYTHROCYTE [DISTWIDTH] IN BLOOD BY AUTOMATED COUNT: 11.9 % (ref 10–15)
GFR SERPL CREATININE-BSD FRML MDRD: >90 ML/MIN/1.73M2
GLUCOSE SERPL-MCNC: 139 MG/DL (ref 70–99)
GLUCOSE UR STRIP-MCNC: NEGATIVE MG/DL
HBA1C MFR BLD: 5.7 % (ref 4–6.2)
HCT VFR BLD AUTO: 40.9 % (ref 40–53)
HDLC SERPL-MCNC: 41 MG/DL
HGB BLD-MCNC: 14 G/DL (ref 13.3–17.7)
HGB UR QL STRIP: ABNORMAL
HOLD SPECIMEN: NORMAL
HYALINE CASTS: 4 /LPF
KETONES UR STRIP-MCNC: NEGATIVE MG/DL
LDLC SERPL CALC-MCNC: 44 MG/DL
LEUKOCYTE ESTERASE UR QL STRIP: NEGATIVE
MCH RBC QN AUTO: 30.5 PG (ref 26.5–33)
MCHC RBC AUTO-ENTMCNC: 34.2 G/DL (ref 31.5–36.5)
MCV RBC AUTO: 89 FL (ref 78–100)
MICROALBUMIN UR-MCNC: 17.9 MG/L
MICROALBUMIN/CREAT UR: 9.94 MG/G CR (ref 0–17)
MUCOUS THREADS #/AREA URNS LPF: PRESENT /LPF
NITRATE UR QL: NEGATIVE
NONHDLC SERPL-MCNC: 73 MG/DL
PH UR STRIP: 6 [PH] (ref 5–9)
PLATELET # BLD AUTO: 261 10E3/UL (ref 150–450)
POTASSIUM SERPL-SCNC: 4.2 MMOL/L (ref 3.4–5.3)
PROT SERPL-MCNC: 6.9 G/DL (ref 6.4–8.3)
PSA SERPL-MCNC: 0.34 NG/ML (ref 0–3.5)
RBC # BLD AUTO: 4.59 10E6/UL (ref 4.4–5.9)
RBC URINE: 1 /HPF
SODIUM SERPL-SCNC: 137 MMOL/L (ref 136–145)
SP GR UR STRIP: 1.02 (ref 1–1.03)
TRIGL SERPL-MCNC: 144 MG/DL
TSH SERPL DL<=0.005 MIU/L-ACNC: 1.19 UIU/ML (ref 0.3–4.2)
UROBILINOGEN UR STRIP-MCNC: NORMAL MG/DL
WBC # BLD AUTO: 5.6 10E3/UL (ref 4–11)
WBC URINE: 2 /HPF

## 2023-02-23 PROCEDURE — 82570 ASSAY OF URINE CREATININE: CPT | Mod: ZL

## 2023-02-23 PROCEDURE — 81001 URINALYSIS AUTO W/SCOPE: CPT | Mod: ZL

## 2023-02-23 PROCEDURE — 84153 ASSAY OF PSA TOTAL: CPT | Mod: ZL

## 2023-02-23 PROCEDURE — 99214 OFFICE O/P EST MOD 30 MIN: CPT | Mod: 25 | Performed by: INTERNAL MEDICINE

## 2023-02-23 PROCEDURE — 83036 HEMOGLOBIN GLYCOSYLATED A1C: CPT | Mod: ZL

## 2023-02-23 PROCEDURE — 85027 COMPLETE CBC AUTOMATED: CPT | Mod: ZL

## 2023-02-23 PROCEDURE — 250N000011 HC RX IP 250 OP 636: Performed by: INTERNAL MEDICINE

## 2023-02-23 PROCEDURE — 84443 ASSAY THYROID STIM HORMONE: CPT | Mod: ZL

## 2023-02-23 PROCEDURE — 99396 PREV VISIT EST AGE 40-64: CPT | Performed by: INTERNAL MEDICINE

## 2023-02-23 PROCEDURE — 36415 COLL VENOUS BLD VENIPUNCTURE: CPT | Mod: ZL

## 2023-02-23 PROCEDURE — 80061 LIPID PANEL: CPT | Mod: ZL

## 2023-02-23 PROCEDURE — 96372 THER/PROPH/DIAG INJ SC/IM: CPT | Performed by: INTERNAL MEDICINE

## 2023-02-23 PROCEDURE — 80053 COMPREHEN METABOLIC PANEL: CPT | Mod: ZL

## 2023-02-23 RX ORDER — LISINOPRIL 20 MG/1
20 TABLET ORAL DAILY
Qty: 90 TABLET | Refills: 1 | Status: SHIPPED | OUTPATIENT
Start: 2023-02-23 | End: 2023-08-03

## 2023-02-23 RX ORDER — CYANOCOBALAMIN (VITAMIN B-12) 2500 MCG
2500 TABLET, SUBLINGUAL SUBLINGUAL DAILY
Qty: 200 TABLET | Refills: 1 | COMMUNITY
Start: 2023-02-23 | End: 2024-02-23

## 2023-02-23 RX ORDER — GABAPENTIN 100 MG/1
CAPSULE ORAL
Qty: 450 CAPSULE | Refills: 1 | Status: SHIPPED | OUTPATIENT
Start: 2023-02-23 | End: 2023-09-15

## 2023-02-23 RX ORDER — MAGNESIUM CHLORIDE 71.5 G/G
1-2 TABLET ORAL EVERY EVENING
Qty: 200 TABLET | Refills: 4 | Status: SHIPPED | OUTPATIENT
Start: 2023-02-23 | End: 2024-02-23

## 2023-02-23 RX ORDER — ESCITALOPRAM OXALATE 10 MG/1
10 TABLET ORAL DAILY
Qty: 90 TABLET | Refills: 1 | Status: SHIPPED | OUTPATIENT
Start: 2023-02-23 | End: 2024-02-23

## 2023-02-23 RX ORDER — SPIRONOLACTONE 25 MG/1
25 TABLET ORAL DAILY
Qty: 90 TABLET | Refills: 1 | Status: SHIPPED | OUTPATIENT
Start: 2023-02-23 | End: 2023-08-03

## 2023-02-23 RX ORDER — METOPROLOL SUCCINATE 50 MG/1
50 TABLET, EXTENDED RELEASE ORAL DAILY
Qty: 90 TABLET | Refills: 1 | Status: SHIPPED | OUTPATIENT
Start: 2023-02-23 | End: 2023-08-03

## 2023-02-23 RX ORDER — SIMVASTATIN 20 MG
20 TABLET ORAL AT BEDTIME
Qty: 90 TABLET | Refills: 1 | Status: SHIPPED | OUTPATIENT
Start: 2023-02-23 | End: 2023-08-03

## 2023-02-23 RX ORDER — CYANOCOBALAMIN 1000 UG/ML
1000 INJECTION, SOLUTION INTRAMUSCULAR; SUBCUTANEOUS ONCE
Status: COMPLETED | OUTPATIENT
Start: 2023-02-23 | End: 2023-02-23

## 2023-02-23 RX ADMIN — CYANOCOBALAMIN 1000 MCG: 1000 INJECTION, SOLUTION INTRAMUSCULAR; SUBCUTANEOUS at 08:52

## 2023-02-23 ASSESSMENT — PAIN SCALES - GENERAL: PAINLEVEL: NO PAIN (0)

## 2023-02-23 ASSESSMENT — ENCOUNTER SYMPTOMS
LIGHT-HEADEDNESS: 0
NAUSEA: 0
FEVER: 0
AGITATION: 0
HEADACHES: 1
ARTHRALGIAS: 0
CONSTIPATION: 0
CONFUSION: 0
JOINT SWELLING: 0
PARESTHESIAS: 0
WHEEZING: 0
EYE PAIN: 0
MYALGIAS: 0
HEARTBURN: 0
HEMATURIA: 0
WOUND: 0
CHILLS: 0
HEMATOCHEZIA: 0
BRUISES/BLEEDS EASILY: 0
WEAKNESS: 0
VOMITING: 0
DYSURIA: 0
SORE THROAT: 0
COUGH: 0
DIZZINESS: 0
APNEA: 1
SHORTNESS OF BREATH: 0
DIARRHEA: 0
NERVOUS/ANXIOUS: 0
FREQUENCY: 0
ABDOMINAL PAIN: 0
PALPITATIONS: 0

## 2023-02-23 NOTE — PATIENT INSTRUCTIONS
Blood pressure is well controlled.   Diabetes is well controlled.     Medications refilled.   Labs are stable and have improved.     Start trial of Lexapro 10 mg once daily -- for Mood.     B12 shot today.     Keep up the hard work!  Congratulations on the weight loss!    To help with mood..... Start low-dose Lexapro. 10 mg once daily.     Results for orders placed or performed in visit on 02/23/23   Prostate Specific Antigen     Status: Normal   Result Value Ref Range    PSA Tumor Marker 0.34 0.00 - 3.50 ng/mL    Narrative    This result is obtained using the Roche Elecsys total PSA method on the nerissa e601 immunoassay analyzer. Results obtained with different assay methods or kits cannot be used interchangeably.   Comprehensive metabolic panel     Status: Abnormal   Result Value Ref Range    Sodium 137 136 - 145 mmol/L    Potassium 4.2 3.4 - 5.3 mmol/L    Chloride 100 98 - 107 mmol/L    Carbon Dioxide (CO2) 28 22 - 29 mmol/L    Anion Gap 9 7 - 15 mmol/L    Urea Nitrogen 8.8 6.0 - 20.0 mg/dL    Creatinine 0.94 0.67 - 1.17 mg/dL    Calcium 9.4 8.6 - 10.0 mg/dL    Glucose 139 (H) 70 - 99 mg/dL    Alkaline Phosphatase 56 40 - 129 U/L    AST 18 10 - 50 U/L    ALT 13 10 - 50 U/L    Protein Total 6.9 6.4 - 8.3 g/dL    Albumin 4.3 3.5 - 5.2 g/dL    Bilirubin Total 0.6 <=1.2 mg/dL    GFR Estimate >90 >60 mL/min/1.73m2   Lipid Profile     Status: Normal   Result Value Ref Range    Cholesterol 114 <200 mg/dL    Triglycerides 144 <150 mg/dL    Direct Measure HDL 41 >=40 mg/dL    LDL Cholesterol Calculated 44 <=100 mg/dL    Non HDL Cholesterol 73 <130 mg/dL    Narrative    Cholesterol  Desirable:  <200 mg/dL    Triglycerides  Normal:  Less than 150 mg/dL  Borderline High:  150-199 mg/dL  High:  200-499 mg/dL  Very High:  Greater than or equal to 500 mg/dL    Direct Measure HDL  Female:  Greater than or equal to 50 mg/dL   Male:  Greater than or equal to 40 mg/dL    LDL Cholesterol  Desirable:  <100mg/dL  Above Desirable:   100-129 mg/dL   Borderline High:  130-159 mg/dL   High:  160-189 mg/dL   Very High:  >= 190 mg/dL    Non HDL Cholesterol  Desirable:  130 mg/dL  Above Desirable:  130-159 mg/dL  Borderline High:  160-189 mg/dL  High:  190-219 mg/dL  Very High:  Greater than or equal to 220 mg/dL   Albumin Random Urine Quantitative with Creat Ratio     Status: None   Result Value Ref Range    Creatinine Urine mg/dL 180.0 mg/dL    Albumin Urine mg/L 17.9 mg/L    Albumin Urine mg/g Cr 9.94 0.00 - 17.00 mg/g Cr   CBC with platelets     Status: Normal   Result Value Ref Range    WBC Count 5.6 4.0 - 11.0 10e3/uL    RBC Count 4.59 4.40 - 5.90 10e6/uL    Hemoglobin 14.0 13.3 - 17.7 g/dL    Hematocrit 40.9 40.0 - 53.0 %    MCV 89 78 - 100 fL    MCH 30.5 26.5 - 33.0 pg    MCHC 34.2 31.5 - 36.5 g/dL    RDW 11.9 10.0 - 15.0 %    Platelet Count 261 150 - 450 10e3/uL   Hemoglobin A1c     Status: Normal   Result Value Ref Range    Hemoglobin A1C 5.7 4.0 - 6.2 %   TSH with free T4 reflex     Status: Normal   Result Value Ref Range    TSH 1.19 0.30 - 4.20 uIU/mL   UA reflex to Microscopic     Status: Abnormal   Result Value Ref Range    Color Urine Yellow Colorless, Straw, Light Yellow, Yellow    Appearance Urine Clear Clear    Glucose Urine Negative Negative mg/dL    Bilirubin Urine Negative Negative    Ketones Urine Negative Negative mg/dL    Specific Gravity Urine 1.019 1.000 - 1.030    Blood Urine Trace (A) Negative    pH Urine 6.0 5.0 - 9.0    Protein Albumin Urine 10 (A) Negative mg/dL    Urobilinogen Urine Normal Normal, 2.0 mg/dL    Nitrite Urine Negative Negative    Leukocyte Esterase Urine Negative Negative    RBC Urine 1 <=2 /HPF    WBC Urine 2 <=5 /HPF    Mucus Urine Present (A) None Seen /LPF    Hyaline Casts Urine 4 (H) <=2 /LPF   Extra Tube     Status: None (In process)    Narrative    The following orders were created for panel order Extra Tube.  Procedure                               Abnormality         Status                      ---------                               -----------         ------                     Extra Serum Separator Tu...[[id number]]                      In process                   Please view results for these tests on the individual orders.      Aspects of Diabetes:   Recent Labs   Lab Test 02/23/23  0718 09/08/22  1456 06/06/22  1342   A1C 5.7 7.0* 7.1*   LDL 44 50 72   HDL 41 45 51   TRIG 144 300* 153*   ALT 13 28 26   CR 0.94 1.07 0.97   GFRESTIMATED >90 81 >90   POTASSIUM 4.2 4.2 4.1   TSH 1.19 1.20 1.13   WBC 5.6 7.6 6.0   HGB 14.0 14.3 13.8    268 283   ALBUMIN 4.3 4.6 4.7      Hemoglobin A1c  Goal range is under 8%. Best is 6.5 to 7   Blood Pressure 122/70 Goal to keep less than 140/90   Tobacco  reports that he quit smoking about 29 years ago. His smoking use included cigarettes. He has a 5.00 pack-year smoking history. His smokeless tobacco use includes chew. Goal to abstain from tobacco   Aspirin or Plavix Anti-platelet therapy Aspirin or Plavix reduces risk of heart disease and stroke  -- sometimes used with other blood thinners, depending on bleeding risk and risk factors.    ACE/ARB Specific blood pressure meds These medications can reduce risk of kidney disease   Cholesterol Statins (Lipitor, Crestor, vs others) Statins reduce risk of heart disease and stroke   Eye Exam -- Do Yearly -- Annual diabetic eye exam   Healthy weight Wt Readings from Last 4 Encounters:   02/23/23 91.3 kg (201 lb 3.2 oz)   09/08/22 99 kg (218 lb 3.2 oz)   12/01/21 103.3 kg (227 lb 12.8 oz)   09/17/20 100.8 kg (222 lb 4 oz)      Body mass index is 28.87 kg/m .  Goal BMI under 30, best is under 25.      -- Trying to exercise daily (goal at least 20 min/day) with moderate aerobic activity   -- Eat healthy (resources from ADA at http://www.diabetes.org/)   -- Taking good care of my feet. Consider seeing the Podiatrist   -- Check blood sugars as directed, record in log book and bring to every appointment    Insurance  companies are grading you and I on your blood sugar control -- Goal is to get your A1c down to 7.9% or lower and NO Smoking!  -- Medicare and most insurance companies, will only cover Hemoglobin A1c labs to be rechecked every 91+ days.      Return for Diabetes labs and clinic follow-up appointment every 3 to 4 months.    Schedule lab only appointment --- A few days AFTER: 5/24/23   Schedule clinic appointment with Dr. Celeste -- Same day as labs, or 1-2 days later.

## 2023-02-23 NOTE — PROGRESS NOTES
SUBJECTIVE:   CC: Wai is an 57 year old who presents for preventative health visit.   Patient has been advised of split billing requirements and indicates understanding: Yes  Healthy Habits:     Getting at least 3 servings of Calcium per day:  Yes    Bi-annual eye exam:  NO    Dental care twice a year:  NO    Sleep apnea or symptoms of sleep apnea:  Sleep apnea    Diet:  Regular (no restrictions)    Frequency of exercise:  2-3 days/week    Duration of exercise:  15-30 minutes    Taking medications regularly:  Yes    Medication side effects:  None    PHQ-2 Total Score: 1    Additional concerns today:  No  History of Present Illness       Diabetes:   He presents for follow up of diabetes.  He is checking home blood glucose a few times a week. He checks blood glucose before and after meals and at bedtime.  Blood glucose is never over 200 and never under 70. He is aware of hypoglycemia symptoms including other. He has no concerns regarding his diabetes at this time.  He is having numbness in feet and weight loss. The patient has not had a diabetic eye exam in the last 12 months.          Today's PHQ-2 Score:   PHQ-2 (  [employer]) 2023   Q1: Little interest or pleasure in doing things 1   Q2: Feeling down, depressed or hopeless 0   PHQ-2 Score 1   PHQ-2 Total Score (12-17 Years)- Positive if 3 or more points; Administer PHQ-A if positive -   Q1: Little interest or pleasure in doing things Several days   Q2: Feeling down, depressed or hopeless Not at all   PHQ-2 Score 1     Social History     Tobacco Use     Smoking status: Former     Packs/day: 0.50     Years: 10.00     Pack years: 5.00     Types: Cigarettes     Quit date: 1994     Years since quittin.1     Smokeless tobacco: Current     Types: Chew     Tobacco comments:      tin lasts a week, 30 years   Substance Use Topics     Alcohol use: Yes     Comment: occasional - 6 to a 8 week     Alcohol Use 2023   Prescreen: >3 drinks/day or >7  drinks/week? No     Last PSA:   PSA Tumor Marker   Date Value Ref Range Status   02/23/2023 0.34 0.00 - 3.50 ng/mL Final       Reviewed orders with patient. Reviewed health maintenance and updated orders accordingly - Yes    Reviewed and updated as needed this visit by clinical staff   Tobacco  Allergies  Meds  Problems  Med Hx  Surg Hx  Fam Hx  Soc   Hx        Reviewed and updated as needed this visit by Provider   Tobacco  Allergies  Meds  Problems  Med Hx  Surg Hx  Fam Hx           Review of Systems   Constitutional: Negative for chills and fever.        Weight down with Rybelus.   Wt Readings from Last 5 Encounters:  02/23/23 : 91.3 kg (201 lb 3.2 oz)  09/08/22 : 99 kg (218 lb 3.2 oz)  12/01/21 : 103.3 kg (227 lb 12.8 oz)  09/17/20 : 100.8 kg (222 lb 4 oz)  04/15/19 : 99.8 kg (220 lb)     HENT: Negative for congestion, ear pain, hearing loss and sore throat.    Eyes: Negative for pain and visual disturbance.   Respiratory: Positive for apnea (Uses CPAP regularly, finds helpful and beneficial). Negative for cough, shortness of breath and wheezing.    Cardiovascular: Negative for chest pain, palpitations and peripheral edema.   Gastrointestinal: Negative for abdominal pain, constipation, diarrhea, heartburn, hematochezia, nausea and vomiting.   Endocrine: Negative for cold intolerance and heat intolerance.        Not watching his diet that closely.   Genitourinary: Negative for dysuria, frequency, genital sores, hematuria, impotence, penile discharge and urgency.        + mild increased urinary frequency, can't hold it as long   Musculoskeletal: Negative for arthralgias, joint swelling and myalgias.        Mid foot and metatarsal foot pain, worse at night. Gabapentin has helped the pain.   Skin: Negative for rash and wound.   Allergic/Immunologic: Negative for immunocompromised state.   Neurological: Positive for headaches. Negative for dizziness, weakness, light-headedness and paresthesias.  "  Hematological: Does not bruise/bleed easily.   Psychiatric/Behavioral: Positive for mood changes. Negative for agitation and confusion. The patient is not nervous/anxious.         Mood has been a little down lately.       OBJECTIVE:   /70 (BP Location: Right arm, Patient Position: Sitting, Cuff Size: Adult Regular)   Pulse 78   Temp (!) 96.5  F (35.8  C) (Temporal)   Resp 20   Ht 1.778 m (5' 10\")   Wt 91.3 kg (201 lb 3.2 oz)   SpO2 97%   BMI 28.87 kg/m     Wt Readings from Last 5 Encounters:   02/23/23 91.3 kg (201 lb 3.2 oz)   09/08/22 99 kg (218 lb 3.2 oz)   12/01/21 103.3 kg (227 lb 12.8 oz)   09/17/20 100.8 kg (222 lb 4 oz)   04/15/19 99.8 kg (220 lb)      Physical Exam  Constitutional:       General: He is not in acute distress.     Appearance: He is well-developed. He is not diaphoretic.   HENT:      Head: Normocephalic and atraumatic.   Eyes:      General: No scleral icterus.     Conjunctiva/sclera: Conjunctivae normal.   Neck:      Vascular: No carotid bruit.   Cardiovascular:      Rate and Rhythm: Normal rate and regular rhythm.      Pulses: Normal pulses.   Pulmonary:      Effort: Pulmonary effort is normal.      Breath sounds: Normal breath sounds.   Abdominal:      Palpations: Abdomen is soft.      Tenderness: There is no abdominal tenderness.   Musculoskeletal:         General: No deformity.      Cervical back: Neck supple.      Right lower leg: No edema.      Left lower leg: No edema.   Lymphadenopathy:      Cervical: No cervical adenopathy.   Skin:     General: Skin is warm and dry.      Coloration: Skin is not jaundiced.      Findings: No rash.      Comments: + High foot arches   Neurological:      Mental Status: He is alert and oriented to person, place, and time. Mental status is at baseline.   Psychiatric:         Mood and Affect: Mood is depressed. Affect is flat.         Behavior: Behavior normal.         Recent Labs   Lab Test 02/23/23  0718 09/08/22  1456 06/06/22  1342   A1C " 5.7 7.0* 7.1*   LDL 44 50 72   HDL 41 45 51   TRIG 144 300* 153*   ALT 13 28 26   CR 0.94 1.07 0.97   GFRESTIMATED >90 81 >90   POTASSIUM 4.2 4.2 4.1   TSH 1.19 1.20 1.13   WBC 5.6 7.6 6.0   HGB 14.0 14.3 13.8    268 283   ALBUMIN 4.3 4.6 4.7     Hemoglobin A1c is well controlled at 5.7%.  LDL HDL and triglycerides are at goal.  ALT normal.  Creatinine has improved.  Potassium normal.  TSH normal.  CBC normal.  Albumin normal.    ASSESSMENT/PLAN:       ICD-10-CM    1. Type 2 diabetes mellitus with diabetic polyneuropathy, without long-term current use of insulin (H)  E11.42 Semaglutide 7 MG TABS     metFORMIN (GLUCOPHAGE) 1000 MG tablet     gabapentin (NEURONTIN) 100 MG capsule      2. Benign essential hypertension  I10 spironolactone (ALDACTONE) 25 MG tablet     metoprolol succinate ER (TOPROL XL) 50 MG 24 hr tablet     lisinopril (ZESTRIL) 20 MG tablet      3. Mixed hyperlipidemia  E78.2 simvastatin (ZOCOR) 20 MG tablet      4. High foot arch  Q66.70       5. REKHA on CPAP - uses daily, finds helpful and beneficial  G47.33     Z99.89       6. Nocturnal foot cramps  R25.2 Magnesium Cl-Calcium Carbonate (SLOW-MAG) 71.5-119 MG TBEC      7. Encounter for screening for malignant neoplasm of prostate  Z12.5 PSA Screen GH      8. Vitamin B12 deficiency  E53.8 cyanocobalamin injection 1,000 mcg     vitamin B-12 (CYANOCOBALAMIN) 2500 MCG sublingual tablet      9. Acute reaction to situational stress  F43.0 escitalopram (LEXAPRO) 10 MG tablet      10. Vaccine counseling  Z71.85       11. Annual physical exam  Z00.00       Patient presents for annual physical examination as well as follow-up multiple issues.    Type 2 diabetes with diabetic polyneuropathy.  Ongoing.  Still dealing with a lot of foot pain, foot cramps.  We discussed a few different treatment options.  Has found gabapentin helpful, generally just takes this in the evening.  We will try increasing the dose to see if this will make things any better.  Also  try some magnesium in the evening.  B12 shot today as well.  His previous B12 levels were a bit low.  If this is helpful, consider starting sublingual B12 tablets daily.  Blood sugars are now well controlled.  He has lost over 20 pounds almost 26 pounds since starting semaglutide tablets.  Only tolerates the 7 mg dose cannot tolerate the 14 mg.  Otherwise taking glipizide-metformin.  We will discontinue this and switch to plain metformin only, in addition to the gabapentin for neuropathy as noted.    Hypertension, blood pressure is currently well controlled.  Using combination of lisinopril, metoprolol, spironolactone.  Tolerating well.  Needs refills.  Denies syncope or presyncope.    Mixed hyperlipidemia.  Cholesterol levels are all at goal.  Doing well with simvastatin.  No changes for now.    Patient has high foot arches, wears inserts, custom for his diabetic shoes/high arches.    Sleep apnea, chronic, ongoing.  Uses CPAP machine nightly.  Finds helpful and beneficial.  Encouraged continued use.    Prostate lab cancer screening today.    Acute reaction to situational stress.  States that he is not enjoying things as much as he previously was.  He is not doing much for hobbies anymore.  His wife works out of town, he basically lives by himself with his cat.  Workers at work have told him that he seems more down or glum.  Wife has also evidently told him that he is not as smiling or happy as previously.  We discussed a few options.  Start trial of Lexapro 10 mg daily.  Okay to dose increase up or down slightly depending on clinical response.  Close clinic follow-up advised.    Vaccine counseling completed.  Encouraged annual vaccinations.    Patient has been advised of split billing requirements and indicates understanding: Yes      COUNSELING:   Reviewed preventive health counseling, as reflected in patient instructions  Special attention given to:        Regular exercise       Healthy diet/nutrition        "Vision screening       Hearing screening       Immunizations    BMI:   Estimated body mass index is 28.87 kg/m  as calculated from the following:    Height as of this encounter: 1.778 m (5' 10\").    Weight as of this encounter: 91.3 kg (201 lb 3.2 oz).         He reports that he quit smoking about 29 years ago. His smoking use included cigarettes. He has a 5.00 pack-year smoking history. His smokeless tobacco use includes chew.            oBo Celeste MD  [facility]  Answers for HPI/ROS submitted by the patient on 2/22/2023  If you checked off any problems, how difficult have these problems made it for you to do your work, take care of things at home, or get along with other people?: Somewhat difficult  PHQ9 TOTAL SCORE: 2  DEA 7 TOTAL SCORE: 3      "

## 2023-02-23 NOTE — NURSING NOTE
"Chief Complaint   Patient presents with     Physical     Diabetes         Initial /70 (BP Location: Right arm, Patient Position: Sitting, Cuff Size: Adult Regular)   Pulse 78   Temp (!) 96.5  F (35.8  C) (Temporal)   Resp 20   Ht 1.778 m (5' 10\")   Wt 91.3 kg (201 lb 3.2 oz)   SpO2 97%   BMI 28.87 kg/m   Estimated body mass index is 28.87 kg/m  as calculated from the following:    Height as of this encounter: 1.778 m (5' 10\").    Weight as of this encounter: 91.3 kg (201 lb 3.2 oz).       FOOD SECURITY SCREENING QUESTIONS:    The next two questions are to help us understand your food security.  If you are feeling you need any assistance in this area, we have resources available to support you today.    Hunger Vital Signs:  Within the past 12 months we worried whether our food would run out before we got money to buy more. Never  Within the past 12 months the food we bought just didn't last and we didn't have money to get more. Never    Advance Care Directive on file? no      Medication reconciliation complete.      Jorje Erickson,on 2/23/2023 at 7:49 AM        "

## 2023-03-13 DIAGNOSIS — E11.42 TYPE 2 DIABETES MELLITUS WITH DIABETIC POLYNEUROPATHY, WITHOUT LONG-TERM CURRENT USE OF INSULIN (H): ICD-10-CM

## 2023-03-15 NOTE — TELEPHONE ENCOUNTER
Last Prescription Date: 2/23/23  Last Qty/Refills: 180 / 1  Last Office Visit: 2/23/23  Future Office Visit: none     Verified at pharmacy.    Corinne R Thayer, RN on 3/15/2023 at 8:42 AM    Requested Prescriptions   Pending Prescriptions Disp Refills     metFORMIN (GLUCOPHAGE) 500 MG tablet [Pharmacy Med Name: METFORMIN  MG TABLET] 180 tablet 0     Sig: TAKE 1 TABLET (500 MG) BY MOUTH 2 TIMES DAILY (WITH MEALS) + TAKE WITH GLIPIZIDE-METFORMIN COMBO       Biguanide Agents Passed - 3/13/2023 12:46 AM        Passed - Patient is age 10 or older        Passed - Patient has documented A1c within the specified period of time.     If HgbA1C is 8 or greater, it needs to be on file within the past 3 months.  If less than 8, must be on file within the past 6 months.     Recent Labs   Lab Test 02/23/23 0718 08/21/18  0831 05/08/17  1627   A1C 5.7   < >  --    [id number]  --   --  6.1    < > = values in this interval not displayed.             Passed - Patient's CR is NOT>1.4 OR Patient's EGFR is NOT<45 within past 12 mos.     Recent Labs   Lab Test 02/23/23  0718 06/06/22  1342 09/17/20  1708   GFRESTIMATED >90   < > 84   GFRESTBLACK  --   --  >90    < > = values in this interval not displayed.       Recent Labs   Lab Test 02/23/23 0718   CR 0.94

## 2023-05-09 NOTE — LETTER
for prior authorization on:    Medication:TRUEplus Pen Needles 32G X 4 MM Misc    Dosage:USE TO INJECT INSULIN DAILY AS DIRECTED, REMOVE NEEDLE COVER(S) TO EXPOSE NEEDLE BEFORE INJECTING    Quantity requested:  100    Pharmacy for prescription has been selected.    [id number] - [place], [place] - [address] AT [facility]   November 27, 2019      Willis Oliveira  [address] [address] [id number]          Dear Willis,      Your pharmacy has requested a refill of Simvastatin we have approved a one time audi refill and has been sent to your pharmacy.      According to our records, you are overdue for annual medication management and labs. Please contact our scheduling line at [phone] to set up this appointment at your earliest convenience for any further refills.     Thank you for choosing [facility] and [facility] for your health care needs.     Sincerely,        The Refill Nurse

## 2023-07-14 ENCOUNTER — OFFICE VISIT (OUTPATIENT)
Dept: FAMILY MEDICINE | Facility: OTHER | Age: 58
End: 2023-07-14
Payer: COMMERCIAL

## 2023-07-14 VITALS
TEMPERATURE: 98.3 F | BODY MASS INDEX: 27.72 KG/M2 | RESPIRATION RATE: 20 BRPM | HEART RATE: 67 BPM | WEIGHT: 193.2 LBS | OXYGEN SATURATION: 98 % | DIASTOLIC BLOOD PRESSURE: 70 MMHG | SYSTOLIC BLOOD PRESSURE: 126 MMHG
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
## 2023-07-14 DIAGNOSIS — L03.113 CELLULITIS OF RIGHT UPPER EXTREMITY: ICD-10-CM

## 2023-07-14 DIAGNOSIS — S40.261A TICK BITE OF RIGHT SHOULDER, INITIAL ENCOUNTER: Primary | ICD-10-CM

## 2023-07-14 PROCEDURE — 99213 OFFICE O/P EST LOW 20 MIN: CPT | Performed by: STUDENT IN AN ORGANIZED HEALTH CARE EDUCATION/TRAINING PROGRAM

## 2023-07-14 RX ORDER — DOXYCYCLINE 100 MG/1
100 CAPSULE ORAL 2 TIMES DAILY
Qty: 14 CAPSULE | Refills: 0 | Status: SHIPPED | OUTPATIENT
Start: 2023-07-14 | End: 2023-07-21

## 2023-07-14 ASSESSMENT — PAIN SCALES - GENERAL: PAINLEVEL: MILD PAIN (2)

## 2023-07-14 NOTE — PROGRESS NOTES
Assessment & Plan     (S40.261A,  W57.XXXA) Tick bite of right shoulder, initial encounter  (primary encounter diagnosis)  Comment: Tick bite on the right shoulder, happened approximately one week ago.  Symptoms continue to worsen.  Most likely cellulitis secondary to tick bite.    Plan: doxycycline hyclate (VIBRAMYCIN) 100 MG capsule    Plan to treat with doxycycline twice a day for 7 days.  Heat and/or ice.  Ibuprofen and or Tylenol.  Follow-up with PCP if symptoms persist.  Return to rapid clinic if symptoms worsen or change.     (L03.113) Cellulitis of right upper extremity  Comment: Cellulitis most likely secondary to tick bite.    Plan: doxycycline hyclate (VIBRAMYCIN) 100 MG capsule    I explained my diagnostic considerations. Discussed risks and benefits of treatments offered. Patient agrees with treatment plan. If symptoms worsen or change, patient should return or go to the ER. Follow up with primary care provider as needed.      Stephanie Kaminski PA-C  [facility]   Wai is a 57 year old, presenting for the following health issues:  Tick Bite (Right shoulder )      HPI     Patient presents today with concerns of tick bite on his right shoulder.  States he is not sure what kind of tick.  States this bite happened about a week ago.  When it happened, there was only a small area of redness.  After that, he continued to get more redness and swelling over the course of the week.      Review of Systems   Constitutional, HEENT, cardiovascular, pulmonary, gi and gu systems are negative, except as otherwise noted.      Objective    /70   Pulse 67   Temp 98.3  F (36.8  C)   Resp 20   Wt 87.6 kg (193 lb 3.2 oz)   SpO2 98%   BMI 27.72 kg/m    Body mass index is 27.72 kg/m .     Physical Exam   GENERAL: healthy, alert and no distress  RESP: No increased work of breathing  Integumentary: Approximately 2 inch x 1-1/2 inch area of erythema on the posterior right  shoulder that is edematous, tender to palpation, slightly warm to the touch, slight scabbing on the top of the area, no drainage or bleeding  MS: no gross musculoskeletal defects noted, no edema

## 2023-07-14 NOTE — NURSING NOTE
Patient here for tick bite on the right shoulder, it burns and itches. He said it has become re and raised. Medication Reconciliation: complete.    Anusha Palacios LPN  7/14/2023 11:56 AM

## 2023-07-14 NOTE — PATIENT INSTRUCTIONS
Cellulitis    Take doxycycline twice a day for seven days for infection.  Will cause sun sensitivity, take with food.     Neosporin/Triple Antibiotic ointment.    Hydrocortisone for itch.    Follow up with PCP if symptoms persist.  Return to rapid clinic if symptoms worsen or change.

## 2023-08-01 DIAGNOSIS — I10 BENIGN ESSENTIAL HYPERTENSION: ICD-10-CM

## 2023-08-01 DIAGNOSIS — E78.2 MIXED HYPERLIPIDEMIA: ICD-10-CM

## 2023-08-01 NOTE — TELEPHONE ENCOUNTER
"  Last Prescription Date: 2/23/23  Last Qty/Refills: 90 / 1  Last Office Visit: 2/23/23  Future Office Visit: none    Patient is due for diabetic followup as noted in last office visit. Will route to unit 3 scheduling to call patient to get this setup     Corinne R Thayer, RN on 8/1/2023 at 3:47 PM    Requested Prescriptions   Pending Prescriptions Disp Refills    spironolactone (ALDACTONE) 25 MG tablet [Pharmacy Med Name: SPIRONOLACTONE 25 MG TABLET] 90 tablet 1     Sig: TAKE 1 TABLET BY MOUTH EVERY DAY       Diuretics (Including Combos) Protocol Passed - 8/1/2023  7:06 AM        Passed - Blood pressure under 140/90 in past 12 months     BP Readings from Last 3 Encounters:   07/14/23 126/70   02/23/23 122/70   09/08/22 128/74                 Passed - Recent (12 mo) or future (30 days) visit within the authorizing provider's specialty     Patient has had an office visit with the authorizing provider or a provider within the authorizing providers department within the previous 12 mos or has a future within next 30 days. See \"Patient Info\" tab in inbasket, or \"Choose Columns\" in Meds & Orders section of the refill encounter.              Passed - Medication is active on med list        Passed - Patient is age 18 or older        Passed - Normal serum creatinine on file in past 12 months     Recent Labs   Lab Test 02/23/23  0718   CR 0.94              Passed - Normal serum potassium on file in past 12 months     Recent Labs   Lab Test 02/23/23  0718   POTASSIUM 4.2                    Passed - Normal serum sodium on file in past 12 months     Recent Labs   Lab Test 02/23/23  0718                   lisinopril (ZESTRIL) 20 MG tablet [Pharmacy Med Name: LISINOPRIL 20 MG TABLET] 90 tablet 1     Sig: TAKE 1 TABLET BY MOUTH EVERY DAY       ACE Inhibitors (Including Combos) Protocol Passed - 8/1/2023  7:06 AM        Passed - Blood pressure under 140/90 in past 12 months     BP Readings from Last 3 Encounters:   07/14/23 " "126/70   02/23/23 122/70   09/08/22 128/74                 Passed - Recent (12 mo) or future (30 days) visit within the authorizing provider's specialty     Patient has had an office visit with the authorizing provider or a provider within the authorizing providers department within the previous 12 mos or has a future within next 30 days. See \"Patient Info\" tab in inbasket, or \"Choose Columns\" in Meds & Orders section of the refill encounter.              Passed - Medication is active on med list        Passed - Patient is age 18 or older        Passed - Normal serum creatinine on file in past 12 months     Recent Labs   Lab Test 02/23/23  0718   CR 0.94       Ok to refill medication if creatinine is low          Passed - Normal serum potassium on file in past 12 months     Recent Labs   Lab Test 02/23/23  0718   POTASSIUM 4.2               metoprolol succinate ER (TOPROL XL) 50 MG 24 hr tablet [Pharmacy Med Name: METOPROLOL SUCC ER 50 MG TAB] 90 tablet 1     Sig: TAKE 1 TABLET BY MOUTH EVERY DAY       Beta-Blockers Protocol Passed - 8/1/2023  7:06 AM        Passed - Blood pressure under 140/90 in past 12 months     BP Readings from Last 3 Encounters:   07/14/23 126/70   02/23/23 122/70   09/08/22 128/74                 Passed - Patient is age 6 or older        Passed - Recent (12 mo) or future (30 days) visit within the authorizing provider's specialty     Patient has had an office visit with the authorizing provider or a provider within the authorizing providers department within the previous 12 mos or has a future within next 30 days. See \"Patient Info\" tab in inbasket, or \"Choose Columns\" in Meds & Orders section of the refill encounter.              Passed - Medication is active on med list          simvastatin (ZOCOR) 20 MG tablet [Pharmacy Med Name: SIMVASTATIN 20 MG TABLET] 90 tablet 1     Sig: TAKE 1 TABLET BY MOUTH AT BEDTIME       Statins Protocol Passed - 8/1/2023  7:06 AM        Passed - LDL on file in " "past 12 months     Recent Labs   Lab Test 02/23/23  0718   LDL 44             Passed - No abnormal creatine kinase in past 12 months     No lab results found.             Passed - Recent (12 mo) or future (30 days) visit within the authorizing provider's specialty     Patient has had an office visit with the authorizing provider or a provider within the authorizing providers department within the previous 12 mos or has a future within next 30 days. See \"Patient Info\" tab in inbasket, or \"Choose Columns\" in Meds & Orders section of the refill encounter.              Passed - Medication is active on med list        Passed - Patient is age 18 or older             "

## 2023-08-03 RX ORDER — LISINOPRIL 20 MG/1
TABLET ORAL
Qty: 7 TABLET | Refills: 7 | Status: SHIPPED | OUTPATIENT
Start: 2023-08-03 | End: 2023-09-11

## 2023-08-03 RX ORDER — SIMVASTATIN 20 MG
TABLET ORAL
Qty: 7 TABLET | Refills: 7 | Status: SHIPPED | OUTPATIENT
Start: 2023-08-03 | End: 2023-10-18

## 2023-08-03 RX ORDER — SPIRONOLACTONE 25 MG/1
TABLET ORAL
Qty: 7 TABLET | Refills: 7 | Status: SHIPPED | OUTPATIENT
Start: 2023-08-03 | End: 2023-09-11

## 2023-08-03 RX ORDER — METOPROLOL SUCCINATE 50 MG/1
TABLET, EXTENDED RELEASE ORAL
Qty: 7 TABLET | Refills: 7 | Status: SHIPPED | OUTPATIENT
Start: 2023-08-03 | End: 2023-09-11

## 2023-08-27 ENCOUNTER — HOSPITAL ENCOUNTER (EMERGENCY)
Facility: OTHER | Age: 58
Discharge: HOME | End: 2023-08-27
Attending: PHYSICIAN ASSISTANT | Admitting: PHYSICIAN ASSISTANT
Payer: COMMERCIAL

## 2023-08-27 ENCOUNTER — APPOINTMENT (OUTPATIENT)
Dept: GENERAL RADIOLOGY | Facility: OTHER | Age: 58
End: 2023-08-27
Attending: FAMILY MEDICINE
Payer: COMMERCIAL

## 2023-08-27 VITALS
OXYGEN SATURATION: 97 % | RESPIRATION RATE: 16 BRPM | BODY MASS INDEX: 27.63 KG/M2 | HEIGHT: 70 IN | DIASTOLIC BLOOD PRESSURE: 98 MMHG | WEIGHT: 193 LBS | TEMPERATURE: 98.4 F | HEART RATE: 74 BPM | SYSTOLIC BLOOD PRESSURE: 141 MMHG

## 2023-08-27 DIAGNOSIS — S66.822A EXTENSOR TENDON LACERATION OF LEFT HAND WITH OPEN WOUND, INITIAL ENCOUNTER: ICD-10-CM

## 2023-08-27 DIAGNOSIS — S61.402A EXTENSOR TENDON LACERATION OF LEFT HAND WITH OPEN WOUND, INITIAL ENCOUNTER: ICD-10-CM

## 2023-08-27 DIAGNOSIS — T14.8XXA PUNCTURE WOUND: ICD-10-CM

## 2023-08-27 PROCEDURE — 12001 RPR S/N/AX/GEN/TRNK 2.5CM/<: CPT | Performed by: PHYSICIAN ASSISTANT

## 2023-08-27 PROCEDURE — 99207 PR NO CHARGE LOS: CPT | Performed by: PHYSICIAN ASSISTANT

## 2023-08-27 PROCEDURE — 90715 TDAP VACCINE 7 YRS/> IM: CPT | Performed by: PHYSICIAN ASSISTANT

## 2023-08-27 PROCEDURE — 250N000009 HC RX 250: Performed by: PHYSICIAN ASSISTANT

## 2023-08-27 PROCEDURE — 99283 EMERGENCY DEPT VISIT LOW MDM: CPT | Mod: 25 | Performed by: PHYSICIAN ASSISTANT

## 2023-08-27 PROCEDURE — 73140 X-RAY EXAM OF FINGER(S): CPT | Mod: LT

## 2023-08-27 PROCEDURE — 250N000011 HC RX IP 250 OP 636: Performed by: PHYSICIAN ASSISTANT

## 2023-08-27 PROCEDURE — 90471 IMMUNIZATION ADMIN: CPT | Performed by: PHYSICIAN ASSISTANT

## 2023-08-27 RX ORDER — CEPHALEXIN 500 MG/1
500 CAPSULE ORAL 2 TIMES DAILY
Qty: 10 CAPSULE | Refills: 0 | Status: SHIPPED | OUTPATIENT
Start: 2023-08-27 | End: 2023-09-01

## 2023-08-27 RX ORDER — LIDOCAINE HYDROCHLORIDE AND EPINEPHRINE 10; 10 MG/ML; UG/ML
10 INJECTION, SOLUTION INFILTRATION; PERINEURAL ONCE
Status: COMPLETED | OUTPATIENT
Start: 2023-08-27 | End: 2023-08-27

## 2023-08-27 RX ORDER — GINSENG 100 MG
500 CAPSULE ORAL ONCE
Status: COMPLETED | OUTPATIENT
Start: 2023-08-27 | End: 2023-08-27

## 2023-08-27 RX ADMIN — BACITRACIN 1 G: 500 OINTMENT TOPICAL at 14:25

## 2023-08-27 RX ADMIN — CLOSTRIDIUM TETANI TOXOID ANTIGEN (FORMALDEHYDE INACTIVATED), CORYNEBACTERIUM DIPHTHERIAE TOXOID ANTIGEN (FORMALDEHYDE INACTIVATED), BORDETELLA PERTUSSIS TOXOID ANTIGEN (GLUTARALDEHYDE INACTIVATED), BORDETELLA PERTUSSIS FILAMENTOUS HEMAGGLUTININ ANTIGEN (FORMALDEHYDE INACTIVATED), BORDETELLA PERTUSSIS PERTACTIN ANTIGEN, AND BORDETELLA PERTUSSIS FIMBRIAE 2/3 ANTIGEN 0.5 ML: 5; 2; 2.5; 5; 3; 5 INJECTION, SUSPENSION INTRAMUSCULAR at 13:44

## 2023-08-27 RX ADMIN — LIDOCAINE HYDROCHLORIDE,EPINEPHRINE BITARTRATE 10 ML: 10; .01 INJECTION, SOLUTION INFILTRATION; PERINEURAL at 14:25

## 2023-08-27 NOTE — DISCHARGE INSTRUCTIONS
- Keep wound clean and dry.    - Wash with soap and water, apply antibiotic ointment like petroleum jelly/bacitracin, and then apply new dressing twice a day.    - Return to the ED for any signs of infection to include increasing redness, increasing swelling, increasing pain, discharge, fever, or any other new or worsening symptoms.   - Follow up with your provider in 10-14 days for suture removal.  Return to the ED if new or worsening symptoms.  -You were given 4 sutures today.

## 2023-08-27 NOTE — ED PROVIDER NOTES
"      EMERGENCY DEPARTMENT ENCOUNTER      NAME: Willis Oliveira  AGE: 57 year old male  YOB: 1965  MRN: [id number]  EVALUATION DATE & TIME: 8/27/2023  1:27 PM    PCP: Boo Celeste    ED PROVIDER: Low Rossi PA-C       CHIEF COMPLAINT:  Chief Complaint   Patient presents with    Laceration       ED COURSE, MEDICAL DECISION MAKING, FINAL IMPRESSION AND PLAN:     The patient was interviewed and examined.  HPI and physical exam as below.  Differential diagnosis and MDM Key Documentation Elements as below.  Vitals and triage note were reviewed.  BP (!) 141/98   Pulse 74   Temp 98.4  F (36.9  C) (Tympanic)   Resp 16   Ht 1.778 m (5' 10\")   Wt 87.5 kg (193 lb)   SpO2 97%   BMI 27.69 kg/m      Willis Oliveira is a pleasant left-hand-dominant 57 year old male who presents to the ER today with left hand injury.  Patient states that he was at [employer].  He was cutting a lumbar bundle with his pocket knife when it slipped causing him to have a puncture wound to the thenar eminence of his left hand.  Patient with bleeding which is controlled with direct pressure.  Having swelling throughout the thenar eminence and discomfort from swelling.  Denying any weakness.  No numbness or paresthesias.  Last tetanus shot 2013    Differential includes but is not limited to puncture wound/laceration, retained foreign body, tendon injury, nerve injury, vascular injury    Puncture wound to the left thenar eminence without signs of tendon injury or vascular injury.  Patient is neurovascular intact.  X-rays were negative for fracture or foreign bodies.  Hand was soaked in Hibiclens water for 15 minutes.  Wound was irrigated.  Closed with 4 sutures.  Lateral thenar eminence closed with three 5-0 nylon sutures.  Thenar eminence over the palm closed with 1 horizontal mattress suture.  Hand was dressed.  Patient tolerated procedure well.  Tetanus updated    Assessment:  1. Puncture wound    2. Extensor tendon " laceration of left hand with open wound, initial encounter      Plan:  -Sutures out in 10 to 14 days  -Wound care discussed.  -Keflex 500 mg every 12 hours for 5 days  -Follow-up with primary care for suture removal.  Follow-up with hand surgeon if any worsening of symptoms    Pertinent Labs & Imaging studies reviewed. (See chart for details)  Results for orders placed or performed during the hospital encounter of 08/27/23   XR Finger Port Left G/E 2 Views    Impression    IMPRESSION: Negative study.    HERLINDA MENDOZA MD         SYSTEM ID:  [id number]     No results found for: ABORH    Medications given during today's ER visit:  Medications   Tdap (tetanus-diphtheria-acell pertussis) (ADACEL) injection 0.5 mL (0.5 mLs Intramuscular $Given 8/27/23 1344)   lidocaine 1% with EPINEPHrine 1:100,000 injection 10 mL (10 mLs Intradermal $Given 8/27/23 1425)   bacitracin ointment 1 g (1 g Topical $Given 8/27/23 1425)       New prescriptions started at today's ER visit  New Prescriptions    No medications on file     Reassessments, Medications, Interventions, & Response to Treatments:  Pain resolved with use of local anesthesia.  Wound care discussed.  Treatment plan of follow-up discussed    Consultations:  None    Decision Rules, Medical Calculators, and Risk Stratification Tools:  None    MDM Key Documentation Elements for Patient's Evaluation:  Differential diagnosis to include high risk considerations: As above  Escalation to admission/observation considered: Admission/observation considered, but patient does not meet admission criteria  Discussions and management with other clinicians:    3a. Independent interpretation of testing performed by another health professional:  -No  3b. Discussion of management or test interpretation with another health professional: -No  Independent interpretation of tests:  Ordering and/or review of 1 test(s)  Discussion of test interpretations with radiology:  No  History obtained from  source other than patient or assessment requiring an independent historian:  No  Review of non-ED/external records:  review of 1 records  Diagnostic tests considered but not ultimately performed/deferred:  -CBC  Prescription medications considered but not prescribed:  -Opiates  Chronic conditions affecting care:  -None  Care affected by social determinants of health:  -None    The patient's management involved:   - Imaging studies  - Decision regarding minor procedures (laceration repair)    A shared decision making model was used. Time was taken to answer all questions.  Patient and/or associated parties understood and were agreeable to treatment plan.  Plan and all results were discussed. Warning signs and close return precautions to return to the ED given. Copy of results given. Discharged in stable condition. Discharged with discharge instructions outlining plan for further care and follow up.      PPE worn during patient evaluation:  Mask: No  Eye Protection: No  Gown: No  Hair cover: No  Face Shield: No  Patient wearing a mask: No    =================================================================    HPI  Willis Oliveira is a pleasant left-hand-dominant 57 year old male who presents to the ER today with left hand injury.  Patient states that he was at [employer].  He was cutting a lumbar bundle with his pocket knife when it slipped causing him to have a puncture wound to the thenar eminence of his left hand.  Patient with bleeding which is controlled with direct pressure.  Having swelling throughout the thenar eminence and discomfort from swelling.  Denying any weakness.  No numbness or paresthesias.  Last tetanus shot 2013      REVIEW OF SYSTEMS   Review of Systems  As above, otherwise ROS is unremarkable.      PAST MEDICAL HISTORY:  Past Medical History:   Diagnosis Date    Hyperlipidemia     Hyperlipidemia, goal LDL is less than 160    Impingement syndrome of left shoulder     8/5/2016    Lumbar sprain      2004, Low back injury at work, muscle strain, Dr. Howard    Other shoulder lesions, left shoulder     8/5/2016    Other specified postprocedural states     8/11/2016    Personal history of other endocrine, nutritional and metabolic disease     2004,History of hypokalemia secondary to lisinopril/hydrochlorothiazide combination medication, potassium 2.9, normalized off medication    Primary osteoarthritis of shoulder     8/5/2016    Sprain of other specified parts of left shoulder girdle, initial encounter     8/4/2016       PAST SURGICAL HISTORY:  Past Surgical History:   Procedure Laterality Date    ARTHROSCOPY KNEE      1980,Arthroscopic surgery of the left knee x 2, meniscus and lateral release    ARTHROSCOPY KNEE      1981,Lateral release, right knee    COLONOSCOPY  05/18/2017    Hyperplastic polyps  ,follow up 2027    OTHER SURGICAL HISTORY      2001,207512,SLEEP STUDY PER PROTOCOL,Sleep study, [facility]    OTHER SURGICAL HISTORY      8/11/2016,142606,OTHER,Left       CURRENT MEDICATIONS:    Current Outpatient Medications   Medication Instructions    alcohol swab prep pads Use to swab area of injection/bebo as directed.    aspirin 81 mg, Oral, DAILY WITH FOOD    blood glucose (NO BRAND SPECIFIED) test strip 1 strip, In Vitro, 4 TIMES DAILY, Use to test blood sugar 4 times daily   DX: E11.9    blood glucose calibration (NO BRAND SPECIFIED) solution To accompany: Blood Glucose Monitor Brands: ACCU-CHEK meter    blood glucose monitoring (ACCU-CHEK JAMAL PLUS) meter device kit Check glucose once daily    Blood Glucose Monitoring Suppl (SURECHEK BLOOD GLUCOSE MONITOR) W/DEVICE KIT Dispense item covered by pt ins. 250.02 NIDDM type II, uncontrolled - Test 4 times/day. Reason: High A1C    escitalopram (LEXAPRO) 10 mg, Oral, DAILY, - for mood    gabapentin (NEURONTIN) 100 MG capsule Take 3-4 capsules (300-400 mg) by mouth At Bedtime. May also take 1 capsule (100 mg) 3 times daily as needed for neuropathic  pain.    lancets 28G MISC Dispense item covered by pt ins. 250.02 NIDDM type II, uncontrolled - Test 4 times/day. Reason: High A1C    lisinopril (ZESTRIL) 20 MG tablet Take 1 tablet by mouth every day. Needs diabetic labs and clinic follow-up appointment.    Magnesium Cl-Calcium Carbonate (SLOW-MAG) 71.5-119 MG TBEC 1-2 tablets, Oral, EVERY EVENING, - for foot cramp prevention    metFORMIN (GLUCOPHAGE) 1,000 mg, Oral, 2 TIMES DAILY WITH MEALS, Change Metformin - once you run out of Glipizide-metformin combo tablets -    metoprolol succinate ER (TOPROL XL) 50 MG 24 hr tablet Take 1 tablet by mouth every day. Needs diabetic labs and clinic follow-up appointment.    Multiple Vitamin (MULTI-VITAMINS) TABS 1 tablet, Oral, DAILY    Semaglutide (RYBELSUS) 7 mg, Oral, DAILY, -- Don't increase 14 mg tablet (caused nausea/vomiting)    simvastatin (ZOCOR) 20 MG tablet Take 1 tablet by mouth at bedtime. Needs diabetic labs and clinic follow-up appointment.    spironolactone (ALDACTONE) 25 MG tablet Take 1 tablet by mouth every day. Needs diabetic labs and clinic follow-up appointment.    thin (NO BRAND SPECIFIED) lancets Use with lanceting device to test 4 times daily. Dispense as covered by patient's insurance.    vitamin B-12 (CYANOCOBALAMIN) 2,500 mcg, Oral, DAILY, --- Consider Starting ---       ALLERGIES:  Allergies   Allergen Reactions    Liraglutide      Victoza:Other reaction(s): Other - Describe In Comment Field  abd pain    Penicillins Rash     Other reaction(s): Other - Describe In Comment Field  Itchy eyes       FAMILY HISTORY:  Family History   Problem Relation Age of Onset    Hypertension Father         Hypertension    Diabetes Father         Diabetes    Heart Disease Father         Heart Disease,pacemaker    Cancer Mother         Cancer,metastatic cancer, type is unknown    Other - See Comments Mother         smoker    Heart Disease Mother 58        Heart Disease,stent    Other - See Comments Brother           "hemochromatosis    Hypertension Brother         Hypertension    Diabetes Brother         Diabetes    Hyperlipidemia Brother         Hyperlipidemia,hyperlipidemia    Hypertension Brother         Hypertension    Family History Negative Brother         Good Health    Genetic Disorder Other         Genetic,No Known FHx of colon Cancer    Heart Disease Brother 48        Heart Disease,?MI    Anesthesia Reaction No family hx of         Anesthesia Problem    Blood Disease No family hx of         Blood Disease,no bleeding or clotting       SOCIAL HISTORY:   Social History     Socioeconomic History    Marital status:      Spouse name: Mary   Tobacco Use    Smoking status: Former     Packs/day: 0.50     Years: 10.00     Pack years: 5.00     Types: Cigarettes     Quit date: 1994     Years since quittin.6    Smokeless tobacco: Current     Types: Chew    Tobacco comments:      tin lasts a week, 30 years   Vaping Use    Vaping Use: Never used   Substance and Sexual Activity    Alcohol use: Yes     Comment: occasional - 6 to a 8 week    Drug use: No    Sexual activity: Yes     Partners: Female   Social History Narrative     - Wife Mary, one daughter, lives in [place], [place], 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       PHYSICAL EXAM    VITAL SIGNS: BP (!) 141/98   Pulse 74   Temp 98.4  F (36.9  C) (Tympanic)   Resp 16   Ht 1.778 m (5' 10\")   Wt 87.5 kg (193 lb)   SpO2 97%   BMI 27.69 kg/m      Patient Vitals for the past 24 hrs:   BP Temp Temp src Pulse Resp SpO2 Height Weight   23 1322 (!) 141/98 98.4  F (36.9  C) Tympanic 74 16 97 % 1.778 m (5' 10\") 87.5 kg (193 lb)       Physical Exam  Vitals and nursing note reviewed.   Constitutional:       General: Active.   HENT:      Nose: Nose normal.      Mouth/Throat:      Mouth: Mucous membranes are moist.      Pharynx: Oropharynx is clear.   Eyes:      Conjunctiva/sclera: Conjunctivae normal.   Musculoskeletal:      Comments: " Patient with a puncture wound to the thenar eminence.  Entry wound site measuring 1 cm and the exit wound measuring 0.5 cm.  No signs retained foreign body.  No bony, tendon, nerve, or vascular involvement.  Patient able to fully flex and extend all digits.  Sensation to light touch intact.   strength 5/5.  Skin:     General: Skin is warm and dry.   Neurological:      General: No focal deficit present.      Mental Status: Alert and oriented for age.   Psychiatric:         Mood and Affect: Mood normal.      LABS & RADIOLOGY:  All pertinent labs reviewed and interpreted. Reviewed all pertinent imaging. Please see official radiology report.  Results for orders placed or performed during the hospital encounter of 08/27/23   XR Finger Port Left G/E 2 Views    Impression    IMPRESSION: Negative study.    HERLINDA MENDOZA MD         SYSTEM ID:  [id number]     XR Finger Port Left G/E 2 Views   Final Result   IMPRESSION: Negative study.      HERLINDA MENDOZA MD            SYSTEM ID:  [id number]                PROCEDURES:   INFORMED CONSENT  Discussed the risks, benefits, alternatives, and the necessity of other members of the [facility] team participating in the procedure. All questions answered and informed consent obtained.    UNIVERSAL PROTOCOL  Procedural pause conducted to verify: Correct patient identity, procedure to be performed, and as applicable, correct side and site, correct patient position, and availability of implants, special equipment, or special requirements.    [facility]    -Laceration Repair    Date/Time: 8/27/2023 2:46 PM    Performed by: Low Rossi PA-C  Authorized by: Low Rossi PA-C    Risks, benefits and alternatives discussed.      ANESTHESIA (see MAR for exact dosages):     Anesthesia method:  Local infiltration    Local anesthetic:  Lidocaine 1% WITH epi (4 cc given with good local anesthesia obtained)  LACERATION DETAILS     Wound length  (cm): 1 cm laceration to the lateral left thenar eminence and a 0.5 cm laceration to the palmar thenar eminence secondary to puncture wound.    REPAIR TYPE:     Repair type:  Simple    EXPLORATION:     Hemostasis achieved with:  Direct pressure    Wound exploration: wound explored through full range of motion and entire depth of wound probed and visualized      Wound extent: no foreign body, no nerve damage, no tendon damage, no underlying fracture and no vascular damage      Contaminated: no      TREATMENT:     Area cleansed with:  Hibiclens and soap and water    Amount of cleaning:  Standard    Irrigation solution:  Sterile saline    Irrigation method:  Pressure wash    Visualized foreign bodies/material removed: no      SKIN REPAIR     Repair method:  Sutures    Suture size:  5-0    Suture material:  Nylon    Suture technique:  Simple interrupted and horizontal mattress    Number of sutures:  4    APPROXIMATION     Approximation:  Close    POST-PROCEDURE DETAILS     Dressing:  Sterile dressing and antibiotic ointment      PROCEDURE    Patient Tolerance:  Patient tolerated the procedure well with no immediate complications        Eitan GARCIA PA-C, personally performed the services described in this documentation, and it is both accurate and complete.       Low Rossi PA-C  08/27/23 6519

## 2023-08-27 NOTE — ED TRIAGE NOTES
Pt presents to ED with c/o cutting left hand. Left thumb has two puncture points where pt said it went in one side and out the other. Tdap 2013    Modesta Kay RN on 8/27/2023 at 1:21 PM       Triage Assessment       Row Name 08/27/23 1320       Triage Assessment (Adult)    Airway WDL WDL       Respiratory WDL    Respiratory WDL WDL       Skin Circulation/Temperature WDL    Skin Circulation/Temperature WDL X  puncture to left thumb       Peripheral/Neurovascular WDL    Peripheral Neurovascular WDL WDL

## 2023-09-11 ENCOUNTER — ALLIED HEALTH/NURSE VISIT (OUTPATIENT)
Dept: FAMILY MEDICINE | Facility: OTHER | Age: 58
End: 2023-09-11
Attending: INTERNAL MEDICINE
Payer: COMMERCIAL

## 2023-09-11 ENCOUNTER — LAB (OUTPATIENT)
Dept: LAB | Facility: OTHER | Age: 58
End: 2023-09-11
Attending: INTERNAL MEDICINE
Payer: COMMERCIAL

## 2023-09-11 DIAGNOSIS — E78.2 MIXED HYPERLIPIDEMIA: ICD-10-CM

## 2023-09-11 DIAGNOSIS — E11.9 TYPE 2 DIABETES MELLITUS WITHOUT COMPLICATION, WITHOUT LONG-TERM CURRENT USE OF INSULIN (H): ICD-10-CM

## 2023-09-11 DIAGNOSIS — I10 BENIGN ESSENTIAL HYPERTENSION: ICD-10-CM

## 2023-09-11 DIAGNOSIS — Z48.02 ENCOUNTER FOR REMOVAL OF SUTURES: Primary | ICD-10-CM

## 2023-09-11 DIAGNOSIS — Z12.5 ENCOUNTER FOR SCREENING FOR MALIGNANT NEOPLASM OF PROSTATE: ICD-10-CM

## 2023-09-11 LAB
ALBUMIN SERPL BCG-MCNC: 4.6 G/DL (ref 3.5–5.2)
ALBUMIN UR-MCNC: NEGATIVE MG/DL
ALP SERPL-CCNC: 60 U/L (ref 40–129)
ALT SERPL W P-5'-P-CCNC: 14 U/L (ref 0–70)
ANION GAP SERPL CALCULATED.3IONS-SCNC: 10 MMOL/L (ref 7–15)
APPEARANCE UR: CLEAR
AST SERPL W P-5'-P-CCNC: 20 U/L (ref 0–45)
BILIRUB SERPL-MCNC: 0.4 MG/DL
BILIRUB UR QL STRIP: NEGATIVE
BUN SERPL-MCNC: 13.3 MG/DL (ref 6–20)
CALCIUM SERPL-MCNC: 9.7 MG/DL (ref 8.6–10)
CHLORIDE SERPL-SCNC: 101 MMOL/L (ref 98–107)
CHOLEST SERPL-MCNC: 157 MG/DL
COLOR UR AUTO: YELLOW
CREAT SERPL-MCNC: 1 MG/DL (ref 0.67–1.17)
CREAT UR-MCNC: 14.8 MG/DL
DEPRECATED HCO3 PLAS-SCNC: 27 MMOL/L (ref 22–29)
EGFRCR SERPLBLD CKD-EPI 2021: 88 ML/MIN/1.73M2
ERYTHROCYTE [DISTWIDTH] IN BLOOD BY AUTOMATED COUNT: 12.1 % (ref 10–15)
GLUCOSE SERPL-MCNC: 107 MG/DL (ref 70–99)
GLUCOSE UR STRIP-MCNC: NEGATIVE MG/DL
HBA1C MFR BLD: 6.5 % (ref 4–6.2)
HCT VFR BLD AUTO: 39 % (ref 40–53)
HDLC SERPL-MCNC: 64 MG/DL
HGB BLD-MCNC: 13.4 G/DL (ref 13.3–17.7)
HGB UR QL STRIP: NEGATIVE
KETONES UR STRIP-MCNC: NEGATIVE MG/DL
LDLC SERPL CALC-MCNC: 68 MG/DL
LEUKOCYTE ESTERASE UR QL STRIP: NEGATIVE
MCH RBC QN AUTO: 31.4 PG (ref 26.5–33)
MCHC RBC AUTO-ENTMCNC: 34.4 G/DL (ref 31.5–36.5)
MCV RBC AUTO: 91 FL (ref 78–100)
MICROALBUMIN UR-MCNC: <12 MG/L
MICROALBUMIN/CREAT UR: NORMAL MG/G{CREAT}
NITRATE UR QL: NEGATIVE
NONHDLC SERPL-MCNC: 93 MG/DL
PH UR STRIP: 6 [PH] (ref 5–9)
PLATELET # BLD AUTO: 252 10E3/UL (ref 150–450)
POTASSIUM SERPL-SCNC: 4.3 MMOL/L (ref 3.4–5.3)
PROT SERPL-MCNC: 7.1 G/DL (ref 6.4–8.3)
PSA SERPL DL<=0.01 NG/ML-MCNC: 0.35 NG/ML (ref 0–3.5)
RBC # BLD AUTO: 4.27 10E6/UL (ref 4.4–5.9)
SODIUM SERPL-SCNC: 138 MMOL/L (ref 136–145)
SP GR UR STRIP: 1 (ref 1–1.03)
TRIGL SERPL-MCNC: 127 MG/DL
TSH SERPL DL<=0.005 MIU/L-ACNC: 1.34 UIU/ML (ref 0.3–4.2)
UROBILINOGEN UR STRIP-MCNC: NORMAL MG/DL
WBC # BLD AUTO: 7.9 10E3/UL (ref 4–11)

## 2023-09-11 PROCEDURE — 82570 ASSAY OF URINE CREATININE: CPT | Mod: ZL

## 2023-09-11 PROCEDURE — 84443 ASSAY THYROID STIM HORMONE: CPT | Mod: ZL

## 2023-09-11 PROCEDURE — 80061 LIPID PANEL: CPT | Mod: ZL

## 2023-09-11 PROCEDURE — 81003 URINALYSIS AUTO W/O SCOPE: CPT | Mod: ZL

## 2023-09-11 PROCEDURE — 80053 COMPREHEN METABOLIC PANEL: CPT | Mod: ZL

## 2023-09-11 PROCEDURE — 85027 COMPLETE CBC AUTOMATED: CPT | Mod: ZL

## 2023-09-11 PROCEDURE — 83036 HEMOGLOBIN GLYCOSYLATED A1C: CPT | Mod: ZL

## 2023-09-11 PROCEDURE — G0103 PSA SCREENING: HCPCS | Mod: ZL

## 2023-09-11 PROCEDURE — 36415 COLL VENOUS BLD VENIPUNCTURE: CPT | Mod: ZL

## 2023-09-11 PROCEDURE — 84153 ASSAY OF PSA TOTAL: CPT | Mod: ZL

## 2023-09-11 NOTE — PROGRESS NOTES
Willis Oliveira presents to the clinic today for removal of sutures.  The patient has had the sutures in place for 15 days.  There has been no history of infection or drainage.  4 sutures are seen located on the L hand- 3 on the lateral thenar eminence and 1 on the thenar eminence over the palm . The wound is healing well with no signs of infection.  Tetanus status is up to date.   All sutures were easily removed today.  Routine wound care discussed.  The patient will follow up as needed.    Neli Mcdermott RN on 9/11/2023 at 4:05 PM

## 2023-09-11 NOTE — TELEPHONE ENCOUNTER
Pharmacy is requesting 90 day supply.     Antoinette Nixon RN .............. 9/11/2023  11:50 AM

## 2023-09-14 DIAGNOSIS — E11.42 TYPE 2 DIABETES MELLITUS WITH DIABETIC POLYNEUROPATHY, WITHOUT LONG-TERM CURRENT USE OF INSULIN (H): ICD-10-CM

## 2023-09-15 RX ORDER — METOPROLOL SUCCINATE 50 MG/1
TABLET, EXTENDED RELEASE ORAL
Qty: 90 TABLET | Refills: 0 | Status: SHIPPED | OUTPATIENT
Start: 2023-09-15 | End: 2023-12-15

## 2023-09-15 RX ORDER — ORAL SEMAGLUTIDE 7 MG/1
TABLET ORAL
Qty: 90 TABLET | Refills: 1 | Status: SHIPPED | OUTPATIENT
Start: 2023-09-15 | End: 2024-02-23

## 2023-09-15 RX ORDER — SPIRONOLACTONE 25 MG/1
TABLET ORAL
Qty: 90 TABLET | Refills: 0 | Status: SHIPPED | OUTPATIENT
Start: 2023-09-15 | End: 2023-12-15

## 2023-09-15 RX ORDER — LISINOPRIL 20 MG/1
TABLET ORAL
Qty: 90 TABLET | Refills: 0 | Status: SHIPPED | OUTPATIENT
Start: 2023-09-15 | End: 2023-12-15

## 2023-09-15 RX ORDER — GABAPENTIN 100 MG/1
100 CAPSULE ORAL 3 TIMES DAILY PRN
Qty: 90 CAPSULE | Refills: 6 | Status: SHIPPED | OUTPATIENT
Start: 2023-09-15 | End: 2024-02-23

## 2023-10-17 ENCOUNTER — MYC MEDICAL ADVICE (OUTPATIENT)
Dept: INTERNAL MEDICINE | Facility: OTHER | Age: 58
End: 2023-10-17
Payer: COMMERCIAL

## 2023-10-17 DIAGNOSIS — E11.42 TYPE 2 DIABETES MELLITUS WITH DIABETIC POLYNEUROPATHY, WITHOUT LONG-TERM CURRENT USE OF INSULIN (H): ICD-10-CM

## 2023-10-17 DIAGNOSIS — E78.2 MIXED HYPERLIPIDEMIA: Primary | ICD-10-CM

## 2023-10-17 DIAGNOSIS — I10 BENIGN ESSENTIAL HYPERTENSION: ICD-10-CM

## 2023-10-17 DIAGNOSIS — Z12.5 ENCOUNTER FOR SCREENING FOR MALIGNANT NEOPLASM OF PROSTATE: ICD-10-CM

## 2023-10-18 RX ORDER — SIMVASTATIN 20 MG
TABLET ORAL
Qty: 30 TABLET | Refills: 0 | Status: SHIPPED | OUTPATIENT
Start: 2023-10-18 | End: 2023-10-23

## 2023-10-18 NOTE — TELEPHONE ENCOUNTER
Noted on prescription itself from date 8/3/23:  Needs diabetic labs and clinic follow-up appointment.     simvastatin (ZOCOR) 20 MG tablet   Last Prescription Date:   8/3/23  Last Fill Qty/Refills:         7, R-7    gabapentin (NEURONTIN) 100 MG capsule     Order 9/15/23 for 90 caps and 6 refills, should have on file at pharmacy. Will not refill at this time. Will update patient.    Isabel Stevens RN on 10/18/2023 at 8:12 AM

## 2023-10-18 NOTE — TELEPHONE ENCOUNTER
Spoke to patient and set him up with annual appointment in February. Would like this nurse to see if Dr. Celeste would order labs for this day as well to be completed before appointment.     Isabel Stevens RN on 10/18/2023 at 9:54 AM

## 2023-10-18 NOTE — TELEPHONE ENCOUNTER
"[facility] sent Rx request for the following:      Requested Prescriptions   Pending Prescriptions Disp Refills    metFORMIN (GLUCOPHAGE) 1000 MG tablet [Pharmacy Med Name: METFORMIN HCL 1,000 MG TABLET] 180 tablet 1     Sig: TAKE 1 TAB BY MOUTH 2X DAILY W/ MEALS (START AFTER OUT OF COMBINATION TABLETS)       Biguanide Agents Failed - 10/17/2023  6:06 PM        Failed - Recent (6 mo) or future (30 days) visit within the authorizing provider's specialty     Patient had office visit in the last 6 months or has a visit in the next 30 days with authorizing provider or within the authorizing provider's specialty.  See \"Patient Info\" tab in inbasket, or \"Choose Columns\" in Meds & Orders section of the refill encounter.              Last Prescription Date:   2/23/23  Last Fill Qty/Refills:         180, R-1    Last Office Visit:              2/23/23   Future Office visit:           None    Isabel Stevens RN on 10/18/2023 at 8:49 AM        "

## 2023-10-23 RX ORDER — SIMVASTATIN 20 MG
TABLET ORAL
Qty: 90 TABLET | Refills: 1 | Status: SHIPPED | OUTPATIENT
Start: 2023-10-23 | End: 2024-02-23

## 2023-10-25 DIAGNOSIS — E11.42 TYPE 2 DIABETES MELLITUS WITH DIABETIC POLYNEUROPATHY, WITHOUT LONG-TERM CURRENT USE OF INSULIN (H): Primary | ICD-10-CM

## 2023-11-28 ENCOUNTER — TRANSFERRED RECORDS (OUTPATIENT)
Dept: HEALTH INFORMATION MANAGEMENT | Facility: OTHER | Age: 58
End: 2023-11-28
Payer: COMMERCIAL

## 2023-11-28 LAB — RETINOPATHY: NEGATIVE

## 2023-12-15 DIAGNOSIS — I10 BENIGN ESSENTIAL HYPERTENSION: ICD-10-CM

## 2023-12-15 RX ORDER — SPIRONOLACTONE 25 MG/1
TABLET ORAL
Qty: 90 TABLET | Refills: 0 | Status: SHIPPED | OUTPATIENT
Start: 2023-12-15 | End: 2024-02-23

## 2023-12-15 RX ORDER — METOPROLOL SUCCINATE 50 MG/1
TABLET, EXTENDED RELEASE ORAL
Qty: 90 TABLET | Refills: 0 | Status: SHIPPED | OUTPATIENT
Start: 2023-12-15 | End: 2024-02-23

## 2023-12-15 RX ORDER — LISINOPRIL 20 MG/1
TABLET ORAL
Qty: 90 TABLET | Refills: 0 | Status: SHIPPED | OUTPATIENT
Start: 2023-12-15 | End: 2024-02-23

## 2023-12-15 NOTE — TELEPHONE ENCOUNTER
[id number] [employer] [facility] [place] sent Rx request for the following:      Requested Prescriptions   Pending Prescriptions Disp Refills    metoprolol succinate ER (TOPROL XL) 50 MG 24 hr tablet [Pharmacy Med Name: METOPROLOL SUCC ER 50 MG TAB] 90 tablet 0     Sig: TAKE 1 TABLET BY MOUTH EVERY DAY. NEEDS DIABETIC LABS AND CLINIC FOLLOW-UP APPOINTMENT.   Last Prescription Date:   9/15/23  Last Fill Qty/Refills:         90, R-0      Beta-Blockers Protocol Failed - 12/15/2023  1:46 PM        Failed - Blood pressure under 140/90 in past 12 months     BP Readings from Last 3 Encounters:   08/27/23 (!) 141/98   07/14/23 126/70   02/23/23 122/70          lisinopril (ZESTRIL) 20 MG tablet [Pharmacy Med Name: LISINOPRIL 20 MG TABLET] 90 tablet 0     Sig: TAKE 1 TABLET BY MOUTH EVERY DAY. NEEDS DIABETIC LABS AND CLINIC FOLLOW-UP APPOINTMENT.   Last Prescription Date:   9/15/23  Last Fill Qty/Refills:         90, R-0      ACE Inhibitors (Including Combos) Protocol Failed - 12/15/2023  1:46 PM        Failed - Blood pressure under 140/90 in past 12 months     BP Readings from Last 3 Encounters:   08/27/23 (!) 141/98   07/14/23 126/70   02/23/23 122/70      Last Office Visit:              2/23/23   Future Office visit:             Next 5 appointments (look out 90 days)       Feb 21, 2024  8:00 AM  PHYSICAL with Boo Celeste MD  [facility] ([facility] ) [address]  [place] [id number] [phone]             Unable to complete prescription refill per RN Medication Refill Policy.      Routing to covering provider for refill consideration, as PCP/provider is out of clinic >48 hours or Pt is completely out of medication and provider is out of the clinic today.     Antoinette Nixon RN .............. 12/15/2023  1:47 PM

## 2023-12-15 NOTE — TELEPHONE ENCOUNTER
[id number] [employer] [facility] [place] sent Rx request for the following:      Requested Prescriptions   Pending Prescriptions Disp Refills    spironolactone (ALDACTONE) 25 MG tablet [Pharmacy Med Name: SPIRONOLACTONE 25 MG TABLET] 90 tablet 0     Sig: TAKE 1 TABLET BY MOUTH EVERY DAY. NEEDS DIABETIC LABS AND CLINIC FOLLOW-UP APPOINTMENT.       Diuretics (Including Combos) Protocol Failed - 12/15/2023  1:47 PM        Failed - Blood pressure under 140/90 in past 12 months     BP Readings from Last 3 Encounters:   08/27/23 (!) 141/98   07/14/23 126/70   02/23/23 122/70        Last Prescription Date:   9/15/23  Last Fill Qty/Refills:         90, R-0    Last Office Visit:              2/23/23   Future Office visit:             Next 5 appointments (look out 90 days)      Feb 21, 2024  8:00 AM  PHYSICAL with Boo Celeste MD  [facility] ([facility] ) [address]  [facility] [id number] [phone]          Unable to complete prescription refill per RN Medication Refill Policy.     Routing to covering provider for refill consideration, as PCP/provider is out of clinic >48 hours or Pt is completely out of medication and provider is out of the clinic today.    Antoinette Nixon RN .............. 12/15/2023  1:47 PM

## 2024-01-02 NOTE — TELEPHONE ENCOUNTER
Patient states he will call back to make his annual visit with Dr. Celeste.     Stacey Mayes on 9/16/2021 at 10:54 AM     3

## 2024-02-15 SDOH — HEALTH STABILITY: PHYSICAL HEALTH: ON AVERAGE, HOW MANY MINUTES DO YOU ENGAGE IN EXERCISE AT THIS LEVEL?: 10 MIN

## 2024-02-15 SDOH — HEALTH STABILITY: PHYSICAL HEALTH: ON AVERAGE, HOW MANY DAYS PER WEEK DO YOU ENGAGE IN MODERATE TO STRENUOUS EXERCISE (LIKE A BRISK WALK)?: 5 DAYS

## 2024-02-15 ASSESSMENT — SOCIAL DETERMINANTS OF HEALTH (SDOH): HOW OFTEN DO YOU GET TOGETHER WITH FRIENDS OR RELATIVES?: TWICE A WEEK

## 2024-02-21 ENCOUNTER — LAB (OUTPATIENT)
Dept: LAB | Facility: OTHER | Age: 59
End: 2024-02-21
Attending: INTERNAL MEDICINE
Payer: COMMERCIAL

## 2024-02-21 DIAGNOSIS — E11.42 TYPE 2 DIABETES MELLITUS WITH DIABETIC POLYNEUROPATHY, WITHOUT LONG-TERM CURRENT USE OF INSULIN (H): ICD-10-CM

## 2024-02-21 DIAGNOSIS — E78.2 MIXED HYPERLIPIDEMIA: ICD-10-CM

## 2024-02-21 DIAGNOSIS — Z12.5 ENCOUNTER FOR SCREENING FOR MALIGNANT NEOPLASM OF PROSTATE: ICD-10-CM

## 2024-02-21 LAB
ALBUMIN SERPL BCG-MCNC: 4.8 G/DL (ref 3.5–5.2)
ALBUMIN UR-MCNC: NEGATIVE MG/DL
ALP SERPL-CCNC: 64 U/L (ref 40–150)
ALT SERPL W P-5'-P-CCNC: 13 U/L (ref 0–70)
ANION GAP SERPL CALCULATED.3IONS-SCNC: 9 MMOL/L (ref 7–15)
APPEARANCE UR: CLEAR
AST SERPL W P-5'-P-CCNC: 17 U/L (ref 0–45)
BILIRUB SERPL-MCNC: 0.5 MG/DL
BILIRUB UR QL STRIP: NEGATIVE
BUN SERPL-MCNC: 8.8 MG/DL (ref 6–20)
CALCIUM SERPL-MCNC: 9.8 MG/DL (ref 8.6–10)
CHLORIDE SERPL-SCNC: 101 MMOL/L (ref 98–107)
CHOLEST SERPL-MCNC: 122 MG/DL
COLOR UR AUTO: YELLOW
CREAT SERPL-MCNC: 0.82 MG/DL (ref 0.67–1.17)
CREAT UR-MCNC: 24.3 MG/DL
DEPRECATED HCO3 PLAS-SCNC: 28 MMOL/L (ref 22–29)
EGFRCR SERPLBLD CKD-EPI 2021: >90 ML/MIN/1.73M2
ERYTHROCYTE [DISTWIDTH] IN BLOOD BY AUTOMATED COUNT: 12 % (ref 10–15)
FASTING STATUS PATIENT QL REPORTED: ABNORMAL
GLUCOSE SERPL-MCNC: 109 MG/DL (ref 70–99)
GLUCOSE UR STRIP-MCNC: NEGATIVE MG/DL
HBA1C MFR BLD: 6.5 % (ref 4–6.2)
HCT VFR BLD AUTO: 41.3 % (ref 40–53)
HDLC SERPL-MCNC: 45 MG/DL
HGB BLD-MCNC: 13.6 G/DL (ref 13.3–17.7)
HGB UR QL STRIP: NEGATIVE
KETONES UR STRIP-MCNC: NEGATIVE MG/DL
LDLC SERPL CALC-MCNC: 47 MG/DL
LEUKOCYTE ESTERASE UR QL STRIP: NEGATIVE
MCH RBC QN AUTO: 29.4 PG (ref 26.5–33)
MCHC RBC AUTO-ENTMCNC: 32.9 G/DL (ref 31.5–36.5)
MCV RBC AUTO: 89 FL (ref 78–100)
MICROALBUMIN UR-MCNC: <12 MG/L
MICROALBUMIN/CREAT UR: NORMAL MG/G{CREAT}
NITRATE UR QL: NEGATIVE
NONHDLC SERPL-MCNC: 77 MG/DL
PH UR STRIP: 6.5 [PH] (ref 5–9)
PLATELET # BLD AUTO: 269 10E3/UL (ref 150–450)
POTASSIUM SERPL-SCNC: 4.4 MMOL/L (ref 3.4–5.3)
PROT SERPL-MCNC: 7.4 G/DL (ref 6.4–8.3)
PSA SERPL DL<=0.01 NG/ML-MCNC: 0.41 NG/ML (ref 0–3.5)
RBC # BLD AUTO: 4.62 10E6/UL (ref 4.4–5.9)
SODIUM SERPL-SCNC: 138 MMOL/L (ref 135–145)
SP GR UR STRIP: 1 (ref 1–1.03)
TRIGL SERPL-MCNC: 152 MG/DL
TSH SERPL DL<=0.005 MIU/L-ACNC: 1.15 UIU/ML (ref 0.3–4.2)
UROBILINOGEN UR STRIP-MCNC: NORMAL MG/DL
WBC # BLD AUTO: 7.3 10E3/UL (ref 4–11)

## 2024-02-21 PROCEDURE — 81003 URINALYSIS AUTO W/O SCOPE: CPT | Mod: ZL

## 2024-02-21 PROCEDURE — 84155 ASSAY OF PROTEIN SERUM: CPT | Mod: ZL

## 2024-02-21 PROCEDURE — G0103 PSA SCREENING: HCPCS | Mod: ZL

## 2024-02-21 PROCEDURE — 84443 ASSAY THYROID STIM HORMONE: CPT | Mod: ZL

## 2024-02-21 PROCEDURE — 82040 ASSAY OF SERUM ALBUMIN: CPT | Mod: ZL

## 2024-02-21 PROCEDURE — 83036 HEMOGLOBIN GLYCOSYLATED A1C: CPT | Mod: ZL

## 2024-02-21 PROCEDURE — 36415 COLL VENOUS BLD VENIPUNCTURE: CPT | Mod: ZL

## 2024-02-21 PROCEDURE — 85027 COMPLETE CBC AUTOMATED: CPT | Mod: ZL

## 2024-02-21 PROCEDURE — 82570 ASSAY OF URINE CREATININE: CPT | Mod: ZL

## 2024-02-21 PROCEDURE — 80061 LIPID PANEL: CPT | Mod: ZL

## 2024-02-21 SDOH — HEALTH STABILITY: PHYSICAL HEALTH: ON AVERAGE, HOW MANY DAYS PER WEEK DO YOU ENGAGE IN MODERATE TO STRENUOUS EXERCISE (LIKE A BRISK WALK)?: 5 DAYS

## 2024-02-21 SDOH — HEALTH STABILITY: PHYSICAL HEALTH: ON AVERAGE, HOW MANY MINUTES DO YOU ENGAGE IN EXERCISE AT THIS LEVEL?: 10 MIN

## 2024-02-21 ASSESSMENT — ANXIETY QUESTIONNAIRES
GAD7 TOTAL SCORE: 0
GAD7 TOTAL SCORE: 0
IF YOU CHECKED OFF ANY PROBLEMS ON THIS QUESTIONNAIRE, HOW DIFFICULT HAVE THESE PROBLEMS MADE IT FOR YOU TO DO YOUR WORK, TAKE CARE OF THINGS AT HOME, OR GET ALONG WITH OTHER PEOPLE: NOT DIFFICULT AT ALL
2. NOT BEING ABLE TO STOP OR CONTROL WORRYING: NOT AT ALL
1. FEELING NERVOUS, ANXIOUS, OR ON EDGE: NOT AT ALL
6. BECOMING EASILY ANNOYED OR IRRITABLE: NOT AT ALL
GAD7 TOTAL SCORE: 0
8. IF YOU CHECKED OFF ANY PROBLEMS, HOW DIFFICULT HAVE THESE MADE IT FOR YOU TO DO YOUR WORK, TAKE CARE OF THINGS AT HOME, OR GET ALONG WITH OTHER PEOPLE?: NOT DIFFICULT AT ALL
3. WORRYING TOO MUCH ABOUT DIFFERENT THINGS: NOT AT ALL
7. FEELING AFRAID AS IF SOMETHING AWFUL MIGHT HAPPEN: NOT AT ALL
7. FEELING AFRAID AS IF SOMETHING AWFUL MIGHT HAPPEN: NOT AT ALL
5. BEING SO RESTLESS THAT IT IS HARD TO SIT STILL: NOT AT ALL
4. TROUBLE RELAXING: NOT AT ALL

## 2024-02-21 ASSESSMENT — PATIENT HEALTH QUESTIONNAIRE - PHQ9
SUM OF ALL RESPONSES TO PHQ QUESTIONS 1-9: 0
SUM OF ALL RESPONSES TO PHQ QUESTIONS 1-9: 0
10. IF YOU CHECKED OFF ANY PROBLEMS, HOW DIFFICULT HAVE THESE PROBLEMS MADE IT FOR YOU TO DO YOUR WORK, TAKE CARE OF THINGS AT HOME, OR GET ALONG WITH OTHER PEOPLE: NOT DIFFICULT AT ALL

## 2024-02-21 ASSESSMENT — SOCIAL DETERMINANTS OF HEALTH (SDOH): HOW OFTEN DO YOU GET TOGETHER WITH FRIENDS OR RELATIVES?: TWICE A WEEK

## 2024-02-23 ENCOUNTER — OFFICE VISIT (OUTPATIENT)
Dept: INTERNAL MEDICINE | Facility: OTHER | Age: 59
End: 2024-02-23
Attending: INTERNAL MEDICINE
Payer: COMMERCIAL

## 2024-02-23 VITALS
DIASTOLIC BLOOD PRESSURE: 70 MMHG | HEIGHT: 69 IN | RESPIRATION RATE: 16 BRPM | WEIGHT: 193.4 LBS | TEMPERATURE: 98 F | HEART RATE: 73 BPM | SYSTOLIC BLOOD PRESSURE: 128 MMHG | BODY MASS INDEX: 28.64 KG/M2 | OXYGEN SATURATION: 98 %

## 2024-02-23 DIAGNOSIS — E78.2 MIXED HYPERLIPIDEMIA: ICD-10-CM

## 2024-02-23 DIAGNOSIS — L84 PRE-ULCERATIVE CALLUSES: ICD-10-CM

## 2024-02-23 DIAGNOSIS — Z71.85 VACCINE COUNSELING: ICD-10-CM

## 2024-02-23 DIAGNOSIS — I10 BENIGN ESSENTIAL HYPERTENSION: ICD-10-CM

## 2024-02-23 DIAGNOSIS — G47.33 OSA ON CPAP: ICD-10-CM

## 2024-02-23 DIAGNOSIS — F43.0 ACUTE REACTION TO SITUATIONAL STRESS: ICD-10-CM

## 2024-02-23 DIAGNOSIS — E53.8 VITAMIN B12 DEFICIENCY: ICD-10-CM

## 2024-02-23 DIAGNOSIS — M79.672 BILATERAL FOOT PAIN: ICD-10-CM

## 2024-02-23 DIAGNOSIS — E11.42 TYPE 2 DIABETES MELLITUS WITH DIABETIC POLYNEUROPATHY, WITHOUT LONG-TERM CURRENT USE OF INSULIN (H): Primary | ICD-10-CM

## 2024-02-23 DIAGNOSIS — Z00.00 ANNUAL PHYSICAL EXAM: ICD-10-CM

## 2024-02-23 DIAGNOSIS — M79.671 BILATERAL FOOT PAIN: ICD-10-CM

## 2024-02-23 DIAGNOSIS — Q66.70 HIGH FOOT ARCH: ICD-10-CM

## 2024-02-23 PROCEDURE — 99214 OFFICE O/P EST MOD 30 MIN: CPT | Mod: 25 | Performed by: INTERNAL MEDICINE

## 2024-02-23 PROCEDURE — 99396 PREV VISIT EST AGE 40-64: CPT | Performed by: INTERNAL MEDICINE

## 2024-02-23 RX ORDER — SIMVASTATIN 20 MG
20 TABLET ORAL AT BEDTIME
Qty: 90 TABLET | Refills: 1 | Status: SHIPPED | OUTPATIENT
Start: 2024-02-23 | End: 2024-07-25

## 2024-02-23 RX ORDER — ESCITALOPRAM OXALATE 10 MG/1
10 TABLET ORAL DAILY
Qty: 90 TABLET | Refills: 1 | Status: SHIPPED | OUTPATIENT
Start: 2024-02-23 | End: 2024-07-25

## 2024-02-23 RX ORDER — CYANOCOBALAMIN (VITAMIN B-12) 2500 MCG
2500 TABLET, SUBLINGUAL SUBLINGUAL DAILY
Qty: 200 TABLET | Refills: 1 | Status: SHIPPED | OUTPATIENT
Start: 2024-02-23

## 2024-02-23 RX ORDER — LISINOPRIL 20 MG/1
20 TABLET ORAL DAILY
Qty: 90 TABLET | Refills: 1 | Status: SHIPPED | OUTPATIENT
Start: 2024-02-23 | End: 2024-07-25

## 2024-02-23 RX ORDER — SPIRONOLACTONE 25 MG/1
25 TABLET ORAL DAILY
Qty: 90 TABLET | Refills: 1 | Status: SHIPPED | OUTPATIENT
Start: 2024-02-23 | End: 2024-07-12

## 2024-02-23 RX ORDER — METOPROLOL SUCCINATE 50 MG/1
50 TABLET, EXTENDED RELEASE ORAL DAILY
Qty: 90 TABLET | Refills: 1 | Status: SHIPPED | OUTPATIENT
Start: 2024-02-23 | End: 2024-07-25

## 2024-02-23 RX ORDER — ORAL SEMAGLUTIDE 7 MG/1
7 TABLET ORAL DAILY
Qty: 90 TABLET | Refills: 1 | Status: SHIPPED | OUTPATIENT
Start: 2024-02-23 | End: 2024-07-25

## 2024-02-23 ASSESSMENT — ENCOUNTER SYMPTOMS
COUGH: 0
WOUND: 0
FEVER: 0
PARESTHESIAS: 0
NAUSEA: 0
APNEA: 1
HEADACHES: 1
LIGHT-HEADEDNESS: 0
SORE THROAT: 0
HEARTBURN: 0
FREQUENCY: 0
NERVOUS/ANXIOUS: 0
WHEEZING: 0
VOMITING: 0
DYSURIA: 0
HEMATOCHEZIA: 0
HEMATURIA: 0
CHILLS: 0
CONSTIPATION: 0
CONFUSION: 0
AGITATION: 0
WEAKNESS: 0
MYALGIAS: 0
DIZZINESS: 0
DIARRHEA: 0
ABDOMINAL PAIN: 0
PALPITATIONS: 0
BRUISES/BLEEDS EASILY: 0
EYE PAIN: 0
JOINT SWELLING: 0
ARTHRALGIAS: 1
SHORTNESS OF BREATH: 0

## 2024-02-23 ASSESSMENT — PAIN SCALES - GENERAL: PAINLEVEL: SEVERE PAIN (6)

## 2024-02-23 NOTE — NURSING NOTE
"Chief Complaint   Patient presents with    Physical       Initial /70 (BP Location: Right arm, Patient Position: Sitting, Cuff Size: Adult Regular)   Pulse 73   Temp 98  F (36.7  C) (Temporal)   Resp 16   Ht 1.746 m (5' 8.75\")   Wt 87.7 kg (193 lb 6.4 oz)   SpO2 98%   BMI 28.77 kg/m   Estimated body mass index is 28.77 kg/m  as calculated from the following:    Height as of this encounter: 1.746 m (5' 8.75\").    Weight as of this encounter: 87.7 kg (193 lb 6.4 oz).  Medication Review: complete    The next two questions are to help us understand your food security.  If you are feeling you need any assistance in this area, we have resources available to support you today.          2/21/2024   SDOH- Food Insecurity   Within the past 12 months, did you worry that your food would run out before you got money to buy more? N   Within the past 12 months, did the food you bought just not last and you didn t have money to get more? N         Health Care Directive:  Patient does not have a Health Care Directive or Living Will: Discussed advance care planning with patient; however, patient declined at this time.    Jorje Neff      "

## 2024-02-23 NOTE — PATIENT INSTRUCTIONS
Blood pressure is well controlled.   Diabetes is well controlled.     Medications refilled.   Labs are stable.       Immunization History   Administered Date(s) Administered    COVID-19 12+ (2023-24) (MODERNA) 12/19/2023    COVID-19 Bivalent 18+ (Moderna) 10/26/2022, 12/19/2023    COVID-19 MONOVALENT 12+ (Pfizer) 03/25/2021, 04/14/2021    COVID-19 Monovalent 18+ (Moderna) 12/13/2021    Influenza (IIV3) PF 04/05/2011, 10/13/2011, 10/04/2012    Influenza Vaccine 18-64 (Flublok) 12/01/2021    Influenza Vaccine >6 months,quad, PF 10/03/2013, 10/23/2014, 10/23/2015, 10/03/2016, 10/02/2017, 10/01/2018, 09/30/2019, 09/17/2020, 10/03/2022, 09/26/2023    TD,PF 7+ (Tenivac) 07/24/1995    TDAP (Adacel,Boostrix) 08/27/2023    TDAP Vaccine (Boostrix) 07/02/2013      Consider:  -- Yearly - COVID-19 Vaccination shot   -- Annual Flu / Influenza vaccination - Every Fall (Starting about October 1st)    Consider:  -- RSV vaccine for age 60+   (If Medicare insurance - get vaccine at the Pharmacy.     Private insurance may be able to get in clinic with clinic shot nurse.)  --- Check Cost $$$    -- Get your tetanus shot updated - from one of the local pharmacies, at your convenience -- Check cost/coverage.     -- Get the new shingles shot (2 in series) (Shingrix) - from one of the local pharmacies, at your convenience -- Check cost/coverage.         Lab on 02/21/2024   Component Date Value Ref Range Status    Sodium 02/21/2024 138  135 - 145 mmol/L Final    Reference intervals for this test were updated on 09/26/2023 to more accurately reflect our healthy population. There may be differences in the flagging of prior results with similar values performed with this method. Interpretation of those prior results can be made in the context of the updated reference intervals.     Potassium 02/21/2024 4.4  3.4 - 5.3 mmol/L Final    Carbon Dioxide (CO2) 02/21/2024 28  22 - 29 mmol/L Final    Anion Gap 02/21/2024 9  7 - 15 mmol/L Final    Urea  Nitrogen 02/21/2024 8.8  6.0 - 20.0 mg/dL Final    Creatinine 02/21/2024 0.82  0.67 - 1.17 mg/dL Final    GFR Estimate 02/21/2024 >90  >60 mL/min/1.73m2 Final    Calcium 02/21/2024 9.8  8.6 - 10.0 mg/dL Final    Chloride 02/21/2024 101  98 - 107 mmol/L Final    Glucose 02/21/2024 109 (H)  70 - 99 mg/dL Final    Alkaline Phosphatase 02/21/2024 64  40 - 150 U/L Final    Reference intervals for this test were updated on 11/14/2023 to more accurately reflect our healthy population. There may be differences in the flagging of prior results with similar values performed with this method. Interpretation of those prior results can be made in the context of the updated reference intervals.    AST 02/21/2024 17  0 - 45 U/L Final    Reference intervals for this test were updated on 6/12/2023 to more accurately reflect our healthy population. There may be differences in the flagging of prior results with similar values performed with this method. Interpretation of those prior results can be made in the context of the updated reference intervals.    ALT 02/21/2024 13  0 - 70 U/L Final    Reference intervals for this test were updated on 6/12/2023 to more accurately reflect our healthy population. There may be differences in the flagging of prior results with similar values performed with this method. Interpretation of those prior results can be made in the context of the updated reference intervals.      Protein Total 02/21/2024 7.4  6.4 - 8.3 g/dL Final    Albumin 02/21/2024 4.8  3.5 - 5.2 g/dL Final    Bilirubin Total 02/21/2024 0.5  <=1.2 mg/dL Final    Cholesterol 02/21/2024 122  <200 mg/dL Final    Triglycerides 02/21/2024 152 (H)  <150 mg/dL Final    Direct Measure HDL 02/21/2024 45  >=40 mg/dL Final    LDL Cholesterol Calculated 02/21/2024 47  <=100 mg/dL Final    Non HDL Cholesterol 02/21/2024 77  <130 mg/dL Final    Patient Fasting > 8hrs? 02/21/2024 Unknown   Final    Creatinine Urine mg/dL 02/21/2024 24.3  mg/dL Final     The reference ranges have not been established in urine creatinine. The results should be integrated into the clinical context for interpretation.    Albumin Urine mg/L 02/21/2024 <12.0  mg/L Final    The reference ranges have not been established in urine albumin. The results should be integrated into the clinical context for interpretation.    Albumin Urine mg/g Cr 02/21/2024    Final    Unable to calculate, urine albumin and/or urine creatinine is outside detectable limits.  Microalbuminuria is defined as an albumin:creatinine ratio of 17 to 299 for males and 25 to 299 for females. A ratio of albumin:creatinine of 300 or higher is indicative of overt proteinuria.  Due to biologic variability, positive results should be confirmed by a second, first-morning random or 24-hour timed urine specimen. If there is discrepancy, a third specimen is recommended. When 2 out of 3 results are in the microalbuminuria range, this is evidence for incipient nephropathy and warrants increased efforts at glucose control, blood pressure control, and institution of therapy with an angiotensin-converting-enzyme (ACE) inhibitor (if the patient can tolerate it).      WBC Count 02/21/2024 7.3  4.0 - 11.0 10e3/uL Final    RBC Count 02/21/2024 4.62  4.40 - 5.90 10e6/uL Final    Hemoglobin 02/21/2024 13.6  13.3 - 17.7 g/dL Final    Hematocrit 02/21/2024 41.3  40.0 - 53.0 % Final    MCV 02/21/2024 89  78 - 100 fL Final    MCH 02/21/2024 29.4  26.5 - 33.0 pg Final    MCHC 02/21/2024 32.9  31.5 - 36.5 g/dL Final    RDW 02/21/2024 12.0  10.0 - 15.0 % Final    Platelet Count 02/21/2024 269  150 - 450 10e3/uL Final    Hemoglobin A1C 02/21/2024 6.5 (H)  4.0 - 6.2 % Final    TSH 02/21/2024 1.15  0.30 - 4.20 uIU/mL Final    Color Urine 02/21/2024 Yellow  Colorless, Straw, Light Yellow, Yellow Final    Appearance Urine 02/21/2024 Clear  Clear Final    Glucose Urine 02/21/2024 Negative  Negative mg/dL Final    Bilirubin Urine 02/21/2024 Negative   Negative Final    Ketones Urine 02/21/2024 Negative  Negative mg/dL Final    Specific Gravity Urine 02/21/2024 1.005  1.000 - 1.030 Final    Blood Urine 02/21/2024 Negative  Negative Final    pH Urine 02/21/2024 6.5  5.0 - 9.0 Final    Protein Albumin Urine 02/21/2024 Negative  Negative mg/dL Final    Urobilinogen Urine 02/21/2024 Normal  Normal, 2.0 mg/dL Final    Nitrite Urine 02/21/2024 Negative  Negative Final    Leukocyte Esterase Urine 02/21/2024 Negative  Negative Final    Prostate Specific Antigen Screen 02/21/2024 0.41  0.00 - 3.50 ng/mL Final      Aspects of Diabetes:   Recent Labs   Lab Test 02/21/24  1556 09/11/23  1544 02/23/23  0718   A1C 6.5* 6.5* 5.7   LDL 47 68 44   HDL 45 64 41   TRIG 152* 127 144   ALT 13 14 13   CR 0.82 1.00 0.94   GFRESTIMATED >90 88 >90   POTASSIUM 4.4 4.3 4.2   TSH 1.15 1.34 1.19   WBC 7.3 7.9 5.6   HGB 13.6 13.4 14.0    252 261   ALBUMIN 4.8 4.6 4.3      Hemoglobin A1c  Goal range is under 8%. Best is 6.5 to 7   Blood Pressure 128/70 Goal to keep less than 140/90   Tobacco  reports that he quit smoking about 30 years ago. His smoking use included cigarettes. He started smoking about 40 years ago. He has a 5 pack-year smoking history. His smokeless tobacco use includes chew. Goal to abstain from tobacco   Aspirin or Plavix Anti-platelet therapy Aspirin or Plavix reduces risk of heart disease and stroke  -- sometimes used with other blood thinners, depending on bleeding risk and risk factors.    ACE/ARB Specific blood pressure meds These medications can reduce risk of kidney disease   Cholesterol Statins (Lipitor, Crestor, vs others) Statins reduce risk of heart disease and stroke   Eye Exam -- Do Yearly -- Annual diabetic eye exam   Healthy weight Wt Readings from Last 4 Encounters:   02/23/24 87.7 kg (193 lb 6.4 oz)   08/27/23 87.5 kg (193 lb)   07/14/23 87.6 kg (193 lb 3.2 oz)   02/23/23 91.3 kg (201 lb 3.2 oz)      Body mass index is 28.77 kg/m .  Goal BMI under 30,  best is under 25.      -- Trying to exercise daily (goal at least 20 min/day) with moderate aerobic activity   -- Eat healthy (resources from ADA at http://www.diabetes.org/)   -- Taking good care of my feet. Consider seeing the Podiatrist   -- Check blood sugars as directed, record in log book and bring to every appointment    Insurance companies are grading you and I on your blood sugar control -- Goal is to get your A1c down to 7.9% or lower and NO Smoking!  -- Medicare and most insurance companies, will only cover Hemoglobin A1c labs to be rechecked every 91+ days.      Return for Diabetes labs and clinic follow-up appointment every 3 to 4 months.    Schedule lab only appointment --- A few days AFTER: 5/23/24   Schedule clinic appointment with Dr. Celeste -- Same day as labs, or 1-2 days later.

## 2024-02-23 NOTE — PROGRESS NOTES
Assessment & Plan   Problem List Items Addressed This Visit          Nervous and Auditory    Type 2 diabetes mellitus with diabetic polyneuropathy, without long-term current use of insulin (H) - Primary    Relevant Medications    metFORMIN (GLUCOPHAGE) 1000 MG tablet    Semaglutide (RYBELSUS) 7 MG tablet    Bilateral foot pain    Relevant Orders    Orthopedic  Referral       Respiratory    REKHA on CPAP - uses daily, finds helpful and beneficial       Digestive    Vitamin B12 deficiency    Relevant Medications    vitamin B-12 (CYANOCOBALAMIN) 2500 MCG sublingual tablet       Endocrine    Mixed hyperlipidemia    Relevant Medications    simvastatin (ZOCOR) 20 MG tablet       Circulatory    Benign essential hypertension    Relevant Medications    lisinopril (ZESTRIL) 20 MG tablet    metoprolol succinate ER (TOPROL XL) 50 MG 24 hr tablet    spironolactone (ALDACTONE) 25 MG tablet       Musculoskeletal and Integumentary    Pre-ulcerative calluses    Relevant Orders    Orthopedic  Referral    High foot arch    Relevant Orders    Orthopedic  Referral       Behavioral    Acute reaction to situational stress    Relevant Medications    escitalopram (LEXAPRO) 10 MG tablet     Other Visit Diagnoses       Annual physical exam        Vaccine counseling               Patient presents for annual physical examination as well as follow-up multiple issues.    Vitamin B12 deficiency.  Ongoing.  Continues with oral replacement.  Prescription sent to pharmacy.  Recommend to continue with B12 supplements while taking metformin.    Patient has acquired foot deformity with high foot arches bilaterally, preulcerative calluses with bilateral foot pain and painful neuropathy.  Podiatry referral placed.    Acute reaction to situational stress, start trial of Lexapro 10 mg daily.    HYPERTENSION - Ongoing. Blood pressure is currently well controlled.  Medication side effects: None. Denies syncope or presyncope.  Continue  "current medications.   Medication list reviewed/updated. Refills completed as needed.      MIXED HYPERLIPIDEMIA.  Ongoing. LDL is at goal: Yes. Triglycerides are at goal: No.  Hopefully lifestyle modifications will improve cholesterol levels, otherwise will consider additional medication dose adjustments or medication changes.  Medication side effects reported: None.   Continue current medications for now. Medication list reviewed/updated. Refills completed as needed.  Recent Labs   Lab Test 02/21/24  1556 09/11/23  1544   CHOL 122 157   HDL 45 64   LDL 47 68   TRIG 152* 127        Sleep Apnea, chronic, ongoing.  Uses CPAP nightly.  Finds helpful and beneficial.  Encouraged continued use.     Vaccine counseling completed.  Encourage routine / annual vaccinations.     Type 2 Diabetes Mellitus, with neuropathy, Reports ongoing/previous: numbness and burning.  Blood sugar control has been good with minimal hyperglycemia. Doing well with diet, oral agents, and exercise.  Medication list reviewed/updated. Refills completed as needed.    Complicating factors include but are not limited to: hypertension, hyperlipidemia, and neuropathy.     Recent Labs   Lab Test 02/21/24  1556 09/11/23  1544 02/23/23  0718   A1C 6.5* 6.5* 5.7   LDL 47 68 44   HDL 45 64 41   TRIG 152* 127 144   ALT 13 14 13   CR 0.82 1.00 0.94   GFRESTIMATED >90 88 >90   POTASSIUM 4.4 4.3 4.2   TSH 1.15 1.34 1.19   WBC 7.3 7.9 5.6   HGB 13.6 13.4 14.0    252 261   ALBUMIN 4.8 4.6 4.3     Hemoglobin A1c is well-controlled.  LDL and HDL are at goal.  Glycerides are high and not at goal.  LT normal.  Creatinine is at baseline.  Potassium normal.  TSH normal.  CBC normal.  Albumin normal.           BMI  Estimated body mass index is 28.77 kg/m  as calculated from the following:    Height as of this encounter: 1.746 m (5' 8.75\").    Weight as of this encounter: 87.7 kg (193 lb 6.4 oz).       Counseling  Appropriate preventive services were discussed with " this patient, including applicable screening as appropriate for fall prevention, nutrition, physical activity, Tobacco-use cessation, weight loss and cognition.  Checklist reviewing preventive services available has been given to the patient.  Reviewed patient's diet, addressing concerns and/or questions.   The patient was instructed to see the dentist every 6 months.         Return in about 3 months (around 5/23/2024) for - Labs every 91+ days, with DM Follow-up, Same Day or 1-2 days later with Dr. Celeste.      Review of Systems   Constitutional:  Negative for chills and fever.        Weight down with Rybelus.   Wt Readings from Last 5 Encounters:  02/23/23 : 91.3 kg (201 lb 3.2 oz)  09/08/22 : 99 kg (218 lb 3.2 oz)  12/01/21 : 103.3 kg (227 lb 12.8 oz)  09/17/20 : 100.8 kg (222 lb 4 oz)  04/15/19 : 99.8 kg (220 lb)     HENT:  Negative for congestion, ear pain, hearing loss and sore throat.    Eyes:  Negative for pain and visual disturbance.   Respiratory:  Positive for apnea (Uses CPAP regularly, finds helpful and beneficial). Negative for cough, shortness of breath and wheezing.    Cardiovascular:  Negative for chest pain, palpitations and peripheral edema.   Gastrointestinal:  Negative for abdominal pain, constipation, diarrhea, heartburn, hematochezia, nausea and vomiting.   Endocrine: Negative for cold intolerance and heat intolerance.        Not watching his diet that closely, still eating some treats.   Genitourinary:  Negative for dysuria, frequency, genital sores, hematuria, impotence, penile discharge and urgency.        + mild increased urinary frequency, can't hold it as long   Musculoskeletal:  Positive for arthralgias (Bilateral foot pain) and gait problem. Negative for joint swelling and myalgias.        Mid foot and metatarsal foot pain, worse at night. Gabapentin has helped the pain.   Skin:  Negative for rash and wound.   Allergic/Immunologic: Negative for immunocompromised state.   Neurological:   Positive for headaches. Negative for dizziness, weakness, light-headedness and paresthesias.   Hematological:  Does not bruise/bleed easily.   Psychiatric/Behavioral:  Positive for mood changes. Negative for agitation and confusion. The patient is not nervous/anxious.          Preventive Care Visit  [facility]  Boo Celeste MD, Internal Medicine  Feb 23, 2024        Juan Jose Carreno is a 58 year old, presenting for the following:  Physical        2/23/2024    10:56 AM   Additional Questions   Roomed by Jorje Ang LPN   Accompanied by n/a        Health Care Directive  Patient does not have a Health Care Directive or Living Will: Discussed advance care planning with patient; however, patient declined at this time.    HPI              2/21/2024   General Health   How would you rate your overall physical health? Good   Feel stress (tense, anxious, or unable to sleep) Not at all         2/21/2024   Nutrition   Three or more servings of calcium each day? Yes   Diet: Regular (no restrictions)   How many servings of fruit and vegetables per day? (!) 0-1   How many sweetened beverages each day? 0-1         2/21/2024   Exercise   Days per week of moderate/strenous exercise 5 days   Average minutes spent exercising at this level 10 min         2/21/2024   Social Factors   Frequency of gathering with friends or relatives Twice a week   Worry food won't last until get money to buy more No   Food not last or not have enough money for food? No   Do you have housing?  Yes   Are you worried about losing your housing? No   Lack of transportation? No   Unable to get utilities (heat,electricity)? No         2/21/2024   Fall Risk   Fallen 2 or more times in the past year? No    No   Trouble with walking or balance? No    No          2/21/2024   Dental   Dentist two times every year? (!) NO         2/15/2024   TB Screening   Were you born outside of [place]?  No       Today's PHQ-9 Score:       2/21/2024     7:16 AM  "  PHQ-9 SCORE   PHQ-9 Total Score MyChart 0   PHQ-9 Total Score 0         2024   Substance Use   Alcohol more than 3/day or more than 7/wk No   Do you use any other substances recreationally? No     Social History     Tobacco Use    Smoking status: Former     Packs/day: 0.50     Years: 10.00     Additional pack years: 0.00     Total pack years: 5.00     Types: Cigarettes     Start date:      Quit date: 1994     Years since quittin.1    Smokeless tobacco: Current     Types: Chew    Tobacco comments:      tin lasts a week, 30 years   Vaping Use    Vaping Use: Never used   Substance Use Topics    Alcohol use: Yes     Comment: occasional - 6 to a 8 week    Drug use: No           2024   STI Screening   New sexual partner(s) since last STI/HIV test? No   Last PSA:   Prostate Specific Antigen Screen   Date Value Ref Range Status   2024 0.41 0.00 - 3.50 ng/mL Final     PSA Tumor Marker   Date Value Ref Range Status   2023 0.34 0.00 - 3.50 ng/mL Final     ASCVD Risk   The ASCVD Risk score (Desiree THOMPSON, et al., 2019) failed to calculate for the following reasons:    The valid total cholesterol range is 130 to 320 mg/dL           Reviewed and updated as needed this visit by Provider   Tobacco  Allergies  Meds  Problems  Med Hx  Surg Hx  Fam Hx                     Objective    Exam  /70 (BP Location: Right arm, Patient Position: Sitting, Cuff Size: Adult Regular)   Pulse 73   Temp 98  F (36.7  C) (Temporal)   Resp 16   Ht 1.746 m (5' 8.75\")   Wt 87.7 kg (193 lb 6.4 oz)   SpO2 98%   BMI 28.77 kg/m     Estimated body mass index is 28.77 kg/m  as calculated from the following:    Height as of this encounter: 1.746 m (5' 8.75\").    Weight as of this encounter: 87.7 kg (193 lb 6.4 oz).    Physical Exam  Constitutional:       General: He is not in acute distress.     Appearance: He is well-developed. He is not diaphoretic.   HENT:      Head: Normocephalic and atraumatic. "   Eyes:      General: No scleral icterus.     Conjunctiva/sclera: Conjunctivae normal.   Neck:      Vascular: No carotid bruit.   Cardiovascular:      Rate and Rhythm: Normal rate and regular rhythm.      Pulses: Normal pulses.   Pulmonary:      Effort: Pulmonary effort is normal.      Breath sounds: Normal breath sounds.   Abdominal:      Palpations: Abdomen is soft.      Tenderness: There is no abdominal tenderness.   Musculoskeletal:         General: No deformity.      Cervical back: Neck supple.      Right lower leg: No edema.      Left lower leg: No edema.   Lymphadenopathy:      Cervical: No cervical adenopathy.   Skin:     General: Skin is warm and dry.      Coloration: Skin is not jaundiced.      Findings: No rash.      Comments: + High foot arches   Neurological:      Mental Status: He is alert. Mental status is at baseline.   Psychiatric:         Mood and Affect: Mood and affect normal.         Speech: Speech normal.         Behavior: Behavior normal.             Signed Electronically by: Boo Celeste MD    Answers submitted by the patient for this visit:  Patient Health Questionnaire (Submitted on 2/21/2024)  If you checked off any problems, how difficult have these problems made it for you to do your work, take care of things at home, or get along with other people?: Not difficult at all  PHQ9 TOTAL SCORE: 0  DEA-7 (Submitted on 2/21/2024)  DEA 7 TOTAL SCORE: 0

## 2024-02-28 ENCOUNTER — TRANSFERRED RECORDS (OUTPATIENT)
Dept: HEALTH INFORMATION MANAGEMENT | Facility: OTHER | Age: 59
End: 2024-02-28
Payer: COMMERCIAL

## 2024-03-06 ENCOUNTER — E-VISIT (OUTPATIENT)
Dept: INTERNAL MEDICINE | Facility: OTHER | Age: 59
End: 2024-03-06
Payer: COMMERCIAL

## 2024-03-06 DIAGNOSIS — K02.9 PAIN DUE TO DENTAL CARIES: ICD-10-CM

## 2024-03-06 DIAGNOSIS — Z98.811 DENTAL FILLING STATUS: Primary | ICD-10-CM

## 2024-03-06 PROCEDURE — 99421 OL DIG E/M SVC 5-10 MIN: CPT | Performed by: INTERNAL MEDICINE

## 2024-03-06 RX ORDER — CEPHALEXIN 500 MG/1
1000 CAPSULE ORAL 2 TIMES DAILY
Qty: 28 CAPSULE | Refills: 0 | Status: SHIPPED | OUTPATIENT
Start: 2024-03-06 | End: 2024-03-13

## 2024-03-06 NOTE — PATIENT INSTRUCTIONS
Get some Dentek or Dentemp - temporary dental filling material from [employer] or [employer]  --this may also be available at [facility].  -Follow package instructions.      If you are worried about infection, start cephalexin/Keflex.  Prescription sent to [facility].      Electronically signed by:  Boo Celeste MD  on March 6, 2024 3:42 PM

## 2024-03-18 ENCOUNTER — TRANSFERRED RECORDS (OUTPATIENT)
Dept: HEALTH INFORMATION MANAGEMENT | Facility: OTHER | Age: 59
End: 2024-03-18
Payer: COMMERCIAL

## 2024-04-15 ENCOUNTER — TRANSFERRED RECORDS (OUTPATIENT)
Dept: HEALTH INFORMATION MANAGEMENT | Facility: OTHER | Age: 59
End: 2024-04-15

## 2024-04-30 ENCOUNTER — HOSPITAL ENCOUNTER (EMERGENCY)
Facility: OTHER | Age: 59
Discharge: HOME | End: 2024-04-30
Attending: STUDENT IN AN ORGANIZED HEALTH CARE EDUCATION/TRAINING PROGRAM | Admitting: STUDENT IN AN ORGANIZED HEALTH CARE EDUCATION/TRAINING PROGRAM
Payer: COMMERCIAL

## 2024-04-30 VITALS
TEMPERATURE: 96 F | HEART RATE: 58 BPM | RESPIRATION RATE: 16 BRPM | SYSTOLIC BLOOD PRESSURE: 155 MMHG | OXYGEN SATURATION: 97 % | DIASTOLIC BLOOD PRESSURE: 94 MMHG | WEIGHT: 193 LBS | HEIGHT: 68 IN | BODY MASS INDEX: 29.25 KG/M2

## 2024-04-30 DIAGNOSIS — T78.40XA ALLERGIC REACTION, INITIAL ENCOUNTER: ICD-10-CM

## 2024-04-30 PROCEDURE — 99284 EMERGENCY DEPT VISIT MOD MDM: CPT

## 2024-04-30 PROCEDURE — 96372 THER/PROPH/DIAG INJ SC/IM: CPT | Performed by: STUDENT IN AN ORGANIZED HEALTH CARE EDUCATION/TRAINING PROGRAM

## 2024-04-30 PROCEDURE — 250N000013 HC RX MED GY IP 250 OP 250 PS 637: Performed by: STUDENT IN AN ORGANIZED HEALTH CARE EDUCATION/TRAINING PROGRAM

## 2024-04-30 PROCEDURE — 99283 EMERGENCY DEPT VISIT LOW MDM: CPT | Performed by: STUDENT IN AN ORGANIZED HEALTH CARE EDUCATION/TRAINING PROGRAM

## 2024-04-30 PROCEDURE — 250N000011 HC RX IP 250 OP 636: Performed by: STUDENT IN AN ORGANIZED HEALTH CARE EDUCATION/TRAINING PROGRAM

## 2024-04-30 RX ORDER — FAMOTIDINE 20 MG/1
20 TABLET, FILM COATED ORAL ONCE
Status: COMPLETED | OUTPATIENT
Start: 2024-04-30 | End: 2024-04-30

## 2024-04-30 RX ORDER — DIPHENHYDRAMINE HCL 25 MG
50 CAPSULE ORAL ONCE
Status: COMPLETED | OUTPATIENT
Start: 2024-04-30 | End: 2024-04-30

## 2024-04-30 RX ORDER — DEXAMETHASONE SODIUM PHOSPHATE 10 MG/ML
10 INJECTION, SOLUTION INTRAMUSCULAR; INTRAVENOUS ONCE
Status: COMPLETED | OUTPATIENT
Start: 2024-04-30 | End: 2024-04-30

## 2024-04-30 RX ORDER — LIDOCAINE 40 MG/G
CREAM TOPICAL
Status: DISCONTINUED | OUTPATIENT
Start: 2024-04-30 | End: 2024-04-30 | Stop reason: HOSPADM

## 2024-04-30 RX ADMIN — FAMOTIDINE 20 MG: 20 TABLET, FILM COATED ORAL at 09:04

## 2024-04-30 RX ADMIN — DEXAMETHASONE SODIUM PHOSPHATE 10 MG: 10 INJECTION, SOLUTION INTRAMUSCULAR; INTRAVENOUS at 09:04

## 2024-04-30 RX ADMIN — DIPHENHYDRAMINE HYDROCHLORIDE 50 MG: 25 CAPSULE ORAL at 08:08

## 2024-04-30 ASSESSMENT — COLUMBIA-SUICIDE SEVERITY RATING SCALE - C-SSRS
2. HAVE YOU ACTUALLY HAD ANY THOUGHTS OF KILLING YOURSELF IN THE PAST MONTH?: NO
6. HAVE YOU EVER DONE ANYTHING, STARTED TO DO ANYTHING, OR PREPARED TO DO ANYTHING TO END YOUR LIFE?: NO
1. IN THE PAST MONTH, HAVE YOU WISHED YOU WERE DEAD OR WISHED YOU COULD GO TO SLEEP AND NOT WAKE UP?: NO

## 2024-04-30 ASSESSMENT — ACTIVITIES OF DAILY LIVING (ADL)
ADLS_ACUITY_SCORE: 33
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
## 2024-04-30 NOTE — DISCHARGE INSTRUCTIONS
Your symptoms seem most likely an allergic reaction to environmental exposure.    Use nondrowsy daytime allergy medication such as Zyrtec and can try Benadryl at bedtime.  Recommend using Visine to help with the irritation of the eyes.    Follow-up with ophthalmology/eye doctor if the mild swelling in your eyes is not fully resolved in the next 2 days.    Please review attached instructions including reasons to return to the emergency department, including change in vision.
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
## 2024-05-30 ENCOUNTER — MEDICAL CORRESPONDENCE (OUTPATIENT)
Dept: HEALTH INFORMATION MANAGEMENT | Facility: OTHER | Age: 59
End: 2024-05-30
Payer: COMMERCIAL

## 2024-07-08 DIAGNOSIS — I10 BENIGN ESSENTIAL HYPERTENSION: ICD-10-CM

## 2024-07-11 NOTE — TELEPHONE ENCOUNTER
[facility] the following:      Requested Prescriptions   Pending Prescriptions Disp Refills    spironolactone (ALDACTONE) 25 MG tablet [Pharmacy Med Name: SPIRONOLACTONE 25 MG TABLET] 90 tablet 1     Sig: TAKE 1 TABLET BY MOUTH EVERY DAY       Diuretics (Including Combos) Protocol Failed - 7/11/2024  1:26 PM   Last Prescription Date:   2/23/24  Last Fill Qty/Refills:         90, R-1    Last Office Visit:              2/23/24   Future Office visit:             Next 5 appointments (look out 90 days)      Aug 16, 2024 11:20 AM  (Arrive by 11:05 AM)  SHORT with Boo Celeste MD  [facility] ([facility] ) [address]  [place] [id number] [phone]           Routing to covering provider for refill consideration, as PCP/provider is out of clinic >48 hours or Pt is completely out of medication and provider is out of the clinic today.  Maria Dolores Lopez RN on 7/11/2024 at 1:27 PM

## 2024-07-12 RX ORDER — SPIRONOLACTONE 25 MG/1
25 TABLET ORAL DAILY
Qty: 90 TABLET | Refills: 4 | Status: SHIPPED | OUTPATIENT
Start: 2024-07-12 | End: 2024-07-25

## 2024-07-25 DIAGNOSIS — F43.0 ACUTE REACTION TO SITUATIONAL STRESS: ICD-10-CM

## 2024-07-25 DIAGNOSIS — I10 BENIGN ESSENTIAL HYPERTENSION: ICD-10-CM

## 2024-07-25 DIAGNOSIS — E11.42 TYPE 2 DIABETES MELLITUS WITH DIABETIC POLYNEUROPATHY, WITHOUT LONG-TERM CURRENT USE OF INSULIN (H): ICD-10-CM

## 2024-07-25 DIAGNOSIS — E78.2 MIXED HYPERLIPIDEMIA: ICD-10-CM

## 2024-07-25 RX ORDER — SPIRONOLACTONE 25 MG/1
25 TABLET ORAL DAILY
Qty: 90 TABLET | Refills: 0 | Status: SHIPPED | OUTPATIENT
Start: 2024-07-25

## 2024-07-25 RX ORDER — ESCITALOPRAM OXALATE 10 MG/1
10 TABLET ORAL DAILY
Qty: 90 TABLET | Refills: 0 | Status: SHIPPED | OUTPATIENT
Start: 2024-07-25

## 2024-07-25 RX ORDER — SIMVASTATIN 20 MG
20 TABLET ORAL AT BEDTIME
Qty: 90 TABLET | Refills: 0 | Status: SHIPPED | OUTPATIENT
Start: 2024-07-25

## 2024-07-25 RX ORDER — LISINOPRIL 20 MG/1
20 TABLET ORAL DAILY
Qty: 90 TABLET | Refills: 0 | Status: SHIPPED | OUTPATIENT
Start: 2024-07-25

## 2024-07-25 RX ORDER — METOPROLOL SUCCINATE 50 MG/1
50 TABLET, EXTENDED RELEASE ORAL DAILY
Qty: 90 TABLET | Refills: 0 | Status: SHIPPED | OUTPATIENT
Start: 2024-07-25

## 2024-07-25 RX ORDER — ORAL SEMAGLUTIDE 7 MG/1
7 TABLET ORAL DAILY
Qty: 90 TABLET | Refills: 0 | Status: SHIPPED | OUTPATIENT
Start: 2024-07-25

## 2024-07-25 NOTE — TELEPHONE ENCOUNTER
Boo Celeste  Patient states he is moving next Tuesday and he does not have a new PCP set up yet. He will not be able to make it to his August appointment. He is asking for a 90 day supply of all prescriptions without refills to allow time for him to establish care.   Sandra Martinez RN on 7/25/2024 at 12:44 PM        Last Office Visit:              2/23/24 R[place]a   Future Office visit:           not applicable    Requested Prescriptions   Pending Prescriptions Disp Refills    escitalopram (LEXAPRO) 10 MG tablet 90 tablet 0     Sig: Take 1 tablet (10 mg) by mouth daily - for mood          lisinopril (ZESTRIL) 20 MG tablet 90 tablet 0     Sig: Take 1 tablet (20 mg) by mouth daily       ACE Inhibitors (Including Combos) Protocol Failed - 7/25/2024 12:47 PM        Failed - Blood pressure under 140/90 in past 12 months- Clinicial or Patient Reported     BP Readings from Last 3 Encounters:   04/30/24 (!) 155/94   02/23/24 128/70   08/27/23 (!) 141/98       No data recorded             metFORMIN (GLUCOPHAGE) 1000 MG tablet 180 tablet 0     Sig: Take 1 tablet (1,000 mg) by mouth 2 times daily (with meals)         metoprolol succinate ER (TOPROL XL) 50 MG 24 hr tablet 90 tablet 0     Sig: Take 1 tablet (50 mg) by mouth daily       Beta-Blockers Protocol Failed - 7/25/2024 12:47 PM        Failed - Blood pressure under 140/90 in past 12 months     BP Readings from Last 3 Encounters:   04/30/24 (!) 155/94   02/23/24 128/70   08/27/23 (!) 141/98       No data recorded           Semaglutide (RYBELSUS) 7 MG tablet 90 tablet 0     Sig: Take 1 tablet (7 mg) by mouth daily         simvastatin (ZOCOR) 20 MG tablet 90 tablet 0     Sig: Take 1 tablet (20 mg) by mouth at bedtime          spironolactone (ALDACTONE) 25 MG tablet 90 tablet 0     Sig: Take 1 tablet (25 mg) by mouth daily       Diuretics (Including Combos) Protocol Failed - 7/25/2024 12:47 PM        Failed - Blood pressure under 140/90 in past 12 months     BP Readings  from Last 3 Encounters:   04/30/24 (!) 155/94   02/23/24 128/70   08/27/23 (!) 141/98       No data recorded

## 2024-07-25 NOTE — TELEPHONE ENCOUNTER
"Patient requests a call back to discuss next steps for medication as [place] the [place] this next Tuesday (07[place]30). The patient requests a 90 day supply of \"basically all\" of his medication as to give him enough time to switch to a new PCP.    Reason for call: Medication or medication refill    Have you contacted your pharmacy regarding this refill? NO    If No, direct the patient to contact the Pharmacy and discontinue telephone note using Erroneous Encounter.  If Yes, continue.    Name of medication requested: 90 day supply -\" basically all\" of his medications. Patient did not want to list all of them.    How many days of medication do you have left? About 30 days    [facility] (Patient does not know which one)      Preferred method for responding to this message: Telephone Call    Phone number patient can be reached at: Cell number on file:    Telephone Information:   Mobile [phone]       If we cannot reach you directly, may we leave a detailed response at the number you provided? Yes          Katherine Le on 7/25/2024 at 12:18 PM    "

## 2024-08-07 DIAGNOSIS — E11.42 TYPE 2 DIABETES MELLITUS WITH DIABETIC POLYNEUROPATHY, WITHOUT LONG-TERM CURRENT USE OF INSULIN (H): ICD-10-CM

## 2024-08-09 NOTE — TELEPHONE ENCOUNTER
[facility] following:      Requested Prescriptions   Pending Prescriptions Disp Refills    metFORMIN (GLUCOPHAGE) 1000 MG tablet [Pharmacy Med Name: METFORMIN HCL 1,000 MG TABLET] 180 tablet 1     Sig: TAKE 1 TABLET BY MOUTH TWICE A DAY WITH MEALS       Biguanide Agents Passed - 8/9/2024  3:35 PM     Last Prescription Date:   7/25/24  Last Fill Qty/Refills:         180, R-0    Last Office Visit:              2/23/24   Future Office visit:              Redundant refill request refused: Too soon:  Maria Dolores Lopez RN on 8/9/2024 at 3:36 PM

## 2024-09-21 ENCOUNTER — HEALTH MAINTENANCE LETTER (OUTPATIENT)
Age: 59
End: 2024-09-21

## 2024-10-27 DIAGNOSIS — E78.2 MIXED HYPERLIPIDEMIA: ICD-10-CM

## 2024-10-30 RX ORDER — SIMVASTATIN 20 MG
20 TABLET ORAL AT BEDTIME
Qty: 90 TABLET | Refills: 0 | Status: SHIPPED | OUTPATIENT
Start: 2024-10-30

## 2024-10-30 NOTE — TELEPHONE ENCOUNTER
[facility] sent Rx request for the following:      Requested Prescriptions   Pending Prescriptions Disp Refills    simvastatin (ZOCOR) 20 MG tablet [Pharmacy Med Name: SIMVASTATIN 20 MG TABLET] 90 tablet 0     Sig: TAKE 1 TABLET BY MOUTH AT BEDTIME       Antihyperlipidemic agents Passed - 10/30/2024 10:00 AM        Passed - LDL on file in the past 12 months        Passed - Medication is active on med list        Passed - Recent (12 mo) or future (90 days) visit within the authorizing provider's specialty     The patient must have completed an in-person or virtual visit within the past 12 months or has a future visit scheduled within the next 90 days with the authorizing provider s specialty.  Urgent care and e-visits do not quality as an office visit for this protocol.          Passed - Patient is age 18 years or older             Last Prescription Date:   7/25/24  Last Fill Qty/Refills:         90, R-0    Last Office Visit:              2/23/24   Future Office visit:           None    Unable to complete prescription refill per RN Medication Refill Policy.     Per LOV note:  Return in about 3 months (around 5/23/2024) for - Labs every 91+ days, with DM Follow-up, Same Day or 1-2 days later with Dr. Roula Nino, RN on 10/30/2024 at 10:02 AM

## 2024-12-14 DIAGNOSIS — E11.42 TYPE 2 DIABETES MELLITUS WITH DIABETIC POLYNEUROPATHY, WITHOUT LONG-TERM CURRENT USE OF INSULIN (H): ICD-10-CM

## 2024-12-14 DIAGNOSIS — F43.0 ACUTE REACTION TO SITUATIONAL STRESS: ICD-10-CM

## 2024-12-17 DIAGNOSIS — I10 BENIGN ESSENTIAL HYPERTENSION: ICD-10-CM

## 2024-12-17 RX ORDER — LISINOPRIL 20 MG/1
20 TABLET ORAL DAILY
Qty: 90 TABLET | Refills: 0 | OUTPATIENT
Start: 2024-12-17

## 2024-12-17 RX ORDER — ORAL SEMAGLUTIDE 7 MG/1
7 TABLET ORAL DAILY
Qty: 90 TABLET | Refills: 0 | OUTPATIENT
Start: 2024-12-17

## 2024-12-17 RX ORDER — METOPROLOL SUCCINATE 50 MG/1
50 TABLET, EXTENDED RELEASE ORAL DAILY
Qty: 90 TABLET | Refills: 0 | OUTPATIENT
Start: 2024-12-17

## 2024-12-17 RX ORDER — ESCITALOPRAM OXALATE 10 MG/1
10 TABLET ORAL DAILY
Qty: 90 TABLET | Refills: 0 | OUTPATIENT
Start: 2024-12-17

## 2024-12-17 NOTE — TELEPHONE ENCOUNTER
[id number] [place] sent Rx request for the following:      Requested Prescriptions   Pending Prescriptions Disp Refills    metoprolol succinate ER (TOPROL XL) 50 MG 24 hr tablet 90 tablet 0     Sig: Take 1 tablet3 (50 mg) by mouth daily.   Last Prescription Date:   7/25/24  Last Fill Qty/Refills:         90, R-0      Beta-Blockers Protocol Failed - 12/17/2024  3:56 PM        Failed - Most recent blood pressure under 140/90 in past 12 months     BP Readings from Last 3 Encounters:   04/30/24 (!) 155/94   02/23/24 128/70   08/27/23 (!) 141/98   No data recorded       lisinopril (ZESTRIL) 20 MG tablet 90 tablet 0     Sig: Take 1 tablet (20 mg) by mouth daily.   Last Prescription Date:   7/25/24  Last Fill Qty/Refills:         90, R-0      ACE Inhibitors (Including Combos) Protocol Failed - 12/17/2024  3:56 PM        Failed - Most recent blood pressure under 140/90 in past 12 months- Clinicial or Patient Reported     BP Readings from Last 3 Encounters:   04/30/24 (!) 155/94   02/23/24 128/70   08/27/23 (!) 141/98   No data recorded     Pt established care with Dr. Flakito Vaughn at [facility] on 12/5/24. Pharmacy notified. Antoinette Nixon RN .............. 12/17/2024  4:13 PM

## 2024-12-17 NOTE — TELEPHONE ENCOUNTER
[id number] [place] sent Rx request for the following:      Requested Prescriptions   Pending Prescriptions Disp Refills    RYBELSUS 7 MG tablet [Pharmacy Med Name: RYBELSUS 7 MG TABLET] 90 tablet 0     Sig: TAKE 1 TABLET (7 MG) BY MOUTH DAILY   Last Prescription Date:   7/25/24  Last Fill Qty/Refills:         90, R-0      GLP-1 Agonists Protocol Failed - 12/17/2024 11:42 AM        Failed - HgbA1C in past 3 or 6 months     If HgbA1C is 8 or greater, it needs to be on file within the past 3 months.  If less than 8, must be on file within the past 6 months.     Recent Labs   Lab Test 02/21/24  1556 08/21/18  0831 05/08/17  1627   A1C 6.5*   < >  --    [id number]  --   --  6.1    < > = values in this interval not displayed.           Failed - Recent (6 mo) or future (90 days) visit within the authorizing provider's specialty       escitalopram (LEXAPRO) 10 MG tablet [Pharmacy Med Name: ESCITALOPRAM 10 MG TABLET] 90 tablet 0     Sig: TAKE 1 TABLET (10 MG) BY MOUTH DAILY - FOR MOOD   Last Prescription Date:   7/25/24  Last Fill Qty/Refills:         90, R-0      SSRIs Protocol Failed - 12/17/2024 11:42 AM        Failed - DEA-7 score of less than 5 in past 6 months.     Please review last DEA-7 score.      Pt established care with Dr. Flakito Vaughn at [facility] on 12/5/24. Pharmacy notified. Antoinette Nixon RN .............. 12/17/2024  11:44 AM

## 2025-04-05 ENCOUNTER — HEALTH MAINTENANCE LETTER (OUTPATIENT)
Age: 60
End: 2025-04-05

## (undated) RX ORDER — DIPHENHYDRAMINE HCL 25 MG
CAPSULE ORAL
Status: DISPENSED
Start: 2024-04-30

## (undated) RX ORDER — CYANOCOBALAMIN 1000 UG/ML
INJECTION, SOLUTION INTRAMUSCULAR; SUBCUTANEOUS
Status: DISPENSED
Start: 2023-02-23

## (undated) RX ORDER — LIDOCAINE HYDROCHLORIDE AND EPINEPHRINE 10; 10 MG/ML; UG/ML
INJECTION, SOLUTION INFILTRATION; PERINEURAL
Status: DISPENSED
Start: 2023-08-27

## (undated) RX ORDER — IPRATROPIUM BROMIDE AND ALBUTEROL SULFATE 2.5; .5 MG/3ML; MG/3ML
SOLUTION RESPIRATORY (INHALATION)
Status: DISPENSED
Start: 2019-04-15

## (undated) RX ORDER — GINSENG 100 MG
CAPSULE ORAL
Status: DISPENSED
Start: 2023-08-27

## (undated) RX ORDER — DEXAMETHASONE SODIUM PHOSPHATE 10 MG/ML
INJECTION, SOLUTION INTRAMUSCULAR; INTRAVENOUS
Status: DISPENSED
Start: 2024-04-30

## (undated) RX ORDER — FAMOTIDINE 20 MG/1
TABLET, FILM COATED ORAL
Status: DISPENSED
Start: 2024-04-30